# Patient Record
Sex: MALE | Race: BLACK OR AFRICAN AMERICAN | Employment: OTHER | URBAN - METROPOLITAN AREA
[De-identification: names, ages, dates, MRNs, and addresses within clinical notes are randomized per-mention and may not be internally consistent; named-entity substitution may affect disease eponyms.]

---

## 2017-01-09 ENCOUNTER — OFFICE VISIT (OUTPATIENT)
Dept: ORTHOPEDIC SURGERY | Age: 62
End: 2017-01-09

## 2017-01-09 VITALS
HEIGHT: 69 IN | RESPIRATION RATE: 16 BRPM | BODY MASS INDEX: 36.73 KG/M2 | TEMPERATURE: 97.6 F | SYSTOLIC BLOOD PRESSURE: 134 MMHG | WEIGHT: 248 LBS | DIASTOLIC BLOOD PRESSURE: 72 MMHG | HEART RATE: 71 BPM

## 2017-01-09 DIAGNOSIS — M16.12 PRIMARY OSTEOARTHRITIS OF LEFT HIP: ICD-10-CM

## 2017-01-09 DIAGNOSIS — M25.552 PAIN OF LEFT HIP JOINT: Primary | ICD-10-CM

## 2017-01-09 PROCEDURE — G8484 FLU IMMUNIZE NO ADMIN: HCPCS | Performed by: PHYSICIAN ASSISTANT

## 2017-01-09 PROCEDURE — G8427 DOCREV CUR MEDS BY ELIG CLIN: HCPCS | Performed by: PHYSICIAN ASSISTANT

## 2017-01-09 PROCEDURE — G8419 CALC BMI OUT NRM PARAM NOF/U: HCPCS | Performed by: PHYSICIAN ASSISTANT

## 2017-01-09 PROCEDURE — 3017F COLORECTAL CA SCREEN DOC REV: CPT | Performed by: PHYSICIAN ASSISTANT

## 2017-01-09 PROCEDURE — 1036F TOBACCO NON-USER: CPT | Performed by: PHYSICIAN ASSISTANT

## 2017-01-09 PROCEDURE — 73502 X-RAY EXAM HIP UNI 2-3 VIEWS: CPT | Performed by: PHYSICIAN ASSISTANT

## 2017-01-09 PROCEDURE — 99214 OFFICE O/P EST MOD 30 MIN: CPT | Performed by: PHYSICIAN ASSISTANT

## 2017-03-06 ENCOUNTER — TELEPHONE (OUTPATIENT)
Dept: ORTHOPEDIC SURGERY | Age: 62
End: 2017-03-06

## 2017-03-23 ENCOUNTER — OFFICE VISIT (OUTPATIENT)
Dept: VASCULAR SURGERY | Age: 62
End: 2017-03-23

## 2017-03-23 VITALS
DIASTOLIC BLOOD PRESSURE: 80 MMHG | SYSTOLIC BLOOD PRESSURE: 136 MMHG | WEIGHT: 263 LBS | BODY MASS INDEX: 38.95 KG/M2 | HEIGHT: 69 IN

## 2017-03-23 DIAGNOSIS — I87.2 CHRONIC VENOUS INSUFFICIENCY: Primary | ICD-10-CM

## 2017-03-23 PROCEDURE — 1036F TOBACCO NON-USER: CPT | Performed by: SURGERY

## 2017-03-23 PROCEDURE — 99214 OFFICE O/P EST MOD 30 MIN: CPT | Performed by: SURGERY

## 2017-03-23 PROCEDURE — 3017F COLORECTAL CA SCREEN DOC REV: CPT | Performed by: SURGERY

## 2017-03-23 PROCEDURE — G8484 FLU IMMUNIZE NO ADMIN: HCPCS | Performed by: SURGERY

## 2017-03-23 PROCEDURE — G8427 DOCREV CUR MEDS BY ELIG CLIN: HCPCS | Performed by: SURGERY

## 2017-03-23 PROCEDURE — G8419 CALC BMI OUT NRM PARAM NOF/U: HCPCS | Performed by: SURGERY

## 2017-03-28 ENCOUNTER — HOSPITAL ENCOUNTER (OUTPATIENT)
Dept: PREADMISSION TESTING | Age: 62
Discharge: HOME | End: 2017-03-28
Attending: ORTHOPAEDIC SURGERY | Admitting: ORTHOPAEDIC SURGERY

## 2017-03-29 ENCOUNTER — OFFICE VISIT (OUTPATIENT)
Dept: ORTHOPEDIC SURGERY | Age: 62
End: 2017-03-29

## 2017-03-29 ENCOUNTER — HOSPITAL ENCOUNTER (OUTPATIENT)
Dept: PREADMISSION TESTING | Age: 62
Discharge: HOME | End: 2017-03-29
Attending: ORTHOPAEDIC SURGERY | Admitting: ORTHOPAEDIC SURGERY

## 2017-03-29 VITALS
WEIGHT: 248 LBS | TEMPERATURE: 96.9 F | BODY MASS INDEX: 36.73 KG/M2 | HEART RATE: 63 BPM | DIASTOLIC BLOOD PRESSURE: 84 MMHG | SYSTOLIC BLOOD PRESSURE: 154 MMHG | HEIGHT: 69 IN

## 2017-03-29 DIAGNOSIS — M16.12 ARTHRITIS OF LEFT HIP: Primary | ICD-10-CM

## 2017-03-29 LAB
ABO/RH: NORMAL
ANION GAP SERPL CALCULATED.3IONS-SCNC: 12 MMOL/L (ref 3–16)
ANTIBODY SCREEN: NORMAL
APTT: 27.5 SEC (ref 21–31.8)
BASOPHILS ABSOLUTE: 0 K/UL (ref 0–0.2)
BASOPHILS RELATIVE PERCENT: 0.7 %
BILIRUBIN URINE: NEGATIVE
BLOOD, URINE: NEGATIVE
BUN BLDV-MCNC: 14 MG/DL (ref 7–20)
CALCIUM SERPL-MCNC: 9.6 MG/DL (ref 8.3–10.6)
CHLORIDE BLD-SCNC: 100 MMOL/L (ref 99–110)
CLARITY: CLEAR
CO2: 32 MMOL/L (ref 21–32)
COLOR: YELLOW
CREAT SERPL-MCNC: 0.7 MG/DL (ref 0.8–1.3)
EKG ATRIAL RATE: 62 BPM
EKG DIAGNOSIS: NORMAL
EKG P AXIS: 18 DEGREES
EKG P-R INTERVAL: 212 MS
EKG Q-T INTERVAL: 404 MS
EKG QRS DURATION: 88 MS
EKG QTC CALCULATION (BAZETT): 410 MS
EKG R AXIS: 20 DEGREES
EKG T AXIS: 6 DEGREES
EKG VENTRICULAR RATE: 62 BPM
EOSINOPHILS ABSOLUTE: 0.2 K/UL (ref 0–0.6)
EOSINOPHILS RELATIVE PERCENT: 4 %
GFR AFRICAN AMERICAN: >60
GFR NON-AFRICAN AMERICAN: >60
GLUCOSE BLD-MCNC: 124 MG/DL (ref 70–99)
GLUCOSE URINE: NEGATIVE MG/DL
HCT VFR BLD CALC: 35.6 % (ref 40.5–52.5)
HEMOGLOBIN: 11.7 G/DL (ref 13.5–17.5)
INR BLD: 1.1 (ref 0.85–1.15)
KETONES, URINE: NEGATIVE MG/DL
LEUKOCYTE ESTERASE, URINE: NEGATIVE
LYMPHOCYTES ABSOLUTE: 2 K/UL (ref 1–5.1)
LYMPHOCYTES RELATIVE PERCENT: 47.6 %
MCH RBC QN AUTO: 31.6 PG (ref 26–34)
MCHC RBC AUTO-ENTMCNC: 32.7 G/DL (ref 31–36)
MCV RBC AUTO: 96.6 FL (ref 80–100)
MICROSCOPIC EXAMINATION: NORMAL
MONOCYTES ABSOLUTE: 0.4 K/UL (ref 0–1.3)
MONOCYTES RELATIVE PERCENT: 8.6 %
NEUTROPHILS ABSOLUTE: 1.6 K/UL (ref 1.7–7.7)
NEUTROPHILS RELATIVE PERCENT: 39.1 %
NITRITE, URINE: NEGATIVE
PDW BLD-RTO: 13.3 % (ref 12.4–15.4)
PH UA: 6
PLATELET # BLD: 151 K/UL (ref 135–450)
PMV BLD AUTO: 9 FL (ref 5–10.5)
POTASSIUM SERPL-SCNC: 4.4 MMOL/L (ref 3.5–5.1)
PROTEIN UA: NEGATIVE MG/DL
PROTHROMBIN TIME: 12.4 SEC (ref 9.6–13)
RBC # BLD: 3.69 M/UL (ref 4.2–5.9)
SEDIMENTATION RATE, ERYTHROCYTE: 11 MM/HR (ref 0–20)
SODIUM BLD-SCNC: 144 MMOL/L (ref 136–145)
SPECIFIC GRAVITY UA: 1.02
URINE REFLEX TO CULTURE: NORMAL
URINE TYPE: NORMAL
UROBILINOGEN, URINE: 0.2 E.U./DL
WBC # BLD: 4.1 K/UL (ref 4–11)

## 2017-03-29 PROCEDURE — 3017F COLORECTAL CA SCREEN DOC REV: CPT | Performed by: ORTHOPAEDIC SURGERY

## 2017-03-29 PROCEDURE — 1036F TOBACCO NON-USER: CPT | Performed by: ORTHOPAEDIC SURGERY

## 2017-03-29 PROCEDURE — G8417 CALC BMI ABV UP PARAM F/U: HCPCS | Performed by: ORTHOPAEDIC SURGERY

## 2017-03-29 PROCEDURE — 93010 ELECTROCARDIOGRAM REPORT: CPT | Performed by: INTERNAL MEDICINE

## 2017-03-29 PROCEDURE — G8427 DOCREV CUR MEDS BY ELIG CLIN: HCPCS | Performed by: ORTHOPAEDIC SURGERY

## 2017-03-29 PROCEDURE — 99214 OFFICE O/P EST MOD 30 MIN: CPT | Performed by: ORTHOPAEDIC SURGERY

## 2017-03-29 PROCEDURE — G8484 FLU IMMUNIZE NO ADMIN: HCPCS | Performed by: ORTHOPAEDIC SURGERY

## 2017-03-30 ENCOUNTER — PAT TELEPHONE (OUTPATIENT)
Dept: PREADMISSION TESTING | Age: 62
End: 2017-03-30

## 2017-03-30 VITALS — WEIGHT: 246 LBS | BODY MASS INDEX: 36.43 KG/M2 | HEIGHT: 69 IN

## 2017-03-30 LAB
ESTIMATED AVERAGE GLUCOSE: 96.8 MG/DL
HBA1C MFR BLD: 5 %
MRSA SCREEN RT-PCR: NORMAL

## 2017-03-30 RX ORDER — OXYCODONE HYDROCHLORIDE AND ACETAMINOPHEN 5; 325 MG/1; MG/1
1 TABLET ORAL PRN
Status: ON HOLD | COMMUNITY
Start: 2017-03-15 | End: 2017-04-04 | Stop reason: HOSPADM

## 2017-03-30 RX ORDER — AMITRIPTYLINE HYDROCHLORIDE 75 MG/1
75 TABLET, FILM COATED ORAL NIGHTLY PRN
COMMUNITY
Start: 2017-03-15

## 2017-03-30 RX ORDER — NABUMETONE 750 MG/1
750 TABLET, FILM COATED ORAL 2 TIMES DAILY
Status: ON HOLD | COMMUNITY
Start: 2017-03-15 | End: 2017-04-04 | Stop reason: HOSPADM

## 2017-03-30 RX ORDER — OXYCODONE HYDROCHLORIDE 5 MG/1
10 TABLET ORAL ONCE
Status: CANCELLED | OUTPATIENT
Start: 2017-04-04

## 2017-04-04 PROBLEM — M16.12 ARTHRITIS OF LEFT HIP: Status: ACTIVE | Noted: 2017-04-04

## 2017-04-14 ENCOUNTER — OFFICE VISIT (OUTPATIENT)
Dept: ORTHOPEDIC SURGERY | Age: 62
End: 2017-04-14

## 2017-04-14 VITALS — HEIGHT: 69 IN | BODY MASS INDEX: 36.73 KG/M2 | WEIGHT: 248 LBS

## 2017-04-14 DIAGNOSIS — Z96.642 STATUS POST LEFT HIP REPLACEMENT: Primary | ICD-10-CM

## 2017-04-14 PROCEDURE — 73502 X-RAY EXAM HIP UNI 2-3 VIEWS: CPT | Performed by: PHYSICIAN ASSISTANT

## 2017-04-14 PROCEDURE — 99024 POSTOP FOLLOW-UP VISIT: CPT | Performed by: PHYSICIAN ASSISTANT

## 2017-04-14 RX ORDER — OXYCODONE HYDROCHLORIDE AND ACETAMINOPHEN 5; 325 MG/1; MG/1
1-2 TABLET ORAL EVERY 4 HOURS PRN
Qty: 60 TABLET | Refills: 0 | Status: SHIPPED | OUTPATIENT
Start: 2017-04-14 | End: 2017-05-01 | Stop reason: SDUPTHER

## 2017-04-20 ENCOUNTER — HOSPITAL ENCOUNTER (OUTPATIENT)
Dept: PHYSICAL THERAPY | Age: 62
Discharge: HOME | End: 2017-04-30
Attending: ORTHOPAEDIC SURGERY | Admitting: ORTHOPAEDIC SURGERY

## 2017-04-20 ASSESSMENT — PAIN DESCRIPTION - PAIN TYPE: TYPE: SURGICAL PAIN

## 2017-04-20 ASSESSMENT — PAIN DESCRIPTION - DESCRIPTORS: DESCRIPTORS: BURNING;SHARP;SHOOTING

## 2017-04-20 ASSESSMENT — PAIN DESCRIPTION - ONSET: ONSET: GRADUAL

## 2017-04-20 ASSESSMENT — PAIN DESCRIPTION - FREQUENCY: FREQUENCY: INTERMITTENT

## 2017-04-20 ASSESSMENT — PAIN DESCRIPTION - PROGRESSION: CLINICAL_PROGRESSION: GRADUALLY IMPROVING

## 2017-04-20 ASSESSMENT — PAIN SCALES - GENERAL: PAINLEVEL_OUTOF10: 8

## 2017-04-20 ASSESSMENT — PAIN DESCRIPTION - LOCATION: LOCATION: HIP

## 2017-04-24 ENCOUNTER — HOSPITAL ENCOUNTER (OUTPATIENT)
Dept: PHYSICAL THERAPY | Age: 62
Discharge: HOME | End: 2017-04-25
Admitting: ORTHOPAEDIC SURGERY

## 2017-04-26 ENCOUNTER — HOSPITAL ENCOUNTER (OUTPATIENT)
Dept: PHYSICAL THERAPY | Age: 62
Discharge: HOME | End: 2017-04-27
Admitting: ORTHOPAEDIC SURGERY

## 2017-05-01 ENCOUNTER — OFFICE VISIT (OUTPATIENT)
Dept: ORTHOPEDIC SURGERY | Age: 62
End: 2017-05-01

## 2017-05-01 VITALS — TEMPERATURE: 97.7 F

## 2017-05-01 DIAGNOSIS — Z96.642 STATUS POST TOTAL HIP REPLACEMENT, LEFT: Primary | ICD-10-CM

## 2017-05-01 PROCEDURE — 99024 POSTOP FOLLOW-UP VISIT: CPT | Performed by: ORTHOPAEDIC SURGERY

## 2017-05-01 RX ORDER — OXYCODONE HYDROCHLORIDE AND ACETAMINOPHEN 5; 325 MG/1; MG/1
1 TABLET ORAL EVERY 4 HOURS PRN
Qty: 60 TABLET | Refills: 0 | Status: SHIPPED | OUTPATIENT
Start: 2017-05-01 | End: 2017-05-19 | Stop reason: SDUPTHER

## 2017-05-03 ENCOUNTER — HOSPITAL ENCOUNTER (OUTPATIENT)
Dept: PHYSICAL THERAPY | Age: 62
Discharge: HOME | End: 2017-05-04
Admitting: ORTHOPAEDIC SURGERY

## 2017-05-10 ENCOUNTER — HOSPITAL ENCOUNTER (OUTPATIENT)
Dept: PHYSICAL THERAPY | Age: 62
Discharge: HOME | End: 2017-05-11
Admitting: ORTHOPAEDIC SURGERY

## 2017-05-17 ENCOUNTER — HOSPITAL ENCOUNTER (OUTPATIENT)
Dept: PHYSICAL THERAPY | Age: 62
Discharge: HOME | End: 2017-05-18
Admitting: ORTHOPAEDIC SURGERY

## 2017-05-19 ENCOUNTER — HOSPITAL ENCOUNTER (OUTPATIENT)
Dept: PHYSICAL THERAPY | Age: 62
Discharge: HOME | End: 2017-05-20
Admitting: ORTHOPAEDIC SURGERY

## 2017-05-19 RX ORDER — OXYCODONE HYDROCHLORIDE AND ACETAMINOPHEN 5; 325 MG/1; MG/1
1 TABLET ORAL EVERY 6 HOURS PRN
Qty: 60 TABLET | Refills: 0 | Status: SHIPPED | OUTPATIENT
Start: 2017-05-19 | End: 2017-06-18

## 2017-05-22 ENCOUNTER — TELEPHONE (OUTPATIENT)
Dept: ORTHOPEDIC SURGERY | Age: 62
End: 2017-05-22

## 2017-05-22 ENCOUNTER — HOSPITAL ENCOUNTER (OUTPATIENT)
Dept: PHYSICAL THERAPY | Age: 62
Discharge: HOME | End: 2017-05-23
Admitting: ORTHOPAEDIC SURGERY

## 2017-05-24 ENCOUNTER — HOSPITAL ENCOUNTER (OUTPATIENT)
Dept: PHYSICAL THERAPY | Age: 62
Discharge: HOME | End: 2017-05-25
Admitting: ORTHOPAEDIC SURGERY

## 2017-05-25 ENCOUNTER — OFFICE VISIT (OUTPATIENT)
Dept: ORTHOPEDIC SURGERY | Age: 62
End: 2017-05-25

## 2017-05-25 VITALS — WEIGHT: 248 LBS | RESPIRATION RATE: 16 BRPM | TEMPERATURE: 98.1 F | BODY MASS INDEX: 36.73 KG/M2 | HEIGHT: 69 IN

## 2017-05-25 DIAGNOSIS — Z96.642 H/O TOTAL HIP ARTHROPLASTY, LEFT: Primary | ICD-10-CM

## 2017-05-25 PROCEDURE — 99024 POSTOP FOLLOW-UP VISIT: CPT | Performed by: PHYSICIAN ASSISTANT

## 2017-05-25 RX ORDER — OXYBUTYNIN CHLORIDE 10 MG/1
TABLET, EXTENDED RELEASE ORAL
COMMUNITY
Start: 2017-05-11 | End: 2022-01-18

## 2017-05-25 RX ORDER — NABUMETONE 750 MG/1
750 TABLET, FILM COATED ORAL 2 TIMES DAILY
Status: ON HOLD | COMMUNITY
Start: 2017-05-18 | End: 2019-07-26 | Stop reason: HOSPADM

## 2017-05-25 RX ORDER — GABAPENTIN 800 MG/1
800 TABLET ORAL 3 TIMES DAILY
COMMUNITY
Start: 2017-05-20 | End: 2021-12-23

## 2017-06-02 ENCOUNTER — HOSPITAL ENCOUNTER (OUTPATIENT)
Dept: PHYSICAL THERAPY | Age: 62
Discharge: HOME | End: 2017-06-03
Admitting: ORTHOPAEDIC SURGERY

## 2017-06-06 ENCOUNTER — HOSPITAL ENCOUNTER (OUTPATIENT)
Dept: PHYSICAL THERAPY | Age: 62
Discharge: HOME | End: 2017-06-07
Admitting: ORTHOPAEDIC SURGERY

## 2017-06-08 ENCOUNTER — HOSPITAL ENCOUNTER (OUTPATIENT)
Dept: PHYSICAL THERAPY | Age: 62
Discharge: HOME | End: 2017-06-09
Admitting: ORTHOPAEDIC SURGERY

## 2017-06-13 ENCOUNTER — HOSPITAL ENCOUNTER (OUTPATIENT)
Dept: PHYSICAL THERAPY | Age: 62
Discharge: HOME | End: 2017-06-14
Admitting: ORTHOPAEDIC SURGERY

## 2017-06-15 ENCOUNTER — HOSPITAL ENCOUNTER (OUTPATIENT)
Dept: PHYSICAL THERAPY | Age: 62
Discharge: HOME | End: 2017-06-16
Admitting: ORTHOPAEDIC SURGERY

## 2017-06-20 ENCOUNTER — HOSPITAL ENCOUNTER (OUTPATIENT)
Dept: PHYSICAL THERAPY | Age: 62
Discharge: HOME | End: 2017-06-21
Admitting: ORTHOPAEDIC SURGERY

## 2017-06-29 ENCOUNTER — HOSPITAL ENCOUNTER (OUTPATIENT)
Dept: PHYSICAL THERAPY | Age: 62
Discharge: HOME | End: 2017-06-30
Admitting: ORTHOPAEDIC SURGERY

## 2017-07-03 ENCOUNTER — OFFICE VISIT (OUTPATIENT)
Dept: ORTHOPEDIC SURGERY | Age: 62
End: 2017-07-03

## 2017-07-03 VITALS — TEMPERATURE: 98.7 F | BODY MASS INDEX: 36.29 KG/M2 | HEIGHT: 69 IN | WEIGHT: 245 LBS

## 2017-07-03 DIAGNOSIS — M16.12 PRIMARY OSTEOARTHRITIS OF LEFT HIP: Primary | ICD-10-CM

## 2017-07-03 PROCEDURE — 99024 POSTOP FOLLOW-UP VISIT: CPT | Performed by: ORTHOPAEDIC SURGERY

## 2017-07-03 RX ORDER — METHYLPREDNISOLONE 4 MG/1
TABLET ORAL
Qty: 1 KIT | Refills: 0 | Status: SHIPPED | OUTPATIENT
Start: 2017-07-03 | End: 2017-07-09

## 2017-07-24 ENCOUNTER — OFFICE VISIT (OUTPATIENT)
Dept: ORTHOPEDIC SURGERY | Age: 62
End: 2017-07-24

## 2017-07-24 VITALS
HEART RATE: 73 BPM | HEIGHT: 69 IN | DIASTOLIC BLOOD PRESSURE: 87 MMHG | TEMPERATURE: 97.2 F | SYSTOLIC BLOOD PRESSURE: 149 MMHG | BODY MASS INDEX: 36.29 KG/M2 | WEIGHT: 245 LBS

## 2017-07-24 DIAGNOSIS — Z96.642 STATUS POST TOTAL HIP REPLACEMENT, LEFT: Primary | ICD-10-CM

## 2017-07-24 PROCEDURE — G8427 DOCREV CUR MEDS BY ELIG CLIN: HCPCS | Performed by: PHYSICIAN ASSISTANT

## 2017-07-24 PROCEDURE — 1036F TOBACCO NON-USER: CPT | Performed by: PHYSICIAN ASSISTANT

## 2017-07-24 PROCEDURE — G8417 CALC BMI ABV UP PARAM F/U: HCPCS | Performed by: PHYSICIAN ASSISTANT

## 2017-07-24 PROCEDURE — 99212 OFFICE O/P EST SF 10 MIN: CPT | Performed by: PHYSICIAN ASSISTANT

## 2017-07-24 PROCEDURE — 3017F COLORECTAL CA SCREEN DOC REV: CPT | Performed by: PHYSICIAN ASSISTANT

## 2017-07-25 PROBLEM — Z96.642 STATUS POST TOTAL HIP REPLACEMENT, LEFT: Status: ACTIVE | Noted: 2017-07-25

## 2017-10-30 ENCOUNTER — OFFICE VISIT (OUTPATIENT)
Dept: ORTHOPEDIC SURGERY | Age: 62
End: 2017-10-30

## 2017-10-30 VITALS
WEIGHT: 245 LBS | DIASTOLIC BLOOD PRESSURE: 74 MMHG | HEART RATE: 85 BPM | HEIGHT: 69 IN | BODY MASS INDEX: 36.29 KG/M2 | TEMPERATURE: 97.9 F | SYSTOLIC BLOOD PRESSURE: 134 MMHG

## 2017-10-30 DIAGNOSIS — Z96.642 STATUS POST TOTAL HIP REPLACEMENT, LEFT: Primary | ICD-10-CM

## 2017-10-30 PROCEDURE — 1036F TOBACCO NON-USER: CPT | Performed by: PHYSICIAN ASSISTANT

## 2017-10-30 PROCEDURE — 3017F COLORECTAL CA SCREEN DOC REV: CPT | Performed by: PHYSICIAN ASSISTANT

## 2017-10-30 PROCEDURE — G8427 DOCREV CUR MEDS BY ELIG CLIN: HCPCS | Performed by: PHYSICIAN ASSISTANT

## 2017-10-30 PROCEDURE — G8417 CALC BMI ABV UP PARAM F/U: HCPCS | Performed by: PHYSICIAN ASSISTANT

## 2017-10-30 PROCEDURE — 99213 OFFICE O/P EST LOW 20 MIN: CPT | Performed by: PHYSICIAN ASSISTANT

## 2017-10-30 PROCEDURE — G8484 FLU IMMUNIZE NO ADMIN: HCPCS | Performed by: PHYSICIAN ASSISTANT

## 2017-10-30 NOTE — PROGRESS NOTES
Subjective:      Patient ID: Justus Lantigua is a 57 y.o. male. Chief Complaint   Patient presents with    Hip Pain     Left TALIB 4.4.2017        HPI:  Here for follow up on left hip arthroplasty. The date of procedure- 4/4/2017. Denies fever or chills. He has noticed some increased swelling in the left buttock region over the past several days. No history of injury. Denies any other complaints. Review of Systems:   Negative for fever or chills. Negative for instability. Past Medical History:   Diagnosis Date    Arthritis     Diabetes mellitus ([address]     High blood pressure     Hyperlipidemia     Wears partial dentures        Family History   Problem Relation Age of Onset    Heart Disease Mother     High Blood Pressure Mother     Diabetes Mother     Heart Disease Father     High Blood Pressure Father     Diabetes Father        Past Surgical History:   Procedure Laterality Date   Tonya Tameka SURGERY  10/2007   1979  1980    CARPAL TUNNEL RELEASE Bilateral     HIP ARTHROPLASTY Left 04/04/2017    HIP SURGERY  04/2008    bursa removed    JOINT REPLACEMENT  2009, 2006, 2003, 1997    Left knee replacement    KNEE SURGERY Left     [id number] scopes       Social History     Occupational History    Not on file.      Social History Main Topics    Smoking status: Never Smoker    Smokeless tobacco: Not on file    Alcohol use No    Drug use: No    Sexual activity: Not on file       Current Outpatient Prescriptions   Medication Sig Dispense Refill    gabapentin (NEURONTIN) 800 MG tablet       nabumetone (RELAFEN) 750 MG tablet       oxybutynin (DITROPAN-XL) 10 MG extended release tablet       rivaroxaban (XARELTO) 10 MG TABS tablet Take 1 tablet by mouth daily Begin day after discharge from hospital and continue for 30 total days 30 tablet 0    amitriptyline (ELAVIL) 75 MG tablet Take 75 mg by mouth nightly as needed       augmented betamethasone dipropionate (DIPROLENE-AF) 0.05 % cream Apply thin layer topically to L ankle every third day 45 g 1    gabapentin (NEURONTIN) 600 MG tablet Take 600 mg by mouth 3 times daily.  metformin (GLUCOPHAGE) 500 MG tablet Take 500 mg by mouth 2 times daily (with meals) Takes only as needed      metoprolol (TOPROL-XL) 25 MG XL tablet Take 25 mg by mouth daily       zolpidem (AMBIEN) 5 MG tablet Take 5 mg by mouth nightly as needed       baclofen (LIORESAL) 10 MG tablet Take 10 mg by mouth 3 times daily.  methadone (DOLOPHINE) 5 MG tablet Take 5 mg by mouth 3 times daily  .  PRESTIGE SMART SYSTEM TEST STP strip       Multiple Vitamins-Minerals (CENTRUM SILVER PO) Take  by mouth.  lisinopril-hydrochlorothiazide (PRINZIDE;ZESTORETIC) 20-25 MG per tablet Take 1 Tab by mouth daily.  atorvastatin (LIPITOR) 20 MG tablet Take 20 mg by mouth nightly        No current facility-administered medications for this visit. Objective:   He  is alert, oriented x 3, pleasant, well nourished, developed and in no acute distress. /74   Pulse 85   Temp 97.9 °F (36.6 °C) (Temporal)   Ht 5' 9\" (1.753 m)   Wt 245 lb (111.1 kg)   BMI 36.18 kg/m²        Examination of the left hip shows: The incision is healed. There is no erythema. There is no pain with internal and external rotation. There is mild pain with flexion and extension. There is mild pain with active SLR. Leg lengths: equal  There is no pain with weight bearing. He does have some fullness in the gluteus region on the left compared to the right. There is no fluctuance or increased warmth. Examination of the lower extremities are intact with sensation to light touch. Motor testing  5/5 in all major motor groups of the lower extremities. Gait is normal heel to toe. Gait is not antalgic. Negative Waggoner's Sign. SLR negative. Examination of the lower extremities shows intact perfusion to all extremities.   No

## 2018-05-16 ENCOUNTER — HOSPITAL ENCOUNTER (OUTPATIENT)
Dept: OTHER | Age: 63
Discharge: HOME | End: 2018-05-31
Attending: ORTHOPAEDIC SURGERY | Admitting: ORTHOPAEDIC SURGERY

## 2018-05-16 ASSESSMENT — PAIN DESCRIPTION - ORIENTATION: ORIENTATION: RIGHT

## 2018-05-16 ASSESSMENT — PAIN DESCRIPTION - ONSET: ONSET: ON-GOING

## 2018-05-16 ASSESSMENT — PAIN DESCRIPTION - PAIN TYPE: TYPE: SURGICAL PAIN

## 2018-05-16 ASSESSMENT — PAIN DESCRIPTION - PROGRESSION: CLINICAL_PROGRESSION: NOT CHANGED

## 2018-05-16 ASSESSMENT — PAIN SCALES - GENERAL: PAINLEVEL_OUTOF10: 8

## 2018-05-16 ASSESSMENT — PAIN DESCRIPTION - FREQUENCY: FREQUENCY: CONTINUOUS

## 2018-05-16 ASSESSMENT — PAIN DESCRIPTION - LOCATION: LOCATION: KNEE

## 2018-05-16 ASSESSMENT — ACTIVITIES OF DAILY LIVING (ADL): EFFECT OF PAIN ON DAILY ACTIVITIES: LIMITS WB ACTIVITIES

## 2018-05-18 ENCOUNTER — HOSPITAL ENCOUNTER (OUTPATIENT)
Dept: PHYSICAL THERAPY | Age: 63
Discharge: HOME | End: 2018-05-19
Admitting: ORTHOPAEDIC SURGERY

## 2018-05-18 NOTE — FLOWSHEET NOTE
Physical Therapy Daily Treatment Note  Date:  2018    Patient Name:  Carmelo Keating    :  1955  MRN: [id number]  Restrictions/Precautions:  TALIB's, Left TKA  Pertinent Medical History:  Medical/Treatment Diagnosis Information:  · Diagnosis: Right knee oa  · Treatment Diagnosis: decreased abilty to ambulate and function  Insurance/Certification information:  PT Insurance Information: medicare  Physician Information:  Referring Practitioner: Dr. Rocky Mcbride of care signed (Y/N):  routed  Visit# / total visits:   Pain level: 4-8/10     G-Code (if applicable):   CM,  18    Date / Visit # G-Code Applied:  /  PT G-Codes  Functional Assessment Tool Used: lefs  Score: 4  Functional Limitation: Mobility: Walking and moving around  Mobility: Walking and Moving Around Current Status (): At least 80 percent but less than 100 percent impaired, limited or restricted  Mobility: Walking and Moving Around Goal Status (): At least 60 percent but less than 80 percent impaired, limited or restricted    Progress Note: []  Yes  []  No  Next due by: Visit #10      History of Injury: Pt is a 56 y/o male with a hx of right knee pain for a few years with a resultant right TKA on 18. He had a nerve block with his surgery  and the pain was ok until today. He did not have PT. He now c/o constant  pain in his right knee which is severe with wb activties, at night,  He wakes from pain and can't get comfortable. He is walking with  a rolling walker at this time. He hopes to return to all activities and normal walking    Subjective:   Pt states, \" Doing ok so far \"  18 Patient reports knee is stiff and sore today.     Objective:  Initial- flex- 60, ext- -10,  Strength- 4-/5    Exercises:  Exercise/Equipment Resistance/Repetitions Other comments   Nu step 6 min    Leg press 60 # x 30     Heel slides 30 x     Hams stretch     saq/laq 0 # x 30     wabble     Step ups     Side step     Standing

## 2018-05-22 ENCOUNTER — HOSPITAL ENCOUNTER (OUTPATIENT)
Dept: PHYSICAL THERAPY | Age: 63
Discharge: HOME | End: 2018-05-23
Admitting: ORTHOPAEDIC SURGERY

## 2018-05-22 NOTE — FLOWSHEET NOTE
Hams stretch     saq/laq 0 # x 30     wabble     Step ups     Side step     Standing tke     incline 3 x 30 sec          HEP     Heel slides X 20    Qs,laq X 10     slr X 10    Seated hams/gastroc stretch 30 x 3    Seated flex stretch 30 x 3    Weight shift X 2 min           Other Therapeutic Activities:      Home Exercise Program:  Patient demonstrated proper technique, good tolerance,  and was given written instructions for the above exercises      Manual Treatments:  Prom flex and ext x 10 min    Modalities: cp x 10     Timed Code Treatment Minutes: 40      Total Treatment Minutes:  50    Treatment/Activity Tolerance:  [x] Patient tolerated treatment well [] Patient limited by fatigue  [] Patient limited by pain  [] Patient limited by other medical complications  [] Other:     Prognosis: [x] Good [] Fair  [] Poor    Patient Requires Follow-up: [x] Yes  [] No    Plan:   [] Continue per plan of care [] Alter current plan (see comments)  medicare  [x] Plan of care initiated [] Hold pending MD visit [] Discharge  Plan for Next Session: right le rom , strength, gait, hep, functional activities, mfr, rom, mods for pain, advanced gait activities, proprio, 5/22/18  PT had a little redness in lower leg, it was not warm to otuch.   Told him to keep an eye on it and call the md if it changes     Electronically signed by:  Courtney Maher, PT

## 2018-05-30 ENCOUNTER — HOSPITAL ENCOUNTER (OUTPATIENT)
Dept: PHYSICAL THERAPY | Age: 63
Discharge: HOME | End: 2018-06-30
Admitting: ORTHOPAEDIC SURGERY

## 2018-05-30 NOTE — FLOWSHEET NOTE
Physical Therapy Daily Treatment Note  Date:  2018    Patient Name:  Estrellita Phalen    :  1955  MRN: [id number]  Restrictions/Precautions:  TALIB's, Left TKA  Pertinent Medical History:  Medical/Treatment Diagnosis Information:  · Diagnosis: Right knee oa  · Treatment Diagnosis: decreased abilty to ambulate and function  Insurance/Certification information:  PT Insurance Information: medicare  Physician Information:  Referring Practitioner: Dr. Faby Brandon of care signed (Y/N):  routed  Visit# / total visits:   Pain level: 4-8/10     G-Code (if applicable):   CM,  18    Date / Visit # G-Code Applied:  /  PT G-Codes  Functional Assessment Tool Used: lefs  Score: 4  Functional Limitation: Mobility: Walking and moving around  Mobility: Walking and Moving Around Current Status (): At least 80 percent but less than 100 percent impaired, limited or restricted  Mobility: Walking and Moving Around Goal Status (): At least 60 percent but less than 80 percent impaired, limited or restricted    Progress Note: []  Yes  []  No  Next due by: Visit #10      History of Injury: Pt is a 58 y/o male with a hx of right knee pain for a few years with a resultant right TKA on 18. He had a nerve block with his surgery  and the pain was ok until today. He did not have PT. He now c/o constant  pain in his right knee which is severe with wb activties, at night,  He wakes from pain and can't get comfortable. He is walking with  a rolling walker at this time. He hopes to return to all activities and normal walking    Subjective:   Pt states, \" Doing ok so far \"  18 Patient reports knee is stiff and sore today. 18  Pt states, \" Not sure what I did, but it is way more sore and swollen \"  18 Patient reports knee has been swelling a lot. States he saw MD due to his ankle having a bruise.     Objective:  Initial- flex- 60, ext- -10,  Strength- 4-/5    Exercises:  Exercise/Equipment Resistance/Repetitions Other comments   Nu step 6 min    Leg press 60 # x 30     Heel slides 30 x     Hams stretch 3 x 30 sec     saq/laq 0 # x 30     wabble     Step ups     Side step     Standing tke     incline 3 x 30 sec          HEP     Heel slides X 20    Qs,laq X 10     slr X 10    Seated hams/gastroc stretch 30 x 3    Seated flex stretch 30 x 3    Weight shift X 2 min           Other Therapeutic Activities:      Home Exercise Program:  Patient demonstrated proper technique, good tolerance,  and was given written instructions for the above exercises      Manual Treatments:  Prom flex and ext x 10 min    Modalities:IFC  with cp x 15    Timed Code Treatment Minutes: 40      Total Treatment Minutes:  50    Treatment/Activity Tolerance:  [x] Patient tolerated treatment well [] Patient limited by fatigue  [] Patient limited by pain  [] Patient limited by other medical complications  [] Other:     Prognosis: [x] Good [] Fair  [] Poor    Patient Requires Follow-up: [x] Yes  [] No    Plan:   [] Continue per plan of care [] Alter current plan (see comments)  medicare  [x] Plan of care initiated [] Hold pending MD visit [] Discharge  Plan for Next Session: right le rom , strength, gait, hep, functional activities, mfr, rom, mods for pain, advanced gait activities, proprio, 5/22/18  PT had a little redness in lower leg, it was not warm to otuch.   Told him to keep an eye on it and call the md if it changes     Electronically signed by:  Bea Doyle PTA

## 2018-05-31 ENCOUNTER — HOSPITAL ENCOUNTER (OUTPATIENT)
Dept: PHYSICAL THERAPY | Age: 63
Discharge: HOME | End: 2018-06-01
Admitting: ORTHOPAEDIC SURGERY

## 2018-06-01 ENCOUNTER — HOSPITAL ENCOUNTER (OUTPATIENT)
Dept: OTHER | Age: 63
Discharge: HOME | End: 2018-06-01
Attending: ORTHOPAEDIC SURGERY | Admitting: ORTHOPAEDIC SURGERY

## 2018-06-05 ENCOUNTER — HOSPITAL ENCOUNTER (OUTPATIENT)
Dept: PHYSICAL THERAPY | Age: 63
Discharge: HOME | End: 2018-06-06
Admitting: ORTHOPAEDIC SURGERY

## 2018-06-05 NOTE — FLOWSHEET NOTE
doing a little better. Objective:  Initial- flex- 60, ext- -10,  Strength- 4-/5    Exercises:  Exercise/Equipment Resistance/Repetitions Other comments   Nu step 6 min    Leg press 60 # x 30 , 30# x 30 right    Heel slides 30 x     Hams stretch 3 x 30 sec     Seated curls     saq/laq 2 # x 30     wabble X 2 min ea    Step ups     Side step     Standing tke X 20    incline 3 x 30 sec          HEP     Heel slides X 20    Qs,laq X 10     slr X 10    Seated hams/gastroc stretch 30 x 3    Seated flex stretch 30 x 3    Weight shift X 2 min           Other Therapeutic Activities:      Home Exercise Program:  Patient demonstrated proper technique, good tolerance,  and was given written instructions for the above exercises      Manual Treatments:  Prom flex and ext x 10 min    Modalities:IFC  with cp x 15    Timed Code Treatment Minutes: 42      Total Treatment Minutes:  60    Treatment/Activity Tolerance:  [x] Patient tolerated treatment well [] Patient limited by fatigue  [] Patient limited by pain  [] Patient limited by other medical complications  [] Other:     Prognosis: [x] Good [] Fair  [] Poor    Patient Requires Follow-up: [x] Yes  [] No    Plan:   [] Continue per plan of care [] Alter current plan (see comments)  medicare  [x] Plan of care initiated [] Hold pending MD visit [] Discharge  Plan for Next Session: right le rom , strength, gait, hep, functional activities, mfr, rom, mods for pain, advanced gait activities, proprio, 5/22/18  PT had a little redness in lower leg, it was not warm to otuch.   Told him to keep an eye on it and call the md if it changes     Electronically signed by:  Rosalba Bush PTA

## 2018-06-07 ENCOUNTER — HOSPITAL ENCOUNTER (OUTPATIENT)
Dept: PHYSICAL THERAPY | Age: 63
Discharge: HOME | End: 2018-06-08
Admitting: ORTHOPAEDIC SURGERY

## 2018-06-12 ENCOUNTER — HOSPITAL ENCOUNTER (OUTPATIENT)
Dept: PHYSICAL THERAPY | Age: 63
Discharge: HOME | End: 2018-06-13
Admitting: ORTHOPAEDIC SURGERY

## 2018-06-14 ENCOUNTER — HOSPITAL ENCOUNTER (OUTPATIENT)
Dept: PHYSICAL THERAPY | Age: 63
Discharge: HOME | End: 2018-06-15
Admitting: ORTHOPAEDIC SURGERY

## 2018-06-14 NOTE — FLOWSHEET NOTE
Physical Therapy Daily Treatment Note  Date:  2018    Patient Name:  Tangela Andrade    :  1955  MRN: [id number]  Restrictions/Precautions:  TALIB's, Left TKA  Pertinent Medical History:  Medical/Treatment Diagnosis Information:  · Diagnosis: Right knee oa  · Treatment Diagnosis: decreased abilty to ambulate and function  Insurance/Certification information:  PT Insurance Information: medicare  Physician Information:  Referring Practitioner: Dr. Mitch Kaplan of care signed (Y/N):  routed  Visit# / total visits:   Pain level: 3/10     G-Code (if applicable):   CM,  18    Date / Visit # G-Code Applied:  /  PT G-Codes  Functional Assessment Tool Used: lefs  Score: 4  Functional Limitation: Mobility: Walking and moving around  Mobility: Walking and Moving Around Current Status (): At least 80 percent but less than 100 percent impaired, limited or restricted  Mobility: Walking and Moving Around Goal Status (): At least 60 percent but less than 80 percent impaired, limited or restricted    Progress Note: []  Yes  []  No  Next due by: Visit #10      History of Injury: Pt is a 58 y/o male with a hx of right knee pain for a few years with a resultant right TKA on 18. He had a nerve block with his surgery  and the pain was ok until today. He did not have PT. He now c/o constant  pain in his right knee which is severe with wb activties, at night,  He wakes from pain and can't get comfortable. He is walking with  a rolling walker at this time. He hopes to return to all activities and normal walking    Subjective:   Pt states, \" Doing ok so far \"  18 Patient reports knee is stiff and sore today. 18  Pt states, \" Not sure what I did, but it is way more sore and swollen \"  18 Patient reports knee has been swelling a lot. States he saw MD due to his ankle having a bruise. 18  Pt 25 min late  \" Doing ok \"  18 15 min late.  Patient reports knee has been doing a little better. 06/07/18 Patient reports good and bad days with a knee. 6/12/18: \"Better today. I left the walker in the car. \"  6/14/18 Pt reports feeling more sore today possibly due to being on feet a lot more yesterday. Objective:  6/12/18: Arrived in dept using [address] instead of walker today    Initial- flex- 60, ext- -10,  Strength- 4-/5  6/12/18: ROM of right knee: 0-111     Exercises:  Exercise/Equipment Resistance/Repetitions Other comments   Nu step 6 min    Leg press 75 # x 30 , 60# x 30 right    QSS 30\" x 3    Heel slides 30 x     Hams stretch 3 x 30 sec     Seated curls 4k x 30    saq/laq 3 # x 30     wabble X 2 min ea    Step ups fwd/ lat 6 \"  X 20         Side step     Standing tke Vs yellow band X 20    incline 3 x 30 sec          HEP     Heel slides X 20    Qs,laq X 10     slr X 10    Seated hams/gastroc stretch 30 x 3    Seated flex stretch 30 x 3    Weight shift X 2 min           Other Therapeutic Activities:      Home Exercise Program:  Patient demonstrated proper technique, good tolerance,  and was given written instructions for the above exercises      Manual Treatments:  Prom flex and ext x 15 min    ModalitiesIFC  with cp x 15     Timed Code Treatment Minutes:  75 (6/14/18 more time available today)    Total Treatment Minutes:  90    Treatment/Activity Tolerance:  [x] Patient tolerated treatment well [] Patient limited by fatigue  [] Patient limited by pain  [] Patient limited by other medical complications  [] Other:     Prognosis: [x] Good [] Fair  [] Poor    Patient Requires Follow-up: [x] Yes  [] No    Plan:   [] Continue per plan of care [] Alter current plan (see comments)  medicare  [x] Plan of care initiated [] Hold pending MD visit [] Discharge  Plan for Next Session: right le rom , strength, gait, hep, functional activities, mfr, rom, mods for pain, advanced gait activities, proprio, 5/22/18  PT had a little redness in lower leg, it was not warm to otuch.   Told him to keep

## 2018-06-21 ENCOUNTER — HOSPITAL ENCOUNTER (OUTPATIENT)
Dept: PHYSICAL THERAPY | Age: 63
Discharge: HOME | End: 2018-06-22
Admitting: ORTHOPAEDIC SURGERY

## 2018-06-21 NOTE — PROGRESS NOTES
Extremity Functional Scale (LEFS) is an easily administered and scored functional outcome tool. It can be utilized for lower extremity conditions and is sensitive enough for a wide range of functional disability levels. It can and should be used on the initial visit and subsequently on a 2- 3 week basis to measure patient's progress. The tool has a sufficient measure of reliability, variability, and sensitivity to change for determining minimally clinically important score differences, on a test to re-test basis. Scoring:   LEFS Score = (Sum of Responses / 80) X 100    Error + / - 5 points, Minimum Level of Detectable Change (90% Confidence): 9 Points     BRIANA Dye, KELLEN Vidal, MONSERRAT Mcc[employer] [address] [place] [address], The Lower Extremity Functional Scale: Scale development, measurement properties, and clinical application, Physical Therapy, 1999, 79, [id number], with permission of the [employer].       G-Code Crosswalk:  LEFS Total Score Disability Index CMS Modifier   80 0% []CH   79-65 1-19% []CI   64-49 20-39% []CJ   48-33 40-59% []CK   32-17 60-79% [x]CL   16-1 80-99% []CM   0 100% []CN

## 2018-06-21 NOTE — FLOWSHEET NOTE
doing a little better. 06/07/18 Patient reports good and bad days with a knee. 6/12/18: \"Better today. I left the walker in the car. \"  6/14/18 Pt reports feeling more sore today possibly due to being on feet a lot more yesterday.   6/19/18  Pt states, \"  A little more stiff rom walking too much \"  6/21/18  Pt states, \" doing ok, getting a little electric shock feeling on occasion \"    Objective:  6/12/18: Arrived in dept using [facility] instead of walker today    Initial- flex- 60, ext- -10,  Strength- 4-/5  6/12/18: ROM of right knee: 0-111     Exercises:  Exercise/Equipment Resistance/Repetitions Other comments   Nu step 6 min    Leg press 80 # x 30 , 60# x 30 right    QSS 30\" x 3         Prone flexH30 x 3, curls 4#x 30eel slides 30 x     Hams stretch 3 x 30 sec     Seated curls 4k x 30    saq/laq 3 # x 30     wabble X 2 min ea    Step ups fwd/ lat 6 \"  X 20    sls     Side step     Standing tke Vs yellow band X 20    incline 3 x 30 sec          HEP     Heel slides X 20    Qs,laq X 10     slr X 10    Seated hams/gastroc stretch 30 x 3    Seated flex stretch 30 x 3    Weight shift X 2 min           Other Therapeutic Activities:      Home Exercise Program:  Patient demonstrated proper technique, good tolerance,  and was given written instructions for the above exercises      Manual Treatments:  Prom flex and ext x 15 min    Modalities cp x 15     Timed Code Treatment Minutes:  50    Total Treatment Minutes:  65    Treatment/Activity Tolerance:  [x] Patient tolerated treatment well [] Patient limited by fatigue  [] Patient limited by pain  [] Patient limited by other medical complications  [] Other:     Prognosis: [x] Good [] Fair  [] Poor    Patient Requires Follow-up: [x] Yes  [] No    Plan:   [] Continue per plan of care [] Alter current plan (see comments)  medicare  [x] Plan of care initiated [] Hold pending MD visit [] Discharge  Plan for Next Session: right le rom , strength, gait, hep, functional activities, mfr, rom, mods for pain, advanced gait activities, proprio, 5/22/18  PT had a little redness in lower leg, it was not warm to otuch.   Told him to keep an eye on it and call the md if it changes     Electronically signed by:  Mandy Brown, PT

## 2018-07-01 ENCOUNTER — HOSPITAL ENCOUNTER (OUTPATIENT)
Dept: OTHER | Age: 63
Discharge: HOME | End: 2018-07-31
Attending: ORTHOPAEDIC SURGERY | Admitting: ORTHOPAEDIC SURGERY

## 2018-09-25 ENCOUNTER — HOSPITAL ENCOUNTER (OUTPATIENT)
Dept: GENERAL RADIOLOGY | Age: 63
Discharge: HOME | End: 2018-09-25
Payer: COMMERCIAL

## 2018-09-25 ENCOUNTER — HOSPITAL ENCOUNTER (OUTPATIENT)
Age: 63
Discharge: HOME | End: 2018-09-25
Payer: COMMERCIAL

## 2018-09-25 ENCOUNTER — OFFICE VISIT (OUTPATIENT)
Dept: ORTHOPEDIC SURGERY | Age: 63
End: 2018-09-25
Payer: COMMERCIAL

## 2018-09-25 VITALS
HEART RATE: 58 BPM | WEIGHT: 251 LBS | HEIGHT: 69 IN | BODY MASS INDEX: 37.18 KG/M2 | DIASTOLIC BLOOD PRESSURE: 66 MMHG | SYSTOLIC BLOOD PRESSURE: 110 MMHG | TEMPERATURE: 97.8 F

## 2018-09-25 DIAGNOSIS — Z96.642 H/O TOTAL HIP ARTHROPLASTY, LEFT: Primary | ICD-10-CM

## 2018-09-25 DIAGNOSIS — M54.16 LUMBAR RADICULOPATHY: ICD-10-CM

## 2018-09-25 DIAGNOSIS — M70.62 TROCHANTERIC BURSITIS OF LEFT HIP: ICD-10-CM

## 2018-09-25 DIAGNOSIS — M25.522 ARTHRALGIA OF ELBOW, LEFT: ICD-10-CM

## 2018-09-25 PROCEDURE — 20610 DRAIN/INJ JOINT/BURSA W/O US: CPT | Performed by: PHYSICIAN ASSISTANT

## 2018-09-25 PROCEDURE — 4040F PNEUMOC VAC/ADMIN/RCVD: CPT | Performed by: PHYSICIAN ASSISTANT

## 2018-09-25 PROCEDURE — G8427 DOCREV CUR MEDS BY ELIG CLIN: HCPCS | Performed by: PHYSICIAN ASSISTANT

## 2018-09-25 PROCEDURE — 73080 X-RAY EXAM OF ELBOW: CPT

## 2018-09-25 PROCEDURE — 1036F TOBACCO NON-USER: CPT | Performed by: PHYSICIAN ASSISTANT

## 2018-09-25 PROCEDURE — 3017F COLORECTAL CA SCREEN DOC REV: CPT | Performed by: PHYSICIAN ASSISTANT

## 2018-09-25 PROCEDURE — G8417 CALC BMI ABV UP PARAM F/U: HCPCS | Performed by: PHYSICIAN ASSISTANT

## 2018-09-25 PROCEDURE — 99213 OFFICE O/P EST LOW 20 MIN: CPT | Performed by: PHYSICIAN ASSISTANT

## 2018-09-25 PROCEDURE — 1101F PT FALLS ASSESS-DOCD LE1/YR: CPT | Performed by: PHYSICIAN ASSISTANT

## 2018-09-25 PROCEDURE — 1123F ACP DISCUSS/DSCN MKR DOCD: CPT | Performed by: PHYSICIAN ASSISTANT

## 2018-09-25 NOTE — PROGRESS NOTES
This dictation was done with [employer] dictation and may contain mechanical errors related to translation. The review of systems was currently provided by the patient and reviewed with the medical assistant at today's visit. Please see media.     Subjective:  Álvaro Stark is a 70 y.o. who is here complaining of pain in his back and left hip radiating both lateral and into the groin area he has a history of a multiple back surgeries by Dr. Ciaran Quiroz and a left hip replacement by Dr. Chey Olivares he actually had been doing fairly well about 2 months ago he fell back up the stairs landing on his left elbow and hip since that he's had off-and-on soreness especially when he starts activities it does settle down after a few minutes he denies any significant loss of feeling or numbness or neurological deficits he has chronic ache in his lower back and he comes here for evaluation he was sent for an AP pelvis and a 2 view left hip      Patient Active Problem List   Diagnosis    Back pain    Left hip pain    Trochanteric bursitis    Status post lumbar surgery    Status post lumbar spinal fusion    Postlaminectomy syndrome, lumbar region    DDD (degenerative disc disease), lumbosacral    Contusion of elbow    SVT (supraventricular tachycardia) (HCC)    Trochanteric bursitis of left hip    Elbow pain, right    Lateral epicondylitis of right elbow    Trochanteric bursitis, left hip    Primary osteoarthritis of left hip    Arthritis of left hip    Status post total hip replacement, left 4/4/17           Current Outpatient Prescriptions on File Prior to Visit   Medication Sig Dispense Refill    gabapentin (NEURONTIN) 800 MG tablet       nabumetone (RELAFEN) 750 MG tablet       oxybutynin (DITROPAN-XL) 10 MG extended release tablet       rivaroxaban (XARELTO) 10 MG TABS tablet Take 1 tablet by mouth daily Begin day after discharge from hospital and continue for 30 total days 30 tablet 0    amitriptyline (ELAVIL) 75 MG tablet Take 75 mg by mouth nightly as needed       augmented betamethasone dipropionate (DIPROLENE-AF) 0.05 % cream Apply thin layer topically to L ankle every third day 45 g 1    gabapentin (NEURONTIN) 600 MG tablet Take 600 mg by mouth 3 times daily.  metformin (GLUCOPHAGE) 500 MG tablet Take 500 mg by mouth 2 times daily (with meals) Takes only as needed      metoprolol (TOPROL-XL) 25 MG XL tablet Take 25 mg by mouth daily       zolpidem (AMBIEN) 5 MG tablet Take 5 mg by mouth nightly as needed       baclofen (LIORESAL) 10 MG tablet Take 10 mg by mouth 3 times daily.  methadone (DOLOPHINE) 5 MG tablet Take 5 mg by mouth 3 times daily  .  PRESTIGE SMART SYSTEM TEST STP strip       Multiple Vitamins-Minerals (CENTRUM SILVER PO) Take  by mouth.  lisinopril-hydrochlorothiazide (PRINZIDE;ZESTORETIC) 20-25 MG per tablet Take 1 Tab by mouth daily.  atorvastatin (LIPITOR) 20 MG tablet Take 20 mg by mouth nightly        No current facility-administered medications on file prior to visit. Objective:   Blood pressure 110/66, pulse 58, temperature 97.8 °F (36.6 °C), temperature source Temporal, height 5' 9\" (1.753 m), weight 251 lb (113.9 kg). His examination is consistent with a pleasant 75-year-old gentleman in mild distress he is stiff he walks with antalgia he has got a positive straight leg raise he does have pain with internal and external rotation of the left hip and palpable tenderness over the greater trochanteric area he states that with abduction and adduction he has soreness that's both on the lateral and in the medial aspect of his left hip x-ray has good range of motion neurologically he is intact to the left foot with good dorsiflexion and plantarflexion strength  Neuro exam grossly intact both lower extremities. Intact sensation to light touch. Motor exam 4+ to 5/5 in all major motor groups. Negative Waggoner's sign.     Skin is warm, dry and intact with out erythema or significant increased temperature around the knee joint(s). There are no cutaneous lesions or lymphadenopathy present. X-RAYS:  X-rays taken at the office today included an AP pelvis and a 2 view left hip he can see the hardware in the lower back and a well-placed left hip replacement with no changes fractures or bone abnormalities seen around the left hip replacement no lucencies and the ball is well centered in the cup. Assessment:  Left hip bursitis lumbar radiculopathy possible groin strain    Plan:  During today's visit, there was approximately 20 minutes of face-to-face discussion in regards to the patient's current condition and treatment options. More than 50 % of the time was counseling and coordination of care.       At this point we talked about the big picture I think he needs to get back into physical therapy to work on his core strength in his lower back radiculopathy his left hip appears to be intact but I think he has some bursitis and with his consent I did inject him with 1 cc Kenalog and 2 cc of Marcaine into the left trochanteric area she tolerated it well I went through some home exercises for his hip and we'll have him work on that in physical therapy and muscle going to have him take Aleve twice a day and follow up with me in 4 weeks      PROCEDURE NOTE:   left hip cortisone injection for the bursitis      They will schedule a follow up in about a month    Kenalog:  NDC: 4728-6845-02  Lot Number: [id number]  Expiration Date: 1/20

## 2018-10-03 ENCOUNTER — HOSPITAL ENCOUNTER (OUTPATIENT)
Dept: PHYSICAL THERAPY | Age: 63
Setting detail: THERAPIES SERIES
Discharge: HOME | End: 2018-10-03
Payer: COMMERCIAL

## 2018-10-03 PROCEDURE — 97163 PT EVAL HIGH COMPLEX 45 MIN: CPT

## 2018-10-03 PROCEDURE — G8978 MOBILITY CURRENT STATUS: HCPCS

## 2018-10-03 PROCEDURE — G0283 ELEC STIM OTHER THAN WOUND: HCPCS

## 2018-10-03 PROCEDURE — G8979 MOBILITY GOAL STATUS: HCPCS

## 2018-10-03 PROCEDURE — 97140 MANUAL THERAPY 1/> REGIONS: CPT

## 2018-10-03 ASSESSMENT — PAIN SCALES - GENERAL: PAINLEVEL_OUTOF10: 9

## 2018-10-03 ASSESSMENT — PAIN DESCRIPTION - PAIN TYPE: TYPE: CHRONIC PAIN

## 2018-10-03 ASSESSMENT — PAIN DESCRIPTION - ONSET: ONSET: ON-GOING

## 2018-10-03 ASSESSMENT — PAIN DESCRIPTION - DESCRIPTORS: DESCRIPTORS: BURNING;SHARP

## 2018-10-03 ASSESSMENT — PAIN DESCRIPTION - FREQUENCY: FREQUENCY: INTERMITTENT

## 2018-10-03 ASSESSMENT — PAIN DESCRIPTION - DIRECTION: RADIATING_TOWARDS: LEFT LATERAL HIP

## 2018-10-03 ASSESSMENT — PAIN DESCRIPTION - LOCATION: LOCATION: BACK;LEG;HIP

## 2018-10-03 ASSESSMENT — PAIN DESCRIPTION - ORIENTATION: ORIENTATION: LEFT

## 2018-10-03 ASSESSMENT — PAIN DESCRIPTION - PROGRESSION: CLINICAL_PROGRESSION: NOT CHANGED

## 2018-10-03 NOTE — PROGRESS NOTES
restricted    OutComes Score                                           Goals  Short term goals  Time Frame for Short term goals: 6 weeks  Short term goal 1: Pt will increase core strength to support and stabilize lumbar spine   Short term goal 2: Pt will increase flexibility of lumbar muscles  Short term goal 3: Pt will be independent in HEP  Short term goal 4: Pt will note decrease of pain to 3/10 on average  Patient Goals   Patient goals :  \"Decrease pain\"       Therapy Time   Individual Concurrent Group Co-treatment   Time In 1100         Time Out 1215         Minutes 75         Timed Code Treatment Minutes: 140[facility]

## 2018-10-03 NOTE — PLAN OF CARE
.      Outpatient Physical Therapy  [] [facility]    Phone: [phone]   Fax: [phone]   [x] [facility]  Phone: [phone]              Fax: [phone]  [] [facility]   Phone: [phone]   Fax: [phone]     To: Referring Practitioner: Meryle Channel, PA      Patient: Stuart Medrano   : 1955   MRN: [id number]  Evaluation Date: 10/3/2018      Diagnosis Information:  · Diagnosis: Lumbar radiculopathy   · Treatment Diagnosis: Chronic low back pain, core weakness, tightness in left low back     Physical Therapy Certification/Re-Certification Form  Dear Meryle Channel,  The following patient has been evaluated for physical therapy services and for therapy to continue, Medicare requires monthly physician review of the treatment plan. Please review the attached evaluation and/or summary of the patient's plan of care, and verify that you agree therapy should continue by signing the attached document and sending it back to our office. Plan of Care/Treatment to date:  [x] Therapeutic Exercise    [x] Modalities:  [x] Therapeutic Activity     [] Ultrasound  [] Electrical Stimulation  [] Gait Training      [] Cervical Traction [] Lumbar Traction  [] Neuromuscular Re-education    [] Cold/hotpack [] Iontophoresis   [x] Instruction in HEP     Other:  [x] Manual Therapy      []             [] Aquatic Therapy      []           ? Frequency/Duration:  # Days per week: [] 1 day # Weeks: [] 1 week [] 5 weeks     [x] 2 days? [] 2 weeks [x] 6 weeks     [] 3 days   [] 3 weeks [] 7 weeks     [] 4 days   [] 4 weeks [] 8 weeks    Rehab Potential: [] Excellent [] Good [x] Fair  [] Poor       Electronically signed by:  John Servin PT      If you have any questions or concerns, please don't hesitate to call.   Thank you for your referral.      Physician Signature:________________________________Date:__________________  By signing above, therapists plan is approved by physician

## 2018-10-10 ENCOUNTER — HOSPITAL ENCOUNTER (OUTPATIENT)
Dept: PHYSICAL THERAPY | Age: 63
Setting detail: THERAPIES SERIES
Discharge: HOME | End: 2018-10-10
Payer: COMMERCIAL

## 2018-10-10 PROCEDURE — G0283 ELEC STIM OTHER THAN WOUND: HCPCS

## 2018-10-10 PROCEDURE — 97140 MANUAL THERAPY 1/> REGIONS: CPT

## 2018-10-10 PROCEDURE — 97110 THERAPEUTIC EXERCISES: CPT

## 2018-10-10 NOTE — FLOWSHEET NOTE
Physical Therapy Daily Treatment Note  Date:  10/10/2018    Patient Name:  Vannessa Arellano    :  1955  MRN: [id number]    Restrictions/Precautions:      Pertinent Medical History:      Medical/Treatment Diagnosis Information:  · Diagnosis: Lumbar radiculopathy  · Treatment Diagnosis: Chronic low back pain, core weakness, tightness in left low back    Insurance/Certification information:  PT Insurance Information: [facility]  Physician Information:  Referring Practitioner: JOSE ANTONIO Treadwell  Plan of care signed (Y/N):  Sent to Jaylyn Pineda 10/3/18  Visit# / total visits:    Pain level:       G-Code (if applicable):      Date / Visit # G-Code Applied: 10/3/18 / 1st visit  PT G-Codes  Functional Assessment Tool Used: Revised Oswestry  Score: 17  Functional Limitation: Mobility: Walking and moving around  Mobility: Walking and Moving Around Current Status (): At least 20 percent but less than 40 percent impaired, limited or restricted  Mobility: Walking and Moving Around Goal Status (): At least 1 percent but less than 20 percent impaired, limited or restricted    Progress Note: [x]  Yes  [x]  No  Next due by: Visit #10      History of Injury: See below    Subjective:  Subjective  Subjective: Patient reports back surgery in . . and . Pain has bothered him off and on for a long time. In August, he fell against a door knob. This hurt the upper back, but did not really flare up the lower back. He currently has complaint of pain in the  low back and left hip and left groin. When he saw Jaylyn Pineda,  he took x rays of the left hip and the prosthesis is well. Brandy Rollins He received an injection into the left hip and this did  help. 10/09/18 15 min late. Patient reports soreness on left side of low back and some in left hip.     Objective: See eval  Observation:   Test measurements:        Exercises:  Exercise/Equipment Resistance/Repetitions Other comments   LTR 3 min    Piriformis stretch without compromise to left hip replacement 3 x 30 sec     PPT with pillow between knees. 5 sec x 10                                                              Other Therapeutic Activities:  Patient was educated on diagnosis, plan of care and prognosis of their complaint. Also, frequency and duration of treatments was discussed. Patient was informed of the attendance policy and issued a copy for their records. Home Exercise Program: 10/09/18 Patient was given written instructions for home exercises as above. Patient performs them correctly and understands purpose. Manual Treatments:    Manual stretching of lumbar paraspinals and DTM to left buttock and left low back x 12 min    Modalities:    Seated for IFC with MHP to low back (3 electrodes on left and 1 on right low back) x 15 min    Timed Code Treatment Minutes:    30  Total Treatment Minutes:    45    Treatment/Activity Tolerance:  [x] Patient tolerated treatment well [] Patient limited by fatigue  [] Patient limited by pain  [] Patient limited by other medical complications  [] Other:     Prognosis: [] Good [x] Fair  [] Poor    Goals:    Short term goals  Time Frame for Short term goals: 6 weeks  Short term goal 1: Pt will increase core strength to support and stabilize lumbar spine   Short term goal 2: Pt will increase flexibility of lumbar muscles  Short term goal 3: Pt will be independent in HEP  Short term goal 4: Pt will note decrease of pain to 3/10 on average              Patient Requires Follow-up: [x] Yes  [] No    Plan:   [] Continue per plan of care [] Alter current plan (see comments)  [x] Plan of care initiated [] Hold pending MD visit [] Discharge    Plan for Next Session: Continue with manual therapy and IFC. Add exercises.     Electronically signed by:  Cale Lester PTA,

## 2018-10-12 ENCOUNTER — HOSPITAL ENCOUNTER (OUTPATIENT)
Dept: PHYSICAL THERAPY | Age: 63
Setting detail: THERAPIES SERIES
Discharge: HOME | End: 2018-10-12
Payer: COMMERCIAL

## 2018-10-12 PROCEDURE — G0283 ELEC STIM OTHER THAN WOUND: HCPCS

## 2018-10-12 PROCEDURE — 97140 MANUAL THERAPY 1/> REGIONS: CPT

## 2018-10-12 PROCEDURE — 97110 THERAPEUTIC EXERCISES: CPT

## 2018-10-19 ENCOUNTER — HOSPITAL ENCOUNTER (OUTPATIENT)
Dept: PHYSICAL THERAPY | Age: 63
Setting detail: THERAPIES SERIES
Discharge: HOME | End: 2018-10-19
Payer: COMMERCIAL

## 2018-10-19 PROCEDURE — 97140 MANUAL THERAPY 1/> REGIONS: CPT

## 2018-10-19 PROCEDURE — G0283 ELEC STIM OTHER THAN WOUND: HCPCS

## 2018-10-19 PROCEDURE — 97110 THERAPEUTIC EXERCISES: CPT

## 2018-10-24 ENCOUNTER — APPOINTMENT (OUTPATIENT)
Dept: PHYSICAL THERAPY | Age: 63
End: 2018-10-24
Payer: COMMERCIAL

## 2018-10-26 ENCOUNTER — HOSPITAL ENCOUNTER (OUTPATIENT)
Dept: PHYSICAL THERAPY | Age: 63
Setting detail: THERAPIES SERIES
Discharge: HOME | End: 2018-10-26
Payer: COMMERCIAL

## 2018-10-26 PROCEDURE — 97110 THERAPEUTIC EXERCISES: CPT

## 2018-10-26 PROCEDURE — 97140 MANUAL THERAPY 1/> REGIONS: CPT

## 2018-10-26 PROCEDURE — G0283 ELEC STIM OTHER THAN WOUND: HCPCS

## 2018-10-29 ENCOUNTER — HOSPITAL ENCOUNTER (OUTPATIENT)
Dept: PHYSICAL THERAPY | Age: 63
Setting detail: THERAPIES SERIES
Discharge: HOME | End: 2018-10-29
Payer: COMMERCIAL

## 2018-10-29 PROCEDURE — G0283 ELEC STIM OTHER THAN WOUND: HCPCS

## 2018-10-29 PROCEDURE — 97110 THERAPEUTIC EXERCISES: CPT

## 2018-10-29 PROCEDURE — 97140 MANUAL THERAPY 1/> REGIONS: CPT

## 2018-10-29 NOTE — FLOWSHEET NOTE
Physical Therapy Daily Treatment Note  Date:  10/29/2018    Patient Name:  Willy Jackson    :  1955  MRN: [id number]    Restrictions/Precautions:      Pertinent Medical History:      Medical/Treatment Diagnosis Information:  · Diagnosis: Lumbar radiculopathy  · Treatment Diagnosis: Chronic low back pain, core weakness, tightness in left low back    Insurance/Certification information:  PT Insurance Information: [facility]  Physician Information:  Referring Practitioner: JOSE ANTONIO Iraheta  Plan of care signed (Y/N):  Sent to Lubna Chavis 10/3/18  Visit# / total visits:    Pain level:  3/18     G-Code (if applicable):      Date / Visit # G-Code Applied: 10/3/18 / 1st visit  PT G-Codes  Functional Assessment Tool Used: Revised Oswestry  Score: 17  Functional Limitation: Mobility: Walking and moving around  Mobility: Walking and Moving Around Current Status (): At least 20 percent but less than 40 percent impaired, limited or restricted  Mobility: Walking and Moving Around Goal Status (): At least 1 percent but less than 20 percent impaired, limited or restricted    Progress Note: [x]  Yes  [x]  No  Next due by: Visit #10      History of Injury: See below    Subjective:  Subjective  Subjective: Patient reports back surgery in . . and . Pain has bothered him off and on for a long time. In August, he fell against a door knob. This hurt the upper back, but did not really flare up the lower back. He currently has complaint of pain in the  low back and left hip and left groin. When he saw Lubna Chavis,  he took x rays of the left hip and the prosthesis is well. Larisarichard Lovelace He received an injection into the left hip and this did  help. 10/09/18 15 min late. Patient reports soreness on left side of low back and some in left hip.  10/12/18: 15 min late: Pt reports he is stiff, as he is most mornings.  \"I saw a back doctor yesterday and he is talking about a cortisone injection into my hip.\"  10/19/18 15 min late. States stiffness on left side of the back and hip.  10/26/18: 10 min late. Still feels stiffness on left side. He reports that he worked in the yard last night and then slept with ice pack and hot pack. Feels Ok today. 10/29/18: Pt reports that his pain is less. Objective: See eval  Observation:   Test measurements:        Exercises:  Exercise/Equipment Resistance/Repetitions Other comments   LTR 3 min    Piriformis stretch without compromise to left hip replacement 3 x 30 sec     PPT with pillow between knees. 5 sec x 10    Clamshells on left only (R sidelying) 20 X each side 10/12/18   Bridging 5 sec x 10 10/26   PPT with Fall outs 5 sec x 5 10/26                                              Other Therapeutic Activities:  Patient was educated on diagnosis, plan of care and prognosis of their complaint. Also, frequency and duration of treatments was discussed. Patient was informed of the attendance policy and issued a copy for their records. Home Exercise Program: 10/09/18 Patient was given written instructions for home exercises as above. Patient performs them correctly and understands purpose.     Manual Treatments:    Manual stretching of lumbar paraspinals and DTM to left buttock and left low back x 15 min    Modalities:    Prone for IFC with MHP to low back (3 electrodes on left and 1 on right low back) x 15 min    Timed Code Treatment Minutes:    45  Total Treatment Minutes:    60    Treatment/Activity Tolerance:  [x] Patient tolerated treatment well [] Patient limited by fatigue  [] Patient limited by pain  [] Patient limited by other medical complications  [] Other:     Prognosis: [] Good [x] Fair  [] Poor    Goals:    Short term goals  Time Frame for Short term goals: 6 weeks  Short term goal 1: Pt will increase core strength to support and stabilize lumbar spine   Short term goal 2: Pt will increase flexibility of lumbar muscles  Short term goal 3: Pt will be

## 2018-10-31 ENCOUNTER — HOSPITAL ENCOUNTER (OUTPATIENT)
Dept: PHYSICAL THERAPY | Age: 63
Setting detail: THERAPIES SERIES
Discharge: HOME | End: 2018-10-31
Payer: COMMERCIAL

## 2018-10-31 PROCEDURE — 97140 MANUAL THERAPY 1/> REGIONS: CPT

## 2018-10-31 PROCEDURE — G0283 ELEC STIM OTHER THAN WOUND: HCPCS

## 2018-11-01 ENCOUNTER — OFFICE VISIT (OUTPATIENT)
Dept: ORTHOPEDIC SURGERY | Age: 63
End: 2018-11-01
Payer: COMMERCIAL

## 2018-11-01 VITALS — WEIGHT: 251 LBS | HEIGHT: 69 IN | BODY MASS INDEX: 37.18 KG/M2

## 2018-11-01 DIAGNOSIS — M77.12 LATERAL EPICONDYLITIS OF LEFT ELBOW: Primary | ICD-10-CM

## 2018-11-01 PROCEDURE — 3017F COLORECTAL CA SCREEN DOC REV: CPT | Performed by: PHYSICIAN ASSISTANT

## 2018-11-01 PROCEDURE — 1036F TOBACCO NON-USER: CPT | Performed by: PHYSICIAN ASSISTANT

## 2018-11-01 PROCEDURE — 1123F ACP DISCUSS/DSCN MKR DOCD: CPT | Performed by: PHYSICIAN ASSISTANT

## 2018-11-01 PROCEDURE — G8417 CALC BMI ABV UP PARAM F/U: HCPCS | Performed by: PHYSICIAN ASSISTANT

## 2018-11-01 PROCEDURE — 99213 OFFICE O/P EST LOW 20 MIN: CPT | Performed by: PHYSICIAN ASSISTANT

## 2018-11-01 PROCEDURE — 20551 NJX 1 TENDON ORIGIN/INSJ: CPT | Performed by: PHYSICIAN ASSISTANT

## 2018-11-01 PROCEDURE — 1101F PT FALLS ASSESS-DOCD LE1/YR: CPT | Performed by: PHYSICIAN ASSISTANT

## 2018-11-01 PROCEDURE — G8484 FLU IMMUNIZE NO ADMIN: HCPCS | Performed by: PHYSICIAN ASSISTANT

## 2018-11-01 PROCEDURE — 4040F PNEUMOC VAC/ADMIN/RCVD: CPT | Performed by: PHYSICIAN ASSISTANT

## 2018-11-01 PROCEDURE — G8427 DOCREV CUR MEDS BY ELIG CLIN: HCPCS | Performed by: PHYSICIAN ASSISTANT

## 2018-11-05 ENCOUNTER — HOSPITAL ENCOUNTER (OUTPATIENT)
Dept: PHYSICAL THERAPY | Age: 63
Setting detail: THERAPIES SERIES
Discharge: HOME | End: 2018-11-05
Payer: COMMERCIAL

## 2018-11-05 PROCEDURE — 97530 THERAPEUTIC ACTIVITIES: CPT

## 2018-11-05 PROCEDURE — G0283 ELEC STIM OTHER THAN WOUND: HCPCS

## 2018-11-05 PROCEDURE — G8979 MOBILITY GOAL STATUS: HCPCS

## 2018-11-05 PROCEDURE — 97110 THERAPEUTIC EXERCISES: CPT

## 2018-11-05 PROCEDURE — 97140 MANUAL THERAPY 1/> REGIONS: CPT

## 2018-11-05 PROCEDURE — G8980 MOBILITY D/C STATUS: HCPCS

## 2018-11-05 NOTE — FLOWSHEET NOTE
Physical Therapy Daily Treatment Note  Date:  2018    Patient Name:  Alondra Blanca    :  1955  MRN: [id number]    Restrictions/Precautions:      Pertinent Medical History:      Medical/Treatment Diagnosis Information:  · Diagnosis: Lumbar radiculopathy  · Treatment Diagnosis: Chronic low back pain, core weakness, tightness in left low back    Insurance/Certification information:  PT Insurance Information: [facility]  Physician Information:  Referring Practitioner: JOSE ANTONIO Juan  Plan of care signed (Y/N):  Sent to Aranza Frederick 10/3/18  Visit# / total visits:    Pain level:       G-Code (if applicable):      Date / Visit # G-Code Applied: 18 visit  PT G-Codes  Functional Assessment Tool Used: Revised Oswestry  Score: 18  Functional Limitation: Mobility: Walking and moving around  Mobility: Walking and Moving Around Discharge Status (): At least 20 percent but less than 40 percent impaired, limited or restricted  Mobility: Walking and Moving Around Goal Status (): At least 1 percent but less than 20 percent impaired, limited or restricted    Progress Note: [x]  Yes  [x]  No  Next due by: Visit #10      History of Injury: See below    Subjective:  Subjective  Subjective: Patient reports back surgery in . . and . Pain has bothered him off and on for a long time. In August, he fell against a door knob. This hurt the upper back, but did not really flare up the lower back. He currently has complaint of pain in the  low back and left hip and left groin. When he saw Aranza Frederick,  he took x rays of the left hip and the prosthesis is well. Ainsley Simms He received an injection into the left hip and this did  help. 10/09/18 15 min late. Patient reports soreness on left side of low back and some in left hip.  10/12/18: 15 min late: Pt reports he is stiff, as he is most mornings.  \"I saw a back doctor yesterday and he is talking about a cortisone injection into my hip.\"  10/19/18 15 min late. States stiffness on left side of the back and hip.  10/26/18: 10 min late. Still feels stiffness on left side. He reports that he worked in the yard last night and then slept with ice pack and hot pack. Feels Ok today. 10/29/18: Pt reports that his pain is less. 10/31/18: 18 min late today. Back pain is worse today due to weather and plumbing last night. 11/5/18: Pt reports that he had a to stand last night and so has a little more pain. Objective: Observation:   Test measurements: 11/5/18: Trunk Mobility: Flex: 105 Ext:  19 SB L:12  SB R:  23,  ROT L and R:  WNL                                     Core strength: 4+/5. Strength of LE's: grossly 4+/5      Exercises:  Exercise/Equipment Resistance/Repetitions Other comments   LTR 3 min    Piriformis stretch without compromise to left hip replacement 3 x 30 sec     PPT with pillow between knees. 5 sec x 10    Clamshells on left only (R sidelying) 20 X each side 10/12/18   Bridging 5 sec x 10 10/26   PPT with Fall outs 5 sec x 5 10/26                                              Other Therapeutic Activities:  Patient was educated on diagnosis, plan of care and prognosis of their complaint. Also, frequency and duration of treatments was discussed. Patient was informed of the attendance policy and issued a copy for their records. 11/5/18: Reviewed goals and revised Oswestry    Home Exercise Program: 10/09/18 Patient was given written instructions for home exercises as above. Patient performs them correctly and understands purpose. 11/5/18: Reviewed HEP.     Manual Treatments:    Manual stretching of lumbar paraspinals and DTM to left buttock and left low back x 15 min    Modalities:    Seated for IFC with MHP to low back (3 electrodes on left and 1 on right low back) x 15 min    Timed Code Treatment Minutes:    45  Total Treatment Minutes:    60    Treatment/Activity Tolerance:  [x] Patient tolerated treatment well [] Patient

## 2018-11-05 NOTE — PROGRESS NOTES
[ ]F. I have no social life because of the pain    SECTION 5 - Sitting   [ ]A. I can sit in any chair as long as I like  [ ]B. I can only sit in my favorite chair as long as I like  [X ]C. Pain prevents me from sitting more than 1 hour  [ ]D. Pain prevents me from sitting more than 1/2 hour  [ Aamir Christiansen. Pain prevents me from sitting more than ten minutes  [ ]F. Pain prevents me from sitting at all  SECTION 10 - Traveling   [ ]A. I get no pain while traveling.   [ ]B. I get some pain while traveling, but none of my usual forms of travel make it any worse. [x ]C. I get extra pain while traveling, but it does not compel me to seek alternative forms of travel.   [ ]D. I get extra pain while traveling, which compels me to seek alternative forms of travel.   [ Aamir Christiansen. Pain restricts all forms of travel  [ ]F. Pain prevents all forms of travel except that done lying down     COMMENTS:     Patient Score: 18      Scoring Method for the Oswestry Low Back Disability Questionnaire      1. Each of the 10 sections is scored separately (0 to 5 points each) then added up (max. Total = 50). EXAMPLE:  Section 1. Pain Intensity  Item Score  Item Description  Point Value    A  I have no pain at the moment  0    B  The pain is very mild at the moment  1    C  The pain is moderate at the moment  2    D  The pain is fairly severe at the moment  3    E  The pain is very severe at the moment  4    F  The pain is the worst imaginable  5      2. If all 10 sections are completed, simply double the patient's score. 15    3. If a section is omitted, divide the patient's total score by the number of sections completed times 5. FORMULA: [(Patient's score) / (# Sections Completed X 5)] X 100 = __30__% Disability    EXAMPLE:  If number of sections completed = 9  If patient's score = 22  The equation = [22 / (9 X 5)] X 100 = 48.9% Disability    4.  Interpretation of disability scores:  SCORE  SCORE    0-20% Minimal Disability  Can cope with most ADL's. Usually no treatment needed, apart from advice on lifting, sitting, posture, physical fitness and diet. In this group, some patients have particular difficulty with sitting and this may be important if their occupation is sedentary (, , etc.)    44-32% Moderate Disability  This group experiences more pain and problems with sitting, lifting, and standing. Travel and social life are more difficult and may well be off work. Personal care, sexual activity, and sleeping ar not grossly affected, and the back condition can usually be managed by conservative means. 40-60% Severe   Disability  Pain remains the main problem in this group of patients by travel, personal care, social life, sexual activity, and sleep are also affected. These patients require detailed investigation. 60-80% Crippled Back pain impinges on all aspects of these patients' lives both at home and at work. Positive intervention is required. % Bed-bound or exaggerating  These patients are either bed-bound or exaggerating their symptoms. This can be evaluated by careful observation of the patient during the medical examination. Binta Saravia. And BRIANA Guerra (2000). \"The [place] Disability Questionnaire and the Oswestry Disability Questionnaire. \" Spine 25(24): 3114-24              G-Code [place]:  Oswestry Total Score  Disability Index  CMS Modifier    0  0%  [ [address]    1-9  1-19%  [ ]CI    10-19  20-39%  [X ]CJ    20-29  40-59%  [ ]CK    30-39  60-79%  [ ]CL    40-49  80-99%  [ ]CM    50  100%  [ Ivanna Pulido

## 2018-12-31 ENCOUNTER — TELEPHONE (OUTPATIENT)
Dept: ORTHOPEDIC SURGERY | Age: 63
End: 2018-12-31

## 2019-02-11 ENCOUNTER — OFFICE VISIT (OUTPATIENT)
Dept: ORTHOPEDIC SURGERY | Age: 64
End: 2019-02-11
Payer: COMMERCIAL

## 2019-02-11 VITALS
WEIGHT: 253.6 LBS | DIASTOLIC BLOOD PRESSURE: 75 MMHG | HEART RATE: 85 BPM | TEMPERATURE: 97.8 F | HEIGHT: 69 IN | SYSTOLIC BLOOD PRESSURE: 123 MMHG | RESPIRATION RATE: 16 BRPM | BODY MASS INDEX: 37.56 KG/M2

## 2019-02-11 DIAGNOSIS — M75.82 ROTATOR CUFF TENDONITIS, LEFT: ICD-10-CM

## 2019-02-11 DIAGNOSIS — M77.12 LATERAL EPICONDYLITIS OF LEFT ELBOW: Primary | ICD-10-CM

## 2019-02-11 PROCEDURE — 99213 OFFICE O/P EST LOW 20 MIN: CPT | Performed by: PHYSICIAN ASSISTANT

## 2019-02-11 PROCEDURE — G8427 DOCREV CUR MEDS BY ELIG CLIN: HCPCS | Performed by: PHYSICIAN ASSISTANT

## 2019-02-11 PROCEDURE — 1101F PT FALLS ASSESS-DOCD LE1/YR: CPT | Performed by: PHYSICIAN ASSISTANT

## 2019-02-11 PROCEDURE — 20605 DRAIN/INJ JOINT/BURSA W/O US: CPT | Performed by: PHYSICIAN ASSISTANT

## 2019-02-11 PROCEDURE — 3017F COLORECTAL CA SCREEN DOC REV: CPT | Performed by: PHYSICIAN ASSISTANT

## 2019-02-11 PROCEDURE — 1123F ACP DISCUSS/DSCN MKR DOCD: CPT | Performed by: PHYSICIAN ASSISTANT

## 2019-02-11 PROCEDURE — 4040F PNEUMOC VAC/ADMIN/RCVD: CPT | Performed by: PHYSICIAN ASSISTANT

## 2019-02-11 PROCEDURE — 1036F TOBACCO NON-USER: CPT | Performed by: PHYSICIAN ASSISTANT

## 2019-02-11 PROCEDURE — 20610 DRAIN/INJ JOINT/BURSA W/O US: CPT | Performed by: PHYSICIAN ASSISTANT

## 2019-02-11 PROCEDURE — G8417 CALC BMI ABV UP PARAM F/U: HCPCS | Performed by: PHYSICIAN ASSISTANT

## 2019-02-11 PROCEDURE — G8484 FLU IMMUNIZE NO ADMIN: HCPCS | Performed by: PHYSICIAN ASSISTANT

## 2019-03-13 ENCOUNTER — OFFICE VISIT (OUTPATIENT)
Dept: ORTHOPEDIC SURGERY | Age: 64
End: 2019-03-13
Payer: COMMERCIAL

## 2019-03-13 VITALS
WEIGHT: 253.6 LBS | BODY MASS INDEX: 37.56 KG/M2 | DIASTOLIC BLOOD PRESSURE: 79 MMHG | RESPIRATION RATE: 16 BRPM | SYSTOLIC BLOOD PRESSURE: 131 MMHG | HEART RATE: 83 BPM | TEMPERATURE: 98 F | HEIGHT: 69 IN

## 2019-03-13 DIAGNOSIS — Z86.59 HISTORY OF CLAUSTROPHOBIA: ICD-10-CM

## 2019-03-13 DIAGNOSIS — M75.112 INCOMPLETE TEAR OF LEFT ROTATOR CUFF: ICD-10-CM

## 2019-03-13 DIAGNOSIS — M75.82 ROTATOR CUFF TENDONITIS, LEFT: Primary | ICD-10-CM

## 2019-03-13 PROCEDURE — 4040F PNEUMOC VAC/ADMIN/RCVD: CPT | Performed by: PHYSICIAN ASSISTANT

## 2019-03-13 PROCEDURE — 1036F TOBACCO NON-USER: CPT | Performed by: PHYSICIAN ASSISTANT

## 2019-03-13 PROCEDURE — 1123F ACP DISCUSS/DSCN MKR DOCD: CPT | Performed by: PHYSICIAN ASSISTANT

## 2019-03-13 PROCEDURE — 3017F COLORECTAL CA SCREEN DOC REV: CPT | Performed by: PHYSICIAN ASSISTANT

## 2019-03-13 PROCEDURE — G8417 CALC BMI ABV UP PARAM F/U: HCPCS | Performed by: PHYSICIAN ASSISTANT

## 2019-03-13 PROCEDURE — 1101F PT FALLS ASSESS-DOCD LE1/YR: CPT | Performed by: PHYSICIAN ASSISTANT

## 2019-03-13 PROCEDURE — 99213 OFFICE O/P EST LOW 20 MIN: CPT | Performed by: PHYSICIAN ASSISTANT

## 2019-03-13 PROCEDURE — G8427 DOCREV CUR MEDS BY ELIG CLIN: HCPCS | Performed by: PHYSICIAN ASSISTANT

## 2019-03-13 PROCEDURE — G8484 FLU IMMUNIZE NO ADMIN: HCPCS | Performed by: PHYSICIAN ASSISTANT

## 2019-03-13 RX ORDER — DIAZEPAM 10 MG/1
10 TABLET ORAL PRN
Qty: 2 TABLET | Refills: 0 | Status: SHIPPED | OUTPATIENT
Start: 2019-03-13 | End: 2019-03-29

## 2019-03-14 ENCOUNTER — TELEPHONE (OUTPATIENT)
Dept: ORTHOPEDIC SURGERY | Age: 64
End: 2019-03-14

## 2019-03-26 ENCOUNTER — HOSPITAL ENCOUNTER (OUTPATIENT)
Dept: MRI IMAGING | Age: 64
Discharge: HOME | End: 2019-03-26
Payer: COMMERCIAL

## 2019-03-26 DIAGNOSIS — M75.112 INCOMPLETE TEAR OF LEFT ROTATOR CUFF: ICD-10-CM

## 2019-03-26 DIAGNOSIS — Z86.59 HISTORY OF CLAUSTROPHOBIA: ICD-10-CM

## 2019-03-26 PROCEDURE — 73221 MRI JOINT UPR EXTREM W/O DYE: CPT

## 2019-03-29 ENCOUNTER — TELEPHONE (OUTPATIENT)
Dept: ORTHOPEDIC SURGERY | Age: 64
End: 2019-03-29

## 2019-03-29 DIAGNOSIS — M12.812 ROTATOR CUFF TEAR ARTHROPATHY OF LEFT SHOULDER: Primary | ICD-10-CM

## 2019-03-29 DIAGNOSIS — M75.102 ROTATOR CUFF TEAR ARTHROPATHY OF LEFT SHOULDER: Primary | ICD-10-CM

## 2019-04-01 NOTE — TELEPHONE ENCOUNTER
I called and discussed MRI results with Darrick Heck. I am recommending outpatient physical therapy for his shoulder. 2-3 times a week for the next 6 weeks. He should've follow-up in the office in 6 weeks for further evaluation.   A new prescription will be sent to outpatient physical therapy at [facility].

## 2019-04-17 ENCOUNTER — HOSPITAL ENCOUNTER (OUTPATIENT)
Dept: PHYSICAL THERAPY | Age: 64
Setting detail: THERAPIES SERIES
Discharge: HOME | End: 2019-04-17
Payer: COMMERCIAL

## 2019-04-17 PROCEDURE — 97140 MANUAL THERAPY 1/> REGIONS: CPT

## 2019-04-17 PROCEDURE — 97110 THERAPEUTIC EXERCISES: CPT

## 2019-04-17 PROCEDURE — 97162 PT EVAL MOD COMPLEX 30 MIN: CPT

## 2019-04-17 ASSESSMENT — PAIN DESCRIPTION - ONSET: ONSET: ON-GOING

## 2019-04-17 ASSESSMENT — PAIN DESCRIPTION - LOCATION: LOCATION: SHOULDER

## 2019-04-17 ASSESSMENT — PAIN DESCRIPTION - DESCRIPTORS: DESCRIPTORS: ACHING;SHOOTING;SORE;SHARP;CONSTANT

## 2019-04-17 ASSESSMENT — PAIN - FUNCTIONAL ASSESSMENT: PAIN_FUNCTIONAL_ASSESSMENT: PREVENTS OR INTERFERES SOME ACTIVE ACTIVITIES AND ADLS

## 2019-04-17 ASSESSMENT — PAIN DESCRIPTION - PAIN TYPE: TYPE: CHRONIC PAIN

## 2019-04-17 ASSESSMENT — PAIN DESCRIPTION - FREQUENCY: FREQUENCY: CONTINUOUS

## 2019-04-17 ASSESSMENT — PAIN DESCRIPTION - ORIENTATION: ORIENTATION: LEFT

## 2019-04-17 ASSESSMENT — PAIN SCALES - GENERAL: PAINLEVEL_OUTOF10: 9

## 2019-04-17 ASSESSMENT — PAIN DESCRIPTION - PROGRESSION: CLINICAL_PROGRESSION: GRADUALLY WORSENING

## 2019-04-17 NOTE — FLOWSHEET NOTE
Physical Therapy Daily Treatment Note  Date:  2019    Patient Name:  Tawanna Schmitz    :  1955  MRN: [id number]  Restrictions/Precautions:    Pertinent Medical History:  Medical/Treatment Diagnosis Information:  · Diagnosis: RTC Tear Arthroplasty, left shoulder  · Treatment Diagnosis: decreased abilty to use his left ue for adl's  Insurance/Certification information:  PT Insurance Information: medicare  Physician Information:  Referring Practitioner: Sejal Dennison of care signed (Y/N):  routed  Visit# / total visits:   Pain level: 8/10            Progress Note: []  Yes  []  No  Next due by: Visit #10      History of Injury:Pt is a 71 y/o male with a hx of left shoulder and elbow pain stareting on 18 with no known injury. He c/o constant pain in his left shoulder which is severe at times and wakes him. He had a left shoulder scope in . He has been told by the md that he needs a TSA. He says ihe is not able to  use it for all of his adl's. He hopes to avouid surgery if possible and increase his activities.           Subjective:   Pt states, \" They want to put a new shoulder in \"    Objective:  Initial- flex-90, abd- 80, ir-60, er-50, strength- 4-/5    Exercises:  Exercise/Equipment Resistance/Repetitions Other comments   Nu step     isos     Sl  er     tslide     bilat ext     Ir/er     add                              HEP     scap retraction X 20     wall slide X 4 min    Ir/er X 30    Cane flex supine X 30                Other Therapeutic Activities:      Home Exercise Program:  Patient demonstrated proper technique, good tolerance,  and was given written instructions for the above exercises      Manual Treatments:  Prom all ranges, mfr to entire shoulder and scap, distraction gr 3, isos at end range x 15 min    Modalities: cp x 10     Timed Code Treatment Minutes:  30    Total Treatment Minutes:  70    Assessment  [x] Patient tolerated treatment well [] Patient limited by fatigue  [] Patient limited by pain  [] Patient limited by other medical complications  [] Other:         Prognosis: [] Good [x] Fair  [] Poor    Patient Requires Follow-up: [x] Yes  [] No    Plan:   [] Continue per plan of care [] Alter current plan (see comments)         MEDICARE  [x] Plan of care initiated [] Hold pending MD visit [] Discharge  Plan for Next Session:  UE arom, aarom, prom, strengthening, scapular strength, myofascial release, joint mobs to gh, sc, ac, scapular thoracic, modalities for pain, hep, He is going to hsve s tsa possibly. Getting him ready for that.       Electronically signed by:  Donald Verde PT

## 2019-04-17 NOTE — PROGRESS NOTES
Physical Therapy  Initial Assessment  Date: 2019  Patient Name: Dariela Mayorga  MRN: [id number]  : 1955     Treatment Diagnosis: decreased abilty to use his left ue for adl's    Restrictions  Restrictions/Precautions  Restrictions/Precautions: Fall Risk(mod)    Subjective   General  Chart Reviewed: Yes  Additional Pertinent Hx: plof- independent  Referring Practitioner: Oliverio Henson  Referral Date : 19  Diagnosis: RTC Tear Arthroplasty, left shoulder  PT Visit Information  Onset Date: 18  PT Insurance Information: medicare  Total # of Visits Approved: 12  Total # of Visits to Date: 1  Subjective  Subjective: Pt is a 69 y/o male with a hx of left shoulder and elbow pain stareting on 18 with no known injury. He c/o constant pain in his left shoulder which is severe at times and wakes him. He had a left shoulder scope in . He has been told by the md that he needs a TSA. He says ihe is not able to  use it for all of his adl's. He hopes to avouid surgery if possible and increase his activities. Pain Screening  Patient Currently in Pain: Yes  Pain Assessment  Pain Assessment: 0-10  Pain Level: 9  Patient's Stated Pain Goal: 1  Pain Type: Chronic pain  Pain Location: Shoulder  Pain Orientation: Left  Pain Descriptors: Aching; Shooting;Sore;Sharp; Constant  Pain Frequency: Continuous  Pain Onset: On-going  Clinical Progression: Gradually worsening  Functional Pain Assessment: Prevents or interferes some active activities and ADLs  Vital Signs  Patient Currently in Pain: Yes    Vision/Hearing       Orientation       Social/Functional History       Objective     Observation/Palpation  Posture: Fair  Palpation: tight and tender in biceps, rtc muscles, pecs, pecs minor, scap muscles, biceps, painful in ac joint, decreased gh joint motion  Observation: Posture- forward head, fb into 10 degrees flex, tight pecs, rounded shoulders, winging scap    AROM LUE (degrees)  LUE AROM : Exceptions  L Shoulder Flexion 0-180: 90  L Shoulder Extension 0-45: 10  L Shoulder ABduction 0-180: 80  L Shoulder Int Rotation  0-70: 50  L Shoulder Ext Rotation  0-90: 50  Spine  Cervical: wfl no pain  Thoracic: decreased uppe rthoracic joint mob    Strength LLE  Strength LLE: Exception  Strength LUE  Strength LUE: Exception  L Shoulder Flexion: 4-/5  L Shoulder Extension: 4-/5  L Shoulder ABduction: 4-/5  L Shoulder Internal Rotation: 4-/5  L Shoulder External Rotation: 4-/5  Strength Other  Other: scap- 4-/5                                                   Assessment   Conditions Requiring Skilled Therapeutic Intervention  Body structures, Functions, Activity limitations: Decreased functional mobility ; Decreased high-level IADLs;Decreased ROM; Decreased strength; Increased Pain  Assessment: plof- independent  Treatment Diagnosis: decreased abilty to use his left ue for adl's  Prognosis: Fair;Good  REQUIRES PT FOLLOW UP: Yes  Activity Tolerance  Activity Tolerance: Patient Tolerated treatment well         Plan   Plan  Times per week: 2x wk x  Plan weeks: 4-6 wks  Current Treatment Recommendations: Strengthening, ADL/Self-care Training, Manual Therapy - Joint Manipulation, Patient/Caregiver Education & Training, ROM, Neuromuscular Re-education, Modalities, Home Exercise Program, Manual Therapy - Soft Tissue Mobilization    G-Code       OutComes Score                                                  AM-PAC Score             Goals  Short term goals  Time Frame for Short term goals: 15  Short term goal 1: pt will have a 25 % increase in rom to help him meet his goals  Short term goal 2: pt will have 4+/5 strength to help him meet his goals  Patient Goals   Patient goals : Pts goal- \" I want to avoid surgery if possible, otherwise get ready for surgery \"       Therapy Time   Individual Concurrent Group Co-treatment   Time In  1130         Time Out  1240         Minutes  [address], PT

## 2019-04-24 ENCOUNTER — HOSPITAL ENCOUNTER (OUTPATIENT)
Dept: PHYSICAL THERAPY | Age: 64
Setting detail: THERAPIES SERIES
Discharge: HOME | End: 2019-04-24
Payer: COMMERCIAL

## 2019-04-24 NOTE — FLOWSHEET NOTE
Physical Therapy  Cancellation/No-show Note  Patient Name:  Glenys Heading  :  1955   Date:  2019  Cancelled visits to date: 1  No-shows to date: 0    For today's appointment patient:  [x]  Cancelled  []  Rescheduled appointment  []  No-show     Reason given by patient:  []  Patient ill  []  Conflicting appointment  []  No transportation    []  Conflict with work  []  No reason given  [x]  Other:     Comments: back is sore.      Electronically signed by:  Olga Holder PTA

## 2019-04-26 ENCOUNTER — HOSPITAL ENCOUNTER (OUTPATIENT)
Dept: PHYSICAL THERAPY | Age: 64
Setting detail: THERAPIES SERIES
Discharge: HOME | End: 2019-04-26
Payer: COMMERCIAL

## 2019-04-26 PROCEDURE — 97140 MANUAL THERAPY 1/> REGIONS: CPT

## 2019-04-26 PROCEDURE — 97110 THERAPEUTIC EXERCISES: CPT

## 2019-04-26 NOTE — FLOWSHEET NOTE
Physical Therapy Daily Treatment Note  Date:  2019    Patient Name:  Bruce Horner    :  1955  MRN: [id number]  Restrictions/Precautions:    Pertinent Medical History:  Medical/Treatment Diagnosis Information:  · Diagnosis: RTC Tear Arthroplasty, left shoulder  · Treatment Diagnosis: decreased abilty to use his left ue for adl's  Insurance/Certification information:  PT Insurance Information: medicare  Physician Information:  Referring Practitioner: Juju Aranda of care signed (Y/N):  routed  Visit# / total visits:   Pain level: 8/10            Progress Note: []  Yes  []  No  Next due by: Visit #10      History of Injury:Pt is a 71 y/o male with a hx of left shoulder and elbow pain stareting on 18 with no known injury. He c/o constant pain in his left shoulder which is severe at times and wakes him. He had a left shoulder scope in . He has been told by the md that he needs a TSA. He says ihe is not able to  use it for all of his adl's. He hopes to avouid surgery if possible and increase his activities.           Subjective:   Pt states, \" They want to put a new shoulder in \"  19  Pt states, \" SHOULDER HURTS LIKE HELL \",  15 MIN LATE    Objective:  Initial- flex-90, abd- 80, ir-60, er-50, strength- 4-/5    Exercises:  Exercise/Equipment Resistance/Repetitions Other comments   Nu step     isos X 10    Sl  er 2# x 30    Sl ranger X 2 min    tslide     bilat ext Green x 30    Ir/er     add     ranger X 30                        HEP     scap retraction X 20     wall slide X 4 min    Ir/er X 30    Cane flex supine X 30                Other Therapeutic Activities:      Home Exercise Program:  Patient demonstrated proper technique, good tolerance,  and was given written instructions for the above exercises      Manual Treatments:  Prom all ranges, mfr to entire shoulder and scap, distraction gr 3, isos at end range x 15 min    Modalities: cp x 10     Timed Code Treatment Minutes: 30    Total Treatment Minutes:  45    Assessment  [x] Patient tolerated treatment well [] Patient limited by fatigue  [] Patient limited by pain  [] Patient limited by other medical complications  [] Other:         Prognosis: [] Good [x] Fair  [] Poor    Patient Requires Follow-up: [x] Yes  [] No    Plan:   [] Continue per plan of care [] Alter current plan (see comments)         MEDICARE  [x] Plan of care initiated [] Hold pending MD visit [] Discharge  Plan for Next Session:  UE arom, aarom, prom, strengthening, scapular strength, myofascial release, joint mobs to gh, sc, ac, scapular thoracic, modalities for pain, hep, He is going to hsve s tsa possibly. Getting him ready for that.       Electronically signed by:  Prema Crow PT

## 2019-04-30 ENCOUNTER — HOSPITAL ENCOUNTER (OUTPATIENT)
Dept: PHYSICAL THERAPY | Age: 64
Setting detail: THERAPIES SERIES
Discharge: HOME | End: 2019-04-30
Payer: COMMERCIAL

## 2019-04-30 PROCEDURE — 97140 MANUAL THERAPY 1/> REGIONS: CPT

## 2019-04-30 PROCEDURE — 97110 THERAPEUTIC EXERCISES: CPT

## 2019-04-30 NOTE — FLOWSHEET NOTE
Physical Therapy Daily Treatment Note  Date:  2019    Patient Name:  Zenaida Hannah    :  1955  MRN: [id number]  Restrictions/Precautions:    Pertinent Medical History:  Medical/Treatment Diagnosis Information:  · Diagnosis: RTC Tear Arthroplasty, left shoulder  · Treatment Diagnosis: decreased abilty to use his left ue for adl's  Insurance/Certification information:  PT Insurance Information: medicare  Physician Information:  Referring Practitioner: Danyelle Estimable of care signed (Y/N):  routed  Visit# / total visits:   Pain level: 6/10            Progress Note: []  Yes  []  No  Next due by: Visit #10      History of Injury:Pt is a 71 y/o male with a hx of left shoulder and elbow pain stareting on 18 with no known injury. He c/o constant pain in his left shoulder which is severe at times and wakes him. He had a left shoulder scope in . He has been told by the md that he needs a TSA. He says ihe is not able to  use it for all of his adl's. He hopes to avouid surgery if possible and increase his activities. Subjective:   Pt states, \" They want to put a new shoulder in \"  19  Pt states, \" SHOULDER HURTS LIKE HELL \",  15 MIN LATE  19 10 min late. Patient reports shoulder feels stiff.     Objective:  Initial- flex-90, abd- 80, ir-60, er-50, strength- 4-/5    Exercises:  Exercise/Equipment Resistance/Repetitions Other comments   Nu step     isos X 10    Sl  er 2# x 30    Sl ranger X 2 min    tslide     bilat ext Green x 30    Ir/er     add Green x 30    ranger X 30                        HEP     scap retraction X 20     wall slide X 4 min    Ir/er X 30    Cane flex supine X 30                Other Therapeutic Activities:      Home Exercise Program:  Patient demonstrated proper technique, good tolerance,  and was given written instructions for the above exercises      Manual Treatments:  Prom all ranges, mfr to entire shoulder and scap, distraction gr 3, isos at end

## 2019-05-02 ENCOUNTER — HOSPITAL ENCOUNTER (EMERGENCY)
Age: 64
Discharge: HOME | End: 2019-05-02
Attending: EMERGENCY MEDICINE
Payer: COMMERCIAL

## 2019-05-02 VITALS
OXYGEN SATURATION: 99 % | HEIGHT: 69 IN | HEART RATE: 82 BPM | RESPIRATION RATE: 18 BRPM | BODY MASS INDEX: 37.91 KG/M2 | WEIGHT: 255.95 LBS | TEMPERATURE: 99.1 F | DIASTOLIC BLOOD PRESSURE: 86 MMHG | SYSTOLIC BLOOD PRESSURE: 177 MMHG

## 2019-05-02 DIAGNOSIS — M79.89 LEG SWELLING: Primary | ICD-10-CM

## 2019-05-02 LAB
ANION GAP SERPL CALCULATED.3IONS-SCNC: 10 MMOL/L (ref 3–16)
BASOPHILS ABSOLUTE: 0 K/UL (ref 0–0.2)
BASOPHILS RELATIVE PERCENT: 0.5 %
BUN BLDV-MCNC: 14 MG/DL (ref 7–20)
CALCIUM SERPL-MCNC: 9 MG/DL (ref 8.3–10.6)
CHLORIDE BLD-SCNC: 105 MMOL/L (ref 99–110)
CO2: 26 MMOL/L (ref 21–32)
CREAT SERPL-MCNC: 0.8 MG/DL (ref 0.8–1.3)
EOSINOPHILS ABSOLUTE: 0.2 K/UL (ref 0–0.6)
EOSINOPHILS RELATIVE PERCENT: 4.5 %
GFR AFRICAN AMERICAN: >60
GFR NON-AFRICAN AMERICAN: >60
GLUCOSE BLD-MCNC: 84 MG/DL (ref 70–99)
HCT VFR BLD CALC: 36 % (ref 40.5–52.5)
HEMOGLOBIN: 11.8 G/DL (ref 13.5–17.5)
LYMPHOCYTES ABSOLUTE: 1.3 K/UL (ref 1–5.1)
LYMPHOCYTES RELATIVE PERCENT: 35.6 %
MCH RBC QN AUTO: 32.7 PG (ref 26–34)
MCHC RBC AUTO-ENTMCNC: 32.7 G/DL (ref 31–36)
MCV RBC AUTO: 100.1 FL (ref 80–100)
MONOCYTES ABSOLUTE: 0.4 K/UL (ref 0–1.3)
MONOCYTES RELATIVE PERCENT: 9.4 %
NEUTROPHILS ABSOLUTE: 1.9 K/UL (ref 1.7–7.7)
NEUTROPHILS RELATIVE PERCENT: 50 %
PDW BLD-RTO: 13.1 % (ref 12.4–15.4)
PLATELET # BLD: 159 K/UL (ref 135–450)
PMV BLD AUTO: 8.5 FL (ref 5–10.5)
POTASSIUM REFLEX MAGNESIUM: 4.1 MMOL/L (ref 3.5–5.1)
PRO-BNP: 53 PG/ML (ref 0–124)
RBC # BLD: 3.6 M/UL (ref 4.2–5.9)
SODIUM BLD-SCNC: 141 MMOL/L (ref 136–145)
WBC # BLD: 3.8 K/UL (ref 4–11)

## 2019-05-02 PROCEDURE — 80048 BASIC METABOLIC PNL TOTAL CA: CPT

## 2019-05-02 PROCEDURE — 83880 ASSAY OF NATRIURETIC PEPTIDE: CPT

## 2019-05-02 PROCEDURE — 85025 COMPLETE CBC W/AUTO DIFF WBC: CPT

## 2019-05-02 PROCEDURE — 93005 ELECTROCARDIOGRAM TRACING: CPT | Performed by: EMERGENCY MEDICINE

## 2019-05-02 PROCEDURE — 99284 EMERGENCY DEPT VISIT MOD MDM: CPT

## 2019-05-02 RX ORDER — HYDROCHLOROTHIAZIDE 25 MG/1
12.5 TABLET ORAL DAILY
Qty: 7 TABLET | Refills: 0 | Status: SHIPPED | OUTPATIENT
Start: 2019-05-02 | End: 2019-07-24

## 2019-05-02 ASSESSMENT — PAIN DESCRIPTION - DESCRIPTORS: DESCRIPTORS: TENDER

## 2019-05-02 ASSESSMENT — ENCOUNTER SYMPTOMS
COLOR CHANGE: 0
SHORTNESS OF BREATH: 0
VOMITING: 0
ABDOMINAL PAIN: 0
BACK PAIN: 0
WHEEZING: 0
NAUSEA: 0
RHINORRHEA: 0
CHEST TIGHTNESS: 0
PHOTOPHOBIA: 0
COUGH: 0

## 2019-05-02 ASSESSMENT — PAIN DESCRIPTION - LOCATION: LOCATION: LEG

## 2019-05-02 ASSESSMENT — PAIN DESCRIPTION - FREQUENCY: FREQUENCY: INTERMITTENT

## 2019-05-02 ASSESSMENT — PAIN DESCRIPTION - ORIENTATION: ORIENTATION: RIGHT;LEFT

## 2019-05-02 ASSESSMENT — PAIN DESCRIPTION - PAIN TYPE: TYPE: ACUTE PAIN

## 2019-05-02 NOTE — ED PROVIDER NOTES
[address] [facility]  [facility]      Pt Name: Ruthann Jama  MRN: [id number]  [place] 1955  Date ofevaluation: 5/2/2019  Provider: Jovon Limon MD    CHIEF COMPLAINT       Chief Complaint   Patient presents with    Leg Swelling     bilateral leg swelling that began after toe nail removed last week         HISTORY OF PRESENT ILLNESS   (Location/Symptom, Timing/Onset,Context/Setting, Quality, Duration, Modifying Factors, Severity)  Note limiting factors. Ruthann Jama is a 70 y.o. male who presents to the emergency department for evaluation of bilateral lower extremity leg swelling. Patient states that over the last 3-4 days he's been having worsening swelling despite taking diuretics and using compression stockings. Patient states that today the swelling was to the point where he cannot use the stockings. Patient states he has not been attempting to keep his feet elevated. Patient denies shortness of breath chest pains orthopnea proximal nocturnal dyspnea or exertional dyspnea. Patient denies fevers or cough. On presentation to the emergency room and patient appears to be no acute distress. HPI    NursingNotes were reviewed. REVIEW OF SYSTEMS    (2-9 systems for level 4, 10 or more for level 5)     Review of Systems   Constitutional: Negative for activity change, chills and fever. HENT: Negative for congestion and rhinorrhea. Eyes: Negative for photophobia and visual disturbance. Respiratory: Negative for cough, chest tightness, shortness of breath and wheezing. Cardiovascular: Positive for leg swelling. Negative for chest pain and palpitations. Gastrointestinal: Negative for abdominal pain, nausea and vomiting. Endocrine: Negative for polydipsia and polyuria. Genitourinary: Negative for difficulty urinating and frequency. Musculoskeletal: Negative for back pain and gait problem.    Skin: Negative for color change, rash and wound. Neurological: Negative for light-headedness and headaches. Psychiatric/Behavioral: Negative for confusion. The patient is not nervous/anxious. Except as noted above the remainder of the review of systems was reviewed and negative. PAST MEDICAL HISTORY     Past Medical History:   Diagnosis Date    Arthritis     Diabetes mellitus ([address]     High blood pressure     Hyperlipidemia     Wears partial dentures          SURGICALHISTORY       Past Surgical History:   Procedure Laterality Date    APPENDECTOMY      BACK SURGERY  10/2007   1979  1980    CARPAL TUNNEL RELEASE Bilateral     HIP ARTHROPLASTY Left 04/04/2017    HIP SURGERY  04/2008    bursa removed    JOINT REPLACEMENT  2009, 2006, 2003, 1997    Left knee replacement    KNEE SURGERY Left     [id number] scopes         CURRENT MEDICATIONS       Previous Medications    AMITRIPTYLINE (ELAVIL) 75 MG TABLET    Take 75 mg by mouth nightly as needed     ATORVASTATIN (LIPITOR) 20 MG TABLET    Take 20 mg by mouth nightly     AUGMENTED BETAMETHASONE DIPROPIONATE (DIPROLENE-AF) 0.05 % CREAM    Apply thin layer topically to L ankle every third day    BACLOFEN (LIORESAL) 10 MG TABLET    Take 10 mg by mouth 3 times daily. GABAPENTIN (NEURONTIN) 600 MG TABLET    Take 600 mg by mouth 3 times daily. GABAPENTIN (NEURONTIN) 800 MG TABLET        LISINOPRIL-HYDROCHLOROTHIAZIDE (PRINZIDE;ZESTORETIC) 20-25 MG PER TABLET    Take 1 Tab by mouth daily. METFORMIN (GLUCOPHAGE) 500 MG TABLET    Take 500 mg by mouth 2 times daily (with meals) Takes only as needed    METHADONE (DOLOPHINE) 5 MG TABLET    Take 5 mg by mouth 3 times daily  . METOPROLOL (TOPROL-XL) 25 MG XL TABLET    Take 25 mg by mouth daily     MULTIPLE VITAMINS-MINERALS (CENTRUM SILVER PO)    Take  by mouth.     NABUMETONE (RELAFEN) 750 MG TABLET        OXYBUTYNIN (DITROPAN-XL) 10 MG EXTENDED RELEASE TABLET        [employer] SYSTEM TEST STP STRIP        RIVAROXABAN ([place]) 10 MG TABS TABLET    Take 1 tablet by mouth daily Begin day after discharge from hospital and continue for 30 total days    ZOLPIDEM (AMBIEN) 5 MG TABLET    Take 5 mg by mouth nightly as needed        ALLERGIES     Prochlorperazine and Prochlorperazine edisylate    FAMILY HISTORY       Family History   Problem Relation Age of Onset    Heart Disease Mother     High Blood Pressure Mother     Diabetes Mother     Heart Disease Father     High Blood Pressure Father     Diabetes Father           SOCIAL HISTORY       Social History     Socioeconomic History    Marital status:      Spouse name: None    Number of children: None    Years of education: None    Highest education level: None   Occupational History    None   Social Needs    Financial resource strain: None    Food insecurity:     Worry: None     Inability: None    Transportation needs:     Medical: None     Non-medical: None   Tobacco Use    Smoking status: Never Smoker    Smokeless tobacco: Never Used   Substance and Sexual Activity    Alcohol use: No    Drug use: No    Sexual activity: None   Lifestyle    Physical activity:     Days per week: None     Minutes per session: None    Stress: None   Relationships    Social connections:     Talks on phone: None     Gets together: None     Attends [employer] service: None     Active member of club or organization: None     Attends meetings of clubs or organizations: None     Relationship status: None    Intimate partner violence:     Fear of current or ex partner: None     Emotionally abused: None     Physically abused: None     Forced sexual activity: None   Other Topics Concern    None   Social History Narrative    None       SCREENINGS      @FLOW([id number])@      PHYSICAL EXAM    (up to 7 for level 4, 8 or more for level 5)     ED Triage Vitals [05/02/19 1859]   BP Temp Temp Source Pulse Resp SpO2 Height Weight   (!) 177/86 99.1 °F (37.3 °C) Oral 82 18 99 % 5' 9\" (1.753 m) 255 lb 15.3 oz (116.1 kg)       Physical Exam   Constitutional: He is oriented to person, place, and time. He appears well-developed and well-nourished. No distress. HENT:   Head: Normocephalic and atraumatic. Eyes: Conjunctivae and EOM are normal.   Neck: Normal range of motion. No tracheal deviation present. Cardiovascular: Normal rate and regular rhythm. Pulmonary/Chest: Effort normal and breath sounds normal. No respiratory distress. He has no wheezes. He has no rales. Abdominal: Soft. He exhibits no distension and no mass. There is no tenderness. There is no guarding and no CVA tenderness. Musculoskeletal: Normal range of motion. He exhibits edema. He exhibits no tenderness or deformity. Neurological: He is alert and oriented to person, place, and time. Skin: Skin is warm and dry. DIAGNOSTIC RESULTS     EKG: All EKG's are interpreted by the Emergency Department Physician who either signs or Co-signsthis chart in the absence of a cardiologist.    Agents EKG shows a sinus rhythm with a ventricular rate of 60 bpm.  Patient has a prolonged TN interval and QTC intervals within except we'll range. The patient has normal axis. There are no secondary ST elevations or depressions and EKG is nondiagnostic for ACS. EKG appears similar to previous EKG other is a sinus arrhythmia    RADIOLOGY:   Non-plain filmimages such as CT, Ultrasound and MRI are read by the radiologist. Plain radiographic images are visualized and preliminarily interpreted by the emergency physician with the below findings:      Interpretation per the Radiologist below, if available at the time ofthis note:    No orders to display         ED BEDSIDE ULTRASOUND:   Performed by ED Physician - none    LABS:  Labs Reviewed   CBC WITH AUTO DIFFERENTIAL   BRAIN NATRIURETIC PEPTIDE   BASIC METABOLIC PANEL W/ REFLEX TO MG FOR LOW K       All other labs were within normal range or not returned as of this dictation.     EMERGENCY DEPARTMENT COURSE and DIFFERENTIAL DIAGNOSIS/MDM:   Vitals:    Vitals:    05/02/19 1859   BP: (!) 177/86   Pulse: 82   Resp: 18   Temp: 99.1 °F (37.3 °C)   TempSrc: Oral   SpO2: 99%   Weight: 255 lb 15.3 oz (116.1 kg)   Height: 5' 9\" (1.753 m)         MDM  Number of Diagnoses or Management Options  Diagnosis management comments: 61-year-old male presents the ED for evaluation of bilateral lower extremity swelling. On exam the patient does have bilateral pain edema up to the midcalf. Patient has palpable pulses, and motor function is intact in the ankle and foot. Refill within normal limits. Patient's lungs clear to auscultation the patient has no respiratory symptoms. Reviewing the patient's medical record he does have an echocardiogram from 2012 which is an EF of 65%. Patient states he is taking diuretics and use compression stockings which is suspicious that the patient has underlying history of venous insufficiency. Differential diagnosis also includes congestive heart failure, lymphedema, and soft tissue infection. There is no warmth overlying erythema and have low clinical suspicion for infection. Denies calf pain recent travel or history of DVT or PE at a local suspicion for bilateral DVT. REASSESSMENT      On reevaluation the patient is resting comfortably and has no complaints. Laboratory workup was unremarkable there is no leukocytosis and BNP was elevated. I believe the patient's symptoms are likely secondary to venous stasis and he will be given a short course of an increased dose of hydrocortisone as instructed to wrap his legs and keep them elevated while resting. Patient amenable discharge home with outpatient follow-up. Results were discussed with the patient and why it was of the opinion that the patient was suitable for discharge. Patient is amenable to discharge home. Return indications discussed with the patient.   Patient demonstrates understanding of when to return for reevaluation for persistent

## 2019-05-03 ENCOUNTER — HOSPITAL ENCOUNTER (OUTPATIENT)
Dept: PHYSICAL THERAPY | Age: 64
Setting detail: THERAPIES SERIES
Discharge: HOME | End: 2019-05-03
Payer: COMMERCIAL

## 2019-05-03 LAB
EKG ATRIAL RATE: 60 BPM
EKG DIAGNOSIS: NORMAL
EKG P AXIS: 31 DEGREES
EKG P-R INTERVAL: 196 MS
EKG Q-T INTERVAL: 412 MS
EKG QRS DURATION: 80 MS
EKG QTC CALCULATION (BAZETT): 412 MS
EKG R AXIS: 24 DEGREES
EKG T AXIS: 25 DEGREES
EKG VENTRICULAR RATE: 60 BPM

## 2019-05-03 PROCEDURE — 93010 ELECTROCARDIOGRAM REPORT: CPT | Performed by: INTERNAL MEDICINE

## 2019-05-03 NOTE — FLOWSHEET NOTE
Physical Therapy  Cancellation/No-show Note  Patient Name:  Ninfa Benz  :  1955   Date:  5/3/2019  Cancelled visits to date:1  No-shows to date: 0    For today's appointment patient:  [x]  Cancelled  []  Rescheduled appointment  []  No-show     Reason given by patient:  []  Patient ill  []  Conflicting appointment  []  No transportation    []  Conflict with work  []  No reason given  []  Other:     Comments:      Electronically signed by:  González Campbell PT

## 2019-05-06 ENCOUNTER — OFFICE VISIT (OUTPATIENT)
Dept: ORTHOPEDIC SURGERY | Age: 64
End: 2019-05-06
Payer: COMMERCIAL

## 2019-05-06 VITALS
DIASTOLIC BLOOD PRESSURE: 88 MMHG | BODY MASS INDEX: 37.91 KG/M2 | HEART RATE: 65 BPM | TEMPERATURE: 98.2 F | HEIGHT: 69 IN | WEIGHT: 255.95 LBS | SYSTOLIC BLOOD PRESSURE: 177 MMHG | RESPIRATION RATE: 16 BRPM

## 2019-05-06 DIAGNOSIS — M75.112 INCOMPLETE TEAR OF LEFT ROTATOR CUFF: Primary | ICD-10-CM

## 2019-05-06 PROCEDURE — 1036F TOBACCO NON-USER: CPT | Performed by: PHYSICIAN ASSISTANT

## 2019-05-06 PROCEDURE — 20610 DRAIN/INJ JOINT/BURSA W/O US: CPT | Performed by: PHYSICIAN ASSISTANT

## 2019-05-06 PROCEDURE — 99212 OFFICE O/P EST SF 10 MIN: CPT | Performed by: PHYSICIAN ASSISTANT

## 2019-05-06 PROCEDURE — 3017F COLORECTAL CA SCREEN DOC REV: CPT | Performed by: PHYSICIAN ASSISTANT

## 2019-05-06 PROCEDURE — 1123F ACP DISCUSS/DSCN MKR DOCD: CPT | Performed by: PHYSICIAN ASSISTANT

## 2019-05-06 PROCEDURE — G8427 DOCREV CUR MEDS BY ELIG CLIN: HCPCS | Performed by: PHYSICIAN ASSISTANT

## 2019-05-06 PROCEDURE — 4040F PNEUMOC VAC/ADMIN/RCVD: CPT | Performed by: PHYSICIAN ASSISTANT

## 2019-05-06 PROCEDURE — G8417 CALC BMI ABV UP PARAM F/U: HCPCS | Performed by: PHYSICIAN ASSISTANT

## 2019-05-06 NOTE — PROGRESS NOTES
Subjective:      Patient ID: Brijesh Triana is a 70 y.o.  male. Chief Complaint   Patient presents with    Follow-up     L shoulder/ last injection 2.11.19        HPI: He is here for follow-up on his left shoulder. MRI positive for partial cuff tear. No better with PT. Temporary relief with prior subacromial injection. Pain today is 7/10. Review of Systems:   Negative for fever or chills. Negative for neck pain, numbness or tingling into the upper extremities. Past Medical History:   Diagnosis Date    Arthritis     Diabetes mellitus ([address]     High blood pressure     Hyperlipidemia     Wears partial dentures        Family History   Problem Relation Age of Onset    Heart Disease Mother     High Blood Pressure Mother     Diabetes Mother     Heart Disease Father     High Blood Pressure Father     Diabetes Father        Past Surgical History:   Procedure Laterality Date    APPENDECTOMY      BACK SURGERY  10/2007   1979  1980    CARPAL TUNNEL RELEASE Bilateral     HIP ARTHROPLASTY Left 04/04/2017    HIP SURGERY  04/2008    bursa removed    JOINT REPLACEMENT  2009, 2006, 2003, 1997    Left knee replacement    KNEE SURGERY Left     [id number] scopes       Social History     Occupational History    Not on file   Tobacco Use    Smoking status: Never Smoker    Smokeless tobacco: Never Used   Substance and Sexual Activity    Alcohol use: No    Drug use: No    Sexual activity: Not on file       Current Outpatient Medications   Medication Sig Dispense Refill    hydrochlorothiazide (HYDRODIURIL) 25 MG tablet Take 0.5 tablets by mouth daily 7 tablet 0    gabapentin (NEURONTIN) 800 MG tablet Take 800 mg by mouth 3 times daily.        nabumetone (RELAFEN) 750 MG tablet Take 750 mg by mouth 2 times daily       oxybutynin (DITROPAN-XL) 10 MG extended release tablet       amitriptyline (ELAVIL) 75 MG tablet Take 75 mg by mouth nightly as needed       augmented betamethasone dipropionate (DIPROLENE-AF) 0.05 % cream Apply thin layer topically to L ankle every third day 45 g 1    metformin (GLUCOPHAGE) 500 MG tablet Take 500 mg by mouth 2 times daily (with meals) Takes only as needed      metoprolol (TOPROL-XL) 25 MG XL tablet Take 25 mg by mouth daily       zolpidem (AMBIEN) 5 MG tablet Take 5 mg by mouth nightly as needed       baclofen (LIORESAL) 10 MG tablet Take 10 mg by mouth 3 times daily.  methadone (DOLOPHINE) 5 MG tablet Take 5 mg by mouth 2 times daily.  PRESTIGE SMART SYSTEM TEST STP strip       Multiple Vitamins-Minerals (CENTRUM SILVER PO) Take  by mouth.  lisinopril-hydrochlorothiazide (PRINZIDE;ZESTORETIC) 20-25 MG per tablet Take 1 Tab by mouth daily.  atorvastatin (LIPITOR) 20 MG tablet Take 20 mg by mouth nightly        No current facility-administered medications for this visit. Objective:   He is alert, oriented x 3, pleasant, well nourished, developed and in no acute distress. BP (!) 177/88   Pulse 65   Temp 98.2 °F (36.8 °C) (Tympanic)   Resp 16   Ht 5' 9\" (1.753 m)   Wt 255 lb 15.2 oz (116.1 kg)   BMI 37.80 kg/m²        Examination of the left shoulder shows: There is no deformity. There is no erythema. There is no  soft tissue swelling. Deltoid region is  tender to palpation. AC Joint is not tender to palpation. Clavicle is not tender to palpation. Bicipital Groove is not  tender to palpation. Pectoralis  is not tender to palpation. Scapula/ trapezius is not tender to palpation. There is mild weakness with supraspinatus testing. There is moderate pain with supraspinatus testing. Yergason Test negative. Drop Arm Test negative. Apprehension Test negative. Cross Arm Test negative. Shoulder ROM-     Near full AROM without instability. X Rays: not performed in the office today:     Diagnosis:        ICD-10-CM    1.  Incomplete tear of left rotator cuff M75.112 [place] ARTHROCENTESIS ASPIR&/INJ MAJOR JT/BURSA W/O US     [place]

## 2019-05-07 ENCOUNTER — HOSPITAL ENCOUNTER (OUTPATIENT)
Dept: PHYSICAL THERAPY | Age: 64
Setting detail: THERAPIES SERIES
Discharge: HOME | End: 2019-05-07
Payer: COMMERCIAL

## 2019-05-09 ENCOUNTER — HOSPITAL ENCOUNTER (OUTPATIENT)
Dept: PHYSICAL THERAPY | Age: 64
Setting detail: THERAPIES SERIES
Discharge: HOME | End: 2019-05-09
Payer: COMMERCIAL

## 2019-05-09 NOTE — FLOWSHEET NOTE
Physical Therapy  Cancellation/No-show Note  Patient Name:  Kieth Galeazzi  :  1955   Date:  2019  Cancelled visits to date: 2    cx per md, dc pt  No-shows to date: 0    For today's appointment patient:  [x]  Cancelled  []  Rescheduled appointment  []  No-show     Reason given by patient:  []  Patient ill  []  Conflicting appointment  []  No transportation    []  Conflict with work  []  No reason given  [x]  Other:     Comments: had injection today.     Electronically signed by:  Marcello Jackson PT

## 2019-05-09 NOTE — DISCHARGE SUMMARY
Outpatient Physical Therapy  [] [facility]    Phone: [phone]   Fax: [phone]   [x] [facility]  Phone: [phone]   Fax: [phone]  [] [facility]              Phone: [phone]   Fax: [phone]     Physical Therapy Progress/Discharge Note  Date: 2019        Patient Name:  Brigid Cortes    :  1955  MRN: [id number]  Restrictions/Precautions:    Pertinent Medical History:  Medical/Treatment Diagnosis Information:  · Diagnosis: RTC Tear Arthroplasty, left shoulder  · Treatment Diagnosis: decreased abilty to use his left ue for adl's  Insurance/Certification information:  PT Insurance Information: medicare  Physician Information:  Referring Practitioner: Shanell Ariascel of care signed (Y/N):  routed  Visit# / total visits:   Pain level:      6/10                         Plan of Care/Treatment to date:  [x] Therapeutic Exercise    [] Modalities:  [x] Therapeutic Activity     [] Ultrasound  [x] Electrical Stimulation  [x] Gait Training      [] Cervical Traction    [] Lumbar Traction  [x] Neuromuscular Re-education  [x] Cold/hotpack [] Iontophoresis  [x] Instruction in HEP      Other:  [x] Manual Therapy       []    [] Aquatic Therapy       []                    ? Significant Findings At Last Visit/Comments:    Subjective:Pt states, \" MD told me I could just do exs at home \"          Objective:  rom- flex- 110, abd- 100, ir-60, er- 60  Strength- 4/5      Assessment:  Pt was seen for 2 rx with some improvement. The md then told him he could just do hep. DCPT  Goalas partially met    Current Frequency/Duration:  # Days per week: [] 1 day # Weeks: [] 1 week [] 4 weeks      [x] 2 days?    [] 2 weeks [] 5 weeks      [] 3 days   [] 3 weeks [] 6 weeks     Rehab Potential: [] Excellent [] Good [x] Fair  [] Poor     Goal Status:  [] Achieved [x] Partially Achieved  [] Not Achieved     Patient Status: [] Continue per initial plan of Care     [x] Patient now discharged     [] Additional

## 2019-05-17 ENCOUNTER — HOSPITAL ENCOUNTER (OUTPATIENT)
Dept: VASCULAR LAB | Age: 64
Discharge: HOME | End: 2019-05-17
Payer: COMMERCIAL

## 2019-05-17 PROCEDURE — 93970 EXTREMITY STUDY: CPT

## 2019-05-22 ENCOUNTER — APPOINTMENT (OUTPATIENT)
Dept: CT IMAGING | Age: 64
End: 2019-05-22
Payer: COMMERCIAL

## 2019-05-22 PROCEDURE — 70450 CT HEAD/BRAIN W/O DYE: CPT

## 2019-05-22 ASSESSMENT — PAIN DESCRIPTION - LOCATION: LOCATION: HEAD

## 2019-05-22 ASSESSMENT — PAIN DESCRIPTION - DESCRIPTORS: DESCRIPTORS: DULL;THROBBING

## 2019-05-22 ASSESSMENT — PAIN DESCRIPTION - ORIENTATION: ORIENTATION: POSTERIOR

## 2019-05-22 ASSESSMENT — PAIN SCALES - GENERAL: PAINLEVEL_OUTOF10: 6

## 2019-05-22 ASSESSMENT — PAIN DESCRIPTION - FREQUENCY: FREQUENCY: CONTINUOUS

## 2019-05-22 ASSESSMENT — PAIN SCALES - WONG BAKER: WONGBAKER_NUMERICALRESPONSE: 6

## 2019-05-23 ENCOUNTER — HOSPITAL ENCOUNTER (EMERGENCY)
Age: 64
Discharge: HOME | End: 2019-05-23
Payer: COMMERCIAL

## 2019-05-23 VITALS
TEMPERATURE: 98.6 F | DIASTOLIC BLOOD PRESSURE: 71 MMHG | SYSTOLIC BLOOD PRESSURE: 154 MMHG | HEART RATE: 71 BPM | OXYGEN SATURATION: 99 % | RESPIRATION RATE: 17 BRPM

## 2019-05-23 DIAGNOSIS — S09.90XA CLOSED HEAD INJURY, INITIAL ENCOUNTER: Primary | ICD-10-CM

## 2019-05-23 PROCEDURE — 99283 EMERGENCY DEPT VISIT LOW MDM: CPT

## 2019-05-23 PROCEDURE — 6370000000 HC RX 637 (ALT 250 FOR IP): Performed by: NURSE PRACTITIONER

## 2019-05-23 RX ORDER — OXYCODONE HYDROCHLORIDE AND ACETAMINOPHEN 5; 325 MG/1; MG/1
1 TABLET ORAL EVERY 8 HOURS PRN
COMMUNITY

## 2019-05-23 RX ORDER — ACETAMINOPHEN 500 MG
1000 TABLET ORAL ONCE
Status: COMPLETED | OUTPATIENT
Start: 2019-05-23 | End: 2019-05-23

## 2019-05-23 RX ADMIN — ACETAMINOPHEN 1000 MG: 500 TABLET ORAL at 01:49

## 2019-05-23 ASSESSMENT — PAIN SCALES - GENERAL
PAINLEVEL_OUTOF10: 5
PAINLEVEL_OUTOF10: 6

## 2019-05-23 ASSESSMENT — PAIN DESCRIPTION - ONSET: ONSET: ON-GOING

## 2019-05-23 ASSESSMENT — PAIN DESCRIPTION - PAIN TYPE: TYPE: ACUTE PAIN

## 2019-05-23 ASSESSMENT — PAIN - FUNCTIONAL ASSESSMENT
PAIN_FUNCTIONAL_ASSESSMENT: PREVENTS OR INTERFERES SOME ACTIVE ACTIVITIES AND ADLS
PAIN_FUNCTIONAL_ASSESSMENT: 0-10

## 2019-05-23 ASSESSMENT — PAIN DESCRIPTION - LOCATION: LOCATION: BACK;HEAD

## 2019-05-23 ASSESSMENT — PAIN DESCRIPTION - FREQUENCY: FREQUENCY: CONTINUOUS

## 2019-05-23 ASSESSMENT — PAIN DESCRIPTION - PROGRESSION: CLINICAL_PROGRESSION: GRADUALLY IMPROVING

## 2019-05-23 ASSESSMENT — PAIN DESCRIPTION - DESCRIPTORS: DESCRIPTORS: ACHING;TENDER

## 2019-05-23 NOTE — ED PROVIDER NOTES
[address]  [address] [facility] [address]  Dept: [phone]  Loc: [phone]    eMERGENCY dEPARTMENT eNCOUnter    Evaluated by the Advanced Practice Provider    [facility]    Chief Complaint   Patient presents with    Head Injury     hit back of head, pt fell from a standing position, no LOC       HPI    Bernadette Mcghee is a 70 y.o. male who presents with a head injury. Onset was earlier this evening. The context was the patient was mowing his grass with a push mower when he was backing up tripped over an elevated sidewalk and hit his head. The patient complains of a headache with severity of 6/10. The patient denies associated loss of consciousness. The patient denies vomiting after the injury. The patient denies associated neck pain. No aggravating or alleviating factors. REVIEW OF SYSTEMS    Neurologic: see HPI, No extremity weakness, no LOC  Musculoskeletal: No arthralgias or myalgias. No neck pain or stiffness   Cardiac: No chest pain or chest injury  Respiratory: No difficulty breathing  GI: No abdominal pain or abdominal injury  : No dysuria or hematuria  General: No fever  All other systems reviewed and are negative.      PAST MEDICAL & SURGICAL HISTORY    Past Medical History:   Diagnosis Date    Arthritis     Diabetes mellitus ([address]     High blood pressure     Hyperlipidemia     Wears partial dentures      Past Surgical History:   Procedure Laterality Date    APPENDECTOMY      BACK SURGERY  10/2007   1979  1980    CARPAL TUNNEL RELEASE Bilateral     HIP ARTHROPLASTY Left 04/04/2017    HIP SURGERY  04/2008    bursa removed    JOINT REPLACEMENT  2009, 2006, 2003, 1997    Left knee replacement    KNEE SURGERY Left     [id number] scopes       CURRENT MEDICATIONS  (may include discharge medications prescribed in the ED)  Current Outpatient Rx   Medication Sig Dispense Refill    oxyCODONE-acetaminophen (PERCOCET) 5-325 MG per tablet Take 1 tablet by mouth every 8 hours as needed for Pain.  gabapentin (NEURONTIN) 800 MG tablet Take 800 mg by mouth 3 times daily.  nabumetone (RELAFEN) 750 MG tablet Take 750 mg by mouth 2 times daily       amitriptyline (ELAVIL) 75 MG tablet Take 75 mg by mouth nightly as needed       metformin (GLUCOPHAGE) 500 MG tablet Take 500 mg by mouth 2 times daily (with meals) Takes only as needed      metoprolol (TOPROL-XL) 25 MG XL tablet Take 25 mg by mouth daily       baclofen (LIORESAL) 10 MG tablet Take 10 mg by mouth 3 times daily.  methadone (DOLOPHINE) 5 MG tablet Take 5 mg by mouth 2 times daily.  lisinopril-hydrochlorothiazide (PRINZIDE;ZESTORETIC) 20-25 MG per tablet Take 1 Tab by mouth daily.  atorvastatin (LIPITOR) 20 MG tablet Take 20 mg by mouth nightly       hydrochlorothiazide (HYDRODIURIL) 25 MG tablet Take 0.5 tablets by mouth daily 7 tablet 0    oxybutynin (DITROPAN-XL) 10 MG extended release tablet       augmented betamethasone dipropionate (DIPROLENE-AF) 0.05 % cream Apply thin layer topically to L ankle every third day 45 g 1    zolpidem (AMBIEN) 5 MG tablet Take 5 mg by mouth nightly as needed       PRESTIGE M2M Solution SYSTEM TEST STP strip       Multiple Vitamins-Minerals (CENTRUM SILVER PO) Take  by mouth.          ALLERGIES    Allergies   Allergen Reactions    Prochlorperazine Swelling    Prochlorperazine Edisylate      Tongue swells       SOCIAL & FAMILY HISTORY    Social History     Socioeconomic History    Marital status:      Spouse name: None    Number of children: None    Years of education: None    Highest education level: None   Occupational History    None   Social Needs    Financial resource strain: None    Food insecurity:     Worry: None     Inability: None    Transportation needs:     Medical: None     Non-medical: None   Tobacco Use    Smoking status: Never Smoker    Smokeless tobacco: Never Used   Substance and Sexual Activity    Alcohol use: No    Drug use: No    Sexual activity: None   Lifestyle    Physical activity:     Days per week: None     Minutes per session: None    Stress: None   Relationships    Social connections:     Talks on phone: None     Gets together: None     Attends Gnosticist service: None     Active member of club or organization: None     Attends meetings of clubs or organizations: None     Relationship status: None    Intimate partner violence:     Fear of current or ex partner: None     Emotionally abused: None     Physically abused: None     Forced sexual activity: None   Other Topics Concern    None   Social History Narrative    None     Family History   Problem Relation Age of Onset    Heart Disease Mother     High Blood Pressure Mother     Diabetes Mother     Heart Disease Father     High Blood Pressure Father     Diabetes Father        PHYSICAL EXAM    VITAL SIGNS: BP (!) 165/81   Pulse 76   Temp 98.6 °F (37 °C) (Oral)   Resp 16   SpO2 98%    Constitutional:  Well developed, well nourished, no acute distress   Scalp: no scalp hematoma, no palpable skull fractures  Neck: no posterior midline neck tenderness, no JVD   Eyes: Pupils equally round and react to light, extraocular movement are intact  HENT:  No trismus, airway patent, no hemotympanum  Respiratory:  Lungs clear to auscultation bilaterally, no retractions   Cardiovascular:  regular rate, no murmurs  GI:  Soft, nontender, normal bowel sounds  Musculoskeletal:  Full ROM, no edema, no acute deformities  Vascular: Radial and DP pulses 2+ and equal bilaterally  Integument:  Well hydrated, no scalp laceration  Neurologic:  Awake, alert, oriented x4, no aphasia, no slurred speech, CN II-XII intact, normal finger to nose test bilaterally, 5/5 strength in all 4 extremities, sensation to light touch intact bilaterally, patellar reflexes 2+ and equal bilaterally  Psych: Pleasant affect, no hallucinations    RADIOLOGY    CT HEAD WO CONTRAST   Final Result   No acute intracranial abnormality. PROCEDURE      ED COURSE & MEDICAL DECISION MAKING    Pertinent Labs & Imaging studies reviewed and interpreted. (See chart for details)    Differential Diagnosis: epidural hematoma, subdural hematoma, parenchymal brain contusion or bleed, subarachnoid hemorrhage, skull fracture, neck fracture or dislocation, other. Patient seen and examined today for head injury. See HPI for patient presentation. Patient is hemodynamically stable, nontoxic, afebrile, and without tachycardia, tachypnea, and hypoxia. Physical exam as above. Well-appearing 59-year-old male lying in bed in no acute distress. He is awake alert and oriented and neurovascularly intact without deficits. CT the head shows no acute intracranial abnormality. I do not feel further workup is warranted. No neck pain or stiffness. I feel he is appropriate and safe for discharge home. At this time, the evidence for any other entities in the differential is insufficient to justify any further testing. This was explained to the patient. The patient was advised that persistent or worsening symptoms will require further evaluation. The patient tolerated their visit well. I saw the patient independently with physician available for consultation as needed. The patient and / or the family were informed of the results of any tests, a time was given to answer questions, a plan was proposed and they agreed with plan. FINAL IMPRESSION    1.  Closed head injury, initial encounter        PLAN   Discharge with outpatient follow-up (see EMR)    (Please note that this note was completed with a voice recognition program.  Every attempt was made to edit the dictations, but inevitably there remain words that are mis-transcribed.)        RHYS Garcia - LAZARO  05/23/19 [id number]

## 2019-06-06 ENCOUNTER — HOSPITAL ENCOUNTER (EMERGENCY)
Age: 64
Discharge: HOME | End: 2019-06-06
Attending: EMERGENCY MEDICINE
Payer: COMMERCIAL

## 2019-06-06 ENCOUNTER — APPOINTMENT (OUTPATIENT)
Dept: CT IMAGING | Age: 64
End: 2019-06-06
Payer: COMMERCIAL

## 2019-06-06 VITALS
RESPIRATION RATE: 18 BRPM | TEMPERATURE: 98.2 F | BODY MASS INDEX: 33.76 KG/M2 | HEART RATE: 80 BPM | DIASTOLIC BLOOD PRESSURE: 74 MMHG | OXYGEN SATURATION: 100 % | WEIGHT: 228.62 LBS | SYSTOLIC BLOOD PRESSURE: 140 MMHG

## 2019-06-06 DIAGNOSIS — G44.309 POST-CONCUSSION HEADACHE: Primary | ICD-10-CM

## 2019-06-06 LAB
ANION GAP SERPL CALCULATED.3IONS-SCNC: 16 MMOL/L (ref 3–16)
BASOPHILS ABSOLUTE: 0 K/UL (ref 0–0.2)
BASOPHILS RELATIVE PERCENT: 0.6 %
BILIRUBIN URINE: NEGATIVE
BLOOD, URINE: NEGATIVE
BUN BLDV-MCNC: 18 MG/DL (ref 7–20)
CALCIUM SERPL-MCNC: 9.7 MG/DL (ref 8.3–10.6)
CHLORIDE BLD-SCNC: 96 MMOL/L (ref 99–110)
CLARITY: CLEAR
CO2: 30 MMOL/L (ref 21–32)
COLOR: YELLOW
CREAT SERPL-MCNC: 0.9 MG/DL (ref 0.8–1.3)
EOSINOPHILS ABSOLUTE: 0.1 K/UL (ref 0–0.6)
EOSINOPHILS RELATIVE PERCENT: 2.7 %
GFR AFRICAN AMERICAN: >60
GFR NON-AFRICAN AMERICAN: >60
GLUCOSE BLD-MCNC: 175 MG/DL (ref 70–99)
GLUCOSE URINE: NEGATIVE MG/DL
HCT VFR BLD CALC: 41.8 % (ref 40.5–52.5)
HEMOGLOBIN: 14 G/DL (ref 13.5–17.5)
KETONES, URINE: NEGATIVE MG/DL
LEUKOCYTE ESTERASE, URINE: NEGATIVE
LYMPHOCYTES ABSOLUTE: 1.4 K/UL (ref 1–5.1)
LYMPHOCYTES RELATIVE PERCENT: 28.2 %
MAGNESIUM: 1.9 MG/DL (ref 1.8–2.4)
MCH RBC QN AUTO: 32.6 PG (ref 26–34)
MCHC RBC AUTO-ENTMCNC: 33.4 G/DL (ref 31–36)
MCV RBC AUTO: 97.4 FL (ref 80–100)
MICROSCOPIC EXAMINATION: NORMAL
MONOCYTES ABSOLUTE: 0.3 K/UL (ref 0–1.3)
MONOCYTES RELATIVE PERCENT: 6.3 %
NEUTROPHILS ABSOLUTE: 3 K/UL (ref 1.7–7.7)
NEUTROPHILS RELATIVE PERCENT: 62.2 %
NITRITE, URINE: NEGATIVE
PDW BLD-RTO: 13.3 % (ref 12.4–15.4)
PH UA: 5 (ref 5–8)
PLATELET # BLD: 163 K/UL (ref 135–450)
PMV BLD AUTO: 8.9 FL (ref 5–10.5)
POTASSIUM REFLEX MAGNESIUM: 3.5 MMOL/L (ref 3.5–5.1)
PROTEIN UA: NEGATIVE MG/DL
RBC # BLD: 4.29 M/UL (ref 4.2–5.9)
SODIUM BLD-SCNC: 142 MMOL/L (ref 136–145)
SPECIFIC GRAVITY UA: 1.01 (ref 1–1.03)
URINE REFLEX TO CULTURE: NORMAL
URINE TYPE: NORMAL
UROBILINOGEN, URINE: 0.2 E.U./DL
WBC # BLD: 4.8 K/UL (ref 4–11)

## 2019-06-06 PROCEDURE — 80048 BASIC METABOLIC PNL TOTAL CA: CPT

## 2019-06-06 PROCEDURE — 83735 ASSAY OF MAGNESIUM: CPT

## 2019-06-06 PROCEDURE — 72125 CT NECK SPINE W/O DYE: CPT

## 2019-06-06 PROCEDURE — 85025 COMPLETE CBC W/AUTO DIFF WBC: CPT

## 2019-06-06 PROCEDURE — 99284 EMERGENCY DEPT VISIT MOD MDM: CPT

## 2019-06-06 PROCEDURE — 70450 CT HEAD/BRAIN W/O DYE: CPT

## 2019-06-06 PROCEDURE — 81003 URINALYSIS AUTO W/O SCOPE: CPT

## 2019-06-06 RX ORDER — BUTALBITAL, ACETAMINOPHEN AND CAFFEINE 300; 40; 50 MG/1; MG/1; MG/1
1 CAPSULE ORAL EVERY 6 HOURS PRN
Qty: 15 CAPSULE | Refills: 0 | Status: ON HOLD | OUTPATIENT
Start: 2019-06-06 | End: 2022-08-05

## 2019-06-06 ASSESSMENT — PAIN SCALES - GENERAL
PAINLEVEL_OUTOF10: 5
PAINLEVEL_OUTOF10: 2

## 2019-06-06 ASSESSMENT — PAIN DESCRIPTION - ONSET
ONSET: PROGRESSIVE
ONSET: PROGRESSIVE

## 2019-06-06 ASSESSMENT — PAIN DESCRIPTION - LOCATION
LOCATION: HEAD
LOCATION: HEAD

## 2019-06-06 ASSESSMENT — PAIN DESCRIPTION - FREQUENCY
FREQUENCY: CONTINUOUS
FREQUENCY: CONTINUOUS

## 2019-06-06 ASSESSMENT — PAIN DESCRIPTION - PROGRESSION
CLINICAL_PROGRESSION: GRADUALLY IMPROVING
CLINICAL_PROGRESSION: GRADUALLY WORSENING

## 2019-06-06 ASSESSMENT — PAIN DESCRIPTION - PAIN TYPE
TYPE: ACUTE PAIN
TYPE: ACUTE PAIN

## 2019-06-06 ASSESSMENT — PAIN - FUNCTIONAL ASSESSMENT
PAIN_FUNCTIONAL_ASSESSMENT: ACTIVITIES ARE NOT PREVENTED
PAIN_FUNCTIONAL_ASSESSMENT: ACTIVITIES ARE NOT PREVENTED

## 2019-06-06 ASSESSMENT — PAIN DESCRIPTION - DESCRIPTORS
DESCRIPTORS: CONSTANT;THROBBING
DESCRIPTORS: THROBBING

## 2019-06-06 NOTE — ED NOTES
Pt resting in bed at this time. Call light remains in reach no distress noted. RR even and unlabored, skin warm and dry. No needs at this time. Will continue to monitor closely.          Mino Waldron RN  06/06/19 [id number]

## 2019-06-06 NOTE — ED PROVIDER NOTES
[facility]  eMERGENCY dEPARTMENT eNCOUnter      Pt Name: Kieth Galeazzi  MRN: [id number]  Marilu 1955  Date of evaluation: 6/6/2019  Provider: Jalen Ross DO    CHIEF COMPLAINT       Chief Complaint   Patient presents with    Headache     Patient has had headaches for the last few weeks, that has progressively got worse         HISTORY OF PRESENT ILLNESS   (Location/Symptom, Timing/Onset, Context/Setting, Quality, Duration, Modifying Factors, Severity)  Note limiting factors. Kieth Galeazzi is a 70 y.o. male who presents to the emergency department with a complaint of a persistent sharp throbbing headache behind his eyes for the last 2 weeks. The patient had a fall on May 22, reporting that he tripped over an uneven sidewalk, and fell forward striking his head. There was no loss of consciousness. No associated nausea or vomiting. He was seen in the emergency department that time and had a negative CT head. He reports the headache is never completely gone away since the fall. He does report some mild neck discomfort with turning his head. He denies any weakness or numbness. He denies any other injury. He also reports that he has had some frequent urination today with several trips to the bathroom with large urine output that is clear. He denies any dysuria hematuria. No fever or chills. No abdominal pain or flank pain. No testicular pain. He is diabetic. He last checked his blood sugar last evening which was 105. No recent change in medications. Patient also complains of a raspy voice which has been a recurrent problem over the last few months. He has seen his primary care physician for this problem and has been referred to ENT. He has an appointment to be seen on Monday, [address] ENT evaluation. He denies any current sore throat or neck pain. HPI    Nursing Notes were reviewed.     REVIEW OF SYSTEMS    (2-9 systems for level 4, 10 or more for level 5)       Constitutional: Negative for fever or chills. HENT: Negative for rhinorrhea and sore throat. Eyes: Negative for redness or drainage. Respiratory: Negative for shortness of breath or dyspnea on exertion. Cardiovascular: Negative for chest pain. Gastrointestinal: Negative for abdominal pain. Negative for vomiting or diarrhea. Genitourinary: Negative for flank pain. Negative for dysuria. Negative for hematuria. Musculoskeletal:  Negative edema. Hematological: Negative for adenopathy. Psychiatric/Behavioral: Negative for confusion. All systems are reviewed and are negative except for those listed above in the history of present illness and ROS. PAST MEDICAL HISTORY     Past Medical History:   Diagnosis Date    Arthritis     Diabetes mellitus ([address]     High blood pressure     Hyperlipidemia     Wears partial dentures          SURGICAL HISTORY       Past Surgical History:   Procedure Laterality Date    APPENDECTOMY      BACK SURGERY  10/2007   1979  1980    CARPAL TUNNEL RELEASE Bilateral     HIP ARTHROPLASTY Left 04/04/2017    HIP SURGERY  04/2008    bursa removed    JOINT REPLACEMENT  2009, 2006, 2003, 1997    Left knee replacement    KNEE SURGERY Left     [id number] scopes         CURRENT MEDICATIONS       Previous Medications    AMITRIPTYLINE (ELAVIL) 75 MG TABLET    Take 75 mg by mouth nightly as needed     ATORVASTATIN (LIPITOR) 20 MG TABLET    Take 20 mg by mouth nightly     AUGMENTED BETAMETHASONE DIPROPIONATE (DIPROLENE-AF) 0.05 % CREAM    Apply thin layer topically to L ankle every third day    BACLOFEN (LIORESAL) 10 MG TABLET    Take 10 mg by mouth 3 times daily. GABAPENTIN (NEURONTIN) 800 MG TABLET    Take 800 mg by mouth 3 times daily. HYDROCHLOROTHIAZIDE (HYDRODIURIL) 25 MG TABLET    Take 0.5 tablets by mouth daily    LISINOPRIL-HYDROCHLOROTHIAZIDE (PRINZIDE;ZESTORETIC) 20-25 MG PER TABLET    Take 1 Tab by mouth daily.     METFORMIN (GLUCOPHAGE) 500 MG TABLET    Take 500 mg by mouth 2 times daily (with meals) Takes only as needed    METHADONE (DOLOPHINE) 5 MG TABLET    Take 5 mg by mouth 2 times daily. METOPROLOL (TOPROL-XL) 25 MG XL TABLET    Take 25 mg by mouth daily     MULTIPLE VITAMINS-MINERALS (CENTRUM SILVER PO)    Take  by mouth. NABUMETONE (RELAFEN) 750 MG TABLET    Take 750 mg by mouth 2 times daily     OXYBUTYNIN (DITROPAN-XL) 10 MG EXTENDED RELEASE TABLET        OXYCODONE-ACETAMINOPHEN (PERCOCET) 5-325 MG PER TABLET    Take 1 tablet by mouth every 8 hours as needed for Pain.     PRESTIGE SMART SYSTEM TEST STP STRIP        ZOLPIDEM (AMBIEN) 5 MG TABLET    Take 5 mg by mouth nightly as needed        ALLERGIES     Prochlorperazine and Prochlorperazine edisylate    FAMILY HISTORY       Family History   Problem Relation Age of Onset    Heart Disease Mother     High Blood Pressure Mother     Diabetes Mother     Heart Disease Father     High Blood Pressure Father     Diabetes Father           SOCIAL HISTORY       Social History     Socioeconomic History    Marital status:      Spouse name: None    Number of children: None    Years of education: None    Highest education level: None   Occupational History    None   Social Needs    Financial resource strain: None    Food insecurity:     Worry: None     Inability: None    Transportation needs:     Medical: None     Non-medical: None   Tobacco Use    Smoking status: Never Smoker    Smokeless tobacco: Never Used   Substance and Sexual Activity    Alcohol use: No    Drug use: No    Sexual activity: None   Lifestyle    Physical activity:     Days per week: None     Minutes per session: None    Stress: None   Relationships    Social connections:     Talks on phone: None     Gets together: None     Attends Judaism service: None     Active member of club or organization: None     Attends meetings of clubs or organizations: None     Relationship status: None    Intimate partner violence:     Fear of current or ex partner: None     Emotionally abused: None     Physically abused: None     Forced sexual activity: None   Other Topics Concern    None   Social History Narrative    None       SCREENINGS             PHYSICAL EXAM    (up to 7 for level 4, 8 or more for level 5)     ED Triage Vitals   BP Temp Temp Source Pulse Resp SpO2 Height Weight   06/06/19 1445 06/06/19 1443 06/06/19 1443 06/06/19 1443 06/06/19 1443 -- -- 06/06/19 1440   134/72 97.8 °F (36.6 °C) Oral 81 16   228 lb 9.9 oz (103.7 kg)       Physical Exam   Constitutional: Awake and alert and oriented to person place and time. No apparent distress. Head: No visible evidence of trauma. Normocephalic. Eyes: Pupils equal and reactive. No photophobia. Conjunctiva normal.    HENT: Oral mucosa moist.  Airway patent. Pharynx without erythema. No stridor or drooling. Voice was slightly raspy. Neck:  Soft and supple. no point or axial tenderness. Mild discomfort with rotation to the left and to the right. No masses. No lymphadenopathy. Heart:  Regular rate and rhythm. No murmur. Lungs:  Clear to auscultation. No wheezes, rales, or ronchi. No conversational dyspnea or accessory muscle use. Chest: Chest wall non-tender. No evidence of trauma. Abdomen:  Soft, nondistended, bowel sounds present. Nontender. No guarding rigidity or rebound. No masses. Musculoskeletal: Extremities non-tender with full range of motion. Neurological: Alert and oriented x 3. Speech clear. Cranial nerves II-XII intact. No facial droop. No acute focal motor or sensory deficits. GCS 15. No dysarthria. No aphasia. No pronator drift. Skin: Skin is warm and dry. No rash. Lymphatic:  No lympadenopathy. Psychiatric: Normal mood and affect.  Behavior is normal.         DIAGNOSTIC RESULTS     EKG: All EKG's are interpreted by the Emergency Department Physician who either signs or Co-signs this chart in the absence of a cardiologist.        RADIOLOGY:   Non-plain film images such as CT, Ultrasound and MRI are read by the radiologist. Plain radiographic images are visualized and preliminarily interpreted by the emergency physician with the below findings:        Interpretation per the Radiologist below, if available at the time of this note:    CT Cervical Spine WO Contrast   Final Result   Multilevel degenerative disc disease, without gross fracture. 2 mm of retrolisthesis of C3 on C4, C4 on C5, C5 on C6, which can be   degenerative or due to soft tissue injury. Clinical correlation suggested. 3 mm left apical pulmonary nodule, with follow-up recommendations as below. RECOMMENDATIONS:   Fleischner Society guidelines for follow-up and management of incidentally   detected pulmonary nodules:      Single Solid Nodule:      Nodule size less than 6 mm   In a low-risk patient, no routine follow-up. In a high-risk patient, optional CT at 12 months. Nodule size equals 6-8 mm   In a low-risk patient, CT at 6-12 months, then consider CT at 18-24 months. In a high-risk patient, CT at 6-12 months, then CT at 18-24 months. Nodule size greater than 8 mm         In a low-risk patient, consider CT at 3 months, PET/CT, or tissue sampling. In a high-risk patient, consider CT at 3 months, PET/CT, or tissue sampling. Multiple Solid Nodules:      Nodule size less than 6 mm   In a low-risk patient, no routine follow-up. In a high-risk patient, optional CT at 12 months. Nodule size equals 6-8 mm   In a low-risk patient, CT at 3-6 months, then consider CT at 18-24 months. In a high-risk patient, CT at 3-6 months, then CT at 18-24 months. Nodule size greater than 8 mm   In a low-risk patient, CT at 3-6 months, then consider CT at 18-24 months. In a high-risk patient, CT at 3-6 months, then CT at 18-24 months.       - Low risk patients include individuals with minimal or absent history of   smoking and other known risk factors. - High risk patients include individuals with a history or smoking or known   risk factors. Radiology 2017 http://pubs. rsna.org/doi/full/10.1148/radiol. 1294523008         CT Head WO Contrast   Final Result   Overall, stable noncontrast head CT compared with 05/22/2019. No acute   intracranial hemorrhage or midline shift. ED BEDSIDE ULTRASOUND:   Performed by ED Physician - none    LABS:  Labs Reviewed   BASIC METABOLIC PANEL W/ REFLEX TO MG FOR LOW K - Abnormal; Notable for the following components:       Result Value    Chloride 96 (*)     Glucose 175 (*)     All other components within normal limits    Narrative:     Performed at:  [facility]  [address], [place] [employer] [employer] [address]   Phone [phone]   CBC WITH AUTO DIFFERENTIAL    Narrative:     Performed at:  [facility]  [address], [place] [employer] [employer] [address]   Phone [phone]   URINE RT REFLEX TO CULTURE    Narrative:     Performed at:  [facility]  [address] [facility] [employer] [address]   Phone [phone]   MAGNESIUM    Narrative:     Performed at:  [facility]  [address] [facility] [employer] [address]   Phone [phone]       All other labs were within normal range or not returned as of this dictation. EMERGENCY DEPARTMENT COURSE and DIFFERENTIAL DIAGNOSIS/MDM:   Vitals:    Vitals:    06/06/19 1440 06/06/19 1443 06/06/19 1445   BP:   134/72   Pulse:  81    Resp:  16    Temp:  97.8 °F (36.6 °C)    TempSrc:  Oral    Weight: 228 lb 9.9 oz (103.7 kg)         The patient presented with persistent headache after striking his head on the concrete 2 weeks ago. Initial CT at that time was negative. Prior records were reviewed.  I did add a CT cervical spine today given the fact that he was not previously mentioned does have some discomfort with rotating his recognition program.  Efforts were made to edit the dictations but occasionally words are mis-transcribed. )    [id number] Arturo Downey DO (electronically signed)  Attending Emergency Physician          Anyi Farfan DO  06/06/19 3332

## 2019-06-10 ENCOUNTER — OFFICE VISIT (OUTPATIENT)
Dept: ENT CLINIC | Age: 64
End: 2019-06-10
Payer: COMMERCIAL

## 2019-06-10 VITALS
WEIGHT: 237.6 LBS | BODY MASS INDEX: 35.19 KG/M2 | TEMPERATURE: 99.4 F | HEART RATE: 79 BPM | SYSTOLIC BLOOD PRESSURE: 139 MMHG | HEIGHT: 69 IN | DIASTOLIC BLOOD PRESSURE: 83 MMHG

## 2019-06-10 DIAGNOSIS — R49.9 HOARSENESS OR CHANGING VOICE: Primary | ICD-10-CM

## 2019-06-10 PROCEDURE — 4040F PNEUMOC VAC/ADMIN/RCVD: CPT | Performed by: OTOLARYNGOLOGY

## 2019-06-10 PROCEDURE — G8417 CALC BMI ABV UP PARAM F/U: HCPCS | Performed by: OTOLARYNGOLOGY

## 2019-06-10 PROCEDURE — 1036F TOBACCO NON-USER: CPT | Performed by: OTOLARYNGOLOGY

## 2019-06-10 PROCEDURE — 31575 DIAGNOSTIC LARYNGOSCOPY: CPT | Performed by: OTOLARYNGOLOGY

## 2019-06-10 PROCEDURE — 3017F COLORECTAL CA SCREEN DOC REV: CPT | Performed by: OTOLARYNGOLOGY

## 2019-06-10 PROCEDURE — 99203 OFFICE O/P NEW LOW 30 MIN: CPT | Performed by: OTOLARYNGOLOGY

## 2019-06-10 PROCEDURE — 1123F ACP DISCUSS/DSCN MKR DOCD: CPT | Performed by: OTOLARYNGOLOGY

## 2019-06-10 PROCEDURE — G8427 DOCREV CUR MEDS BY ELIG CLIN: HCPCS | Performed by: OTOLARYNGOLOGY

## 2019-06-10 NOTE — PROGRESS NOTES
CHIEF COMPLAINT: Hoarseness    HISTORY OF PRESENT ILLNESS:  70 y.o. male who presents with hoarseness Present for several months. Occurred later in the day after voice use. Symptoms were intermittent. Last episode of hoarseness was 1 week ago. No dysphagia. No airway concerns. No throat pain. No globus sensation. Does not clear throat much. Has never smoked. PAST MEDICAL HISTORY:   Social History     Tobacco Use   Smoking Status Never Smoker   Smokeless Tobacco Never Used                                                    Social History     Substance and Sexual Activity   Alcohol Use No                                                    Current Outpatient Medications:     butalbital-APAP-caffeine (FIORICET) -40 MG CAPS per capsule, Take 1 capsule by mouth every 6 hours as needed for Headaches, Disp: 15 capsule, Rfl: 0    oxyCODONE-acetaminophen (PERCOCET) 5-325 MG per tablet, Take 1 tablet by mouth every 8 hours as needed for Pain., Disp: , Rfl:     hydrochlorothiazide (HYDRODIURIL) 25 MG tablet, Take 0.5 tablets by mouth daily, Disp: 7 tablet, Rfl: 0    gabapentin (NEURONTIN) 800 MG tablet, Take 800 mg by mouth 3 times daily.  , Disp: , Rfl:     nabumetone (RELAFEN) 750 MG tablet, Take 750 mg by mouth 2 times daily , Disp: , Rfl:     oxybutynin (DITROPAN-XL) 10 MG extended release tablet, , Disp: , Rfl:     amitriptyline (ELAVIL) 75 MG tablet, Take 75 mg by mouth nightly as needed , Disp: , Rfl:     augmented betamethasone dipropionate (DIPROLENE-AF) 0.05 % cream, Apply thin layer topically to L ankle every third day, Disp: 45 g, Rfl: 1    metformin (GLUCOPHAGE) 500 MG tablet, Take 500 mg by mouth 2 times daily (with meals) Takes only as needed, Disp: , Rfl:     metoprolol (TOPROL-XL) 25 MG XL tablet, Take 25 mg by mouth daily , Disp: , Rfl:     zolpidem (AMBIEN) 5 MG tablet, Take 5 mg by mouth nightly as needed , Disp: , Rfl:     baclofen (LIORESAL) 10 MG tablet, Take 10 mg by mouth 3 times LARYNGOSCOPY:  Nares topically anaesthetized with lidocaine spray. Fiberoptic scope passed per naris into nasopharynx and hypopharyrnx and larynx visualized. Normal tongue base                                                          Normal epiglottis                                                          Normal vocal cords                                                          Normal pyriform sinuses  IMPRESSION: No laryngeal pathology. PLAN: Patient reassured. FOLLOW-UP: As needed.

## 2019-06-27 ENCOUNTER — OFFICE VISIT (OUTPATIENT)
Dept: ORTHOPEDIC SURGERY | Age: 64
End: 2019-06-27
Payer: COMMERCIAL

## 2019-06-27 VITALS
BODY MASS INDEX: 35.1 KG/M2 | DIASTOLIC BLOOD PRESSURE: 80 MMHG | RESPIRATION RATE: 16 BRPM | WEIGHT: 237 LBS | SYSTOLIC BLOOD PRESSURE: 137 MMHG | HEIGHT: 69 IN | HEART RATE: 64 BPM | TEMPERATURE: 98.1 F

## 2019-06-27 DIAGNOSIS — M12.812 ROTATOR CUFF TEAR ARTHROPATHY OF LEFT SHOULDER: Primary | ICD-10-CM

## 2019-06-27 DIAGNOSIS — M75.102 ROTATOR CUFF TEAR ARTHROPATHY OF LEFT SHOULDER: Primary | ICD-10-CM

## 2019-06-27 PROCEDURE — G8427 DOCREV CUR MEDS BY ELIG CLIN: HCPCS | Performed by: ORTHOPAEDIC SURGERY

## 2019-06-27 PROCEDURE — G8417 CALC BMI ABV UP PARAM F/U: HCPCS | Performed by: ORTHOPAEDIC SURGERY

## 2019-06-27 PROCEDURE — 1123F ACP DISCUSS/DSCN MKR DOCD: CPT | Performed by: ORTHOPAEDIC SURGERY

## 2019-06-27 PROCEDURE — 99213 OFFICE O/P EST LOW 20 MIN: CPT | Performed by: ORTHOPAEDIC SURGERY

## 2019-06-27 PROCEDURE — 1036F TOBACCO NON-USER: CPT | Performed by: ORTHOPAEDIC SURGERY

## 2019-06-27 PROCEDURE — 4040F PNEUMOC VAC/ADMIN/RCVD: CPT | Performed by: ORTHOPAEDIC SURGERY

## 2019-06-27 PROCEDURE — 3017F COLORECTAL CA SCREEN DOC REV: CPT | Performed by: ORTHOPAEDIC SURGERY

## 2019-06-27 NOTE — PROGRESS NOTES
This dictation was done with [employer] dictation and may contain mechanical errors related to translation. Blood pressure 137/80, pulse 64, temperature 98.1 °F (36.7 °C), temperature source Temporal, resp. rate 16, height 5' 9\" (1.753 m), weight 237 lb (107.5 kg).

## 2019-07-01 ENCOUNTER — OFFICE VISIT (OUTPATIENT)
Dept: ORTHOPEDIC SURGERY | Age: 64
End: 2019-07-01
Payer: COMMERCIAL

## 2019-07-01 VITALS
WEIGHT: 236.99 LBS | DIASTOLIC BLOOD PRESSURE: 79 MMHG | HEART RATE: 75 BPM | SYSTOLIC BLOOD PRESSURE: 128 MMHG | RESPIRATION RATE: 17 BRPM | HEIGHT: 69 IN | BODY MASS INDEX: 35.1 KG/M2

## 2019-07-01 DIAGNOSIS — M75.112 NONTRAUMATIC INCOMPLETE TEAR OF LEFT ROTATOR CUFF: Primary | ICD-10-CM

## 2019-07-01 PROCEDURE — 1036F TOBACCO NON-USER: CPT | Performed by: ORTHOPAEDIC SURGERY

## 2019-07-01 PROCEDURE — 3017F COLORECTAL CA SCREEN DOC REV: CPT | Performed by: ORTHOPAEDIC SURGERY

## 2019-07-01 PROCEDURE — G8427 DOCREV CUR MEDS BY ELIG CLIN: HCPCS | Performed by: ORTHOPAEDIC SURGERY

## 2019-07-01 PROCEDURE — 4040F PNEUMOC VAC/ADMIN/RCVD: CPT | Performed by: ORTHOPAEDIC SURGERY

## 2019-07-01 PROCEDURE — 1123F ACP DISCUSS/DSCN MKR DOCD: CPT | Performed by: ORTHOPAEDIC SURGERY

## 2019-07-01 PROCEDURE — G8417 CALC BMI ABV UP PARAM F/U: HCPCS | Performed by: ORTHOPAEDIC SURGERY

## 2019-07-01 PROCEDURE — 99214 OFFICE O/P EST MOD 30 MIN: CPT | Performed by: ORTHOPAEDIC SURGERY

## 2019-07-01 NOTE — PROGRESS NOTES
CHIEF COMPLAINT: Left shoulder pain    History:    Josh Dent is a 75 y.o. male referred by JOSE ANTONIO Sabillon for evaluation and treatment of Left shoulder pain. This is evaluated as a personal injury. The pain is described as aching. The pain began 3 months ago. There was not an injury. Pain is rated as a 5/10 . Pain is located lateral.  The symptoms are worse with reaching, lifting, dressing self, work at or above shoulder height, difficulty sleeping on affected side. Symptoms improve with injection. Limited activities include work at or above shoulder height. No stiffness, no weakness reported. The patient does report night pain. The patient has had PT. The patient has had an injection. The patient has tried NSAIDs. The patient has tried ice. Patient's occupation is retired. 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 Past Medical History:   Diagnosis Date    Arthritis     Diabetes mellitus ([address]     Fracture of nasal bone     Headache     Hearing loss     High blood pressure     Hyperlipidemia     Wears partial dentures        Past Surgical History:   Procedure Laterality Date    APPENDECTOMY      BACK SURGERY  10/2007   1979  1980    CARPAL TUNNEL RELEASE Bilateral     HIP ARTHROPLASTY Left 04/04/2017    HIP SURGERY  04/2008    bursa removed    JOINT REPLACEMENT  2009, 2006, 2003, 1997    Left knee replacement    KNEE SURGERY Left     [id number] scopes       Current Outpatient Medications on File Prior to Visit   Medication Sig Dispense Refill    butalbital-APAP-caffeine (FIORICET) -40 MG CAPS per capsule Take 1 capsule by mouth every 6 hours as needed for Headaches 15 capsule 0    oxyCODONE-acetaminophen (PERCOCET) 5-325 MG per tablet Take 1 tablet by mouth every 8 hours as needed for Pain.  hydrochlorothiazide (HYDRODIURIL) 25 MG tablet Take 0.5 tablets by mouth daily 7 tablet 0    gabapentin (NEURONTIN) 800 MG tablet Take 800 mg by mouth 3 times daily.        risks of the surgical procedure. We also discussed the importance of postoperative rehabilitation. He has had full opportunity to ask his questions. I have answered them all to his satisfaction. I feel that Mr. Chelsey Michelle understands our discussion today and he will provide written informed consent for the procedure. Plan for left shoulder arthroscopy, subacromial decompression, rotator cuff debridement vs repair. The patient will see their PCP for H&P and clearance for surgery.

## 2019-07-01 NOTE — LETTER
 Number of children: Not on file    Years of education: Not on file    Highest education level: Not on file   Occupational History    Not on file   Social Needs    Financial resource strain: Not on file    Food insecurity:     Worry: Not on file     Inability: Not on file    Transportation needs:     Medical: Not on file     Non-medical: Not on file   Tobacco Use    Smoking status: Never Smoker    Smokeless tobacco: Never Used   Substance and Sexual Activity    Alcohol use: No    Drug use: No    Sexual activity: Not on file   Lifestyle    Physical activity:     Days per week: Not on file     Minutes per session: Not on file    Stress: Not on file   Relationships    Social connections:     Talks on phone: Not on file     Gets together: Not on file     Attends Judaism service: Not on file     Active member of club or organization: Not on file     Attends meetings of clubs or organizations: Not on file     Relationship status: Not on file    Intimate partner violence:     Fear of current or ex partner: Not on file     Emotionally abused: Not on file     Physically abused: Not on file     Forced sexual activity: Not on file   Other Topics Concern    Not on file   Social History Narrative    Not on file       Family History   Problem Relation Age of Onset    Heart Disease Mother     High Blood Pressure Mother     Diabetes Mother     Heart Disease Father     High Blood Pressure Father     Diabetes Father        Review of Systems:   Pertinent items are noted in HPI. 13 point review of systems from Patient History Form dated 11/1/18 and available in the patient's chart under the Media tab. No interval changes.       Physical Examination:      Vital signs:  /79   Pulse 75   Resp 17   Ht 5' 9.02\" (1.753 m)   Wt 236 lb 15.9 oz (107.5 kg)   BMI 34.98 kg/m²      General:   alert, appears stated age, cooperative and no distress   Left Shoulder

## 2019-07-02 NOTE — COMMUNICATION BODY
CHIEF COMPLAINT: Left shoulder pain    History:    Chipper Blizzard is a 75 y.o. male referred by JOSE ANTONIO Aguilar for evaluation and treatment of Left shoulder pain. This is evaluated as a personal injury. The pain is described as aching. The pain began 3 months ago. There was not an injury. Pain is rated as a 5/10 . Pain is located lateral.  The symptoms are worse with reaching, lifting, dressing self, work at or above shoulder height, difficulty sleeping on affected side. Symptoms improve with injection. Limited activities include work at or above shoulder height. No stiffness, no weakness reported. The patient does report night pain. The patient has had PT. The patient has had an injection. The patient has tried NSAIDs. The patient has tried ice. Patient's occupation is retired. 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 Past Medical History:   Diagnosis Date    Arthritis     Diabetes mellitus [address] [address]     Fracture of nasal bone     Headache     Hearing loss     High blood pressure     Hyperlipidemia     Wears partial dentures        Past Surgical History:   Procedure Laterality Date    APPENDECTOMY      BACK SURGERY  10/2007   1979  1980    CARPAL TUNNEL RELEASE Bilateral     HIP ARTHROPLASTY Left 04/04/2017    HIP SURGERY  04/2008    bursa removed    JOINT REPLACEMENT  2009, 2006, 2003, 1997    Left knee replacement    KNEE SURGERY Left     [id number] scopes       Current Outpatient Medications on File Prior to Visit   Medication Sig Dispense Refill    butalbital-APAP-caffeine (FIORICET) -40 MG CAPS per capsule Take 1 capsule by mouth every 6 hours as needed for Headaches 15 capsule 0    oxyCODONE-acetaminophen (PERCOCET) 5-325 MG per tablet Take 1 tablet by mouth every 8 hours as needed for Pain.  hydrochlorothiazide (HYDRODIURIL) 25 MG tablet Take 0.5 tablets by mouth daily 7 tablet 0    gabapentin (NEURONTIN) 800 MG tablet Take 800 mg by mouth 3 times daily.       

## 2019-07-05 NOTE — PROGRESS NOTES
Patient is a 66-year-old male who comes in today for further evaluation treatment of left shoulder pain. He's been treated with conservative management of anti-inflammatories some physical therapy and also had a cortisone injection on 5/6/2019. He got minimal relief from this cortisone injection and now rates his pain at 7 out of 10. He has no history of injury. This patient did I believe have an MRI which showed a 50% partial tear of the supraspinatous muscle unit. He continues to have pain and now is here for further evaluation. Patient Active Problem List   Diagnosis    Back pain    Left hip pain    Trochanteric bursitis    Status post lumbar surgery    Status post lumbar spinal fusion    Postlaminectomy syndrome, lumbar region    DDD (degenerative disc disease), lumbosacral    Contusion of elbow    SV[facility]ra[facility]ricular tachycardia) ([facility])    Trochanteric bursitis of left hip    Elbow pain, right    Lateral epicondylitis of right elbow    Trochanteric bursitis, left hip    Primary osteoarthritis of left hip    Arthritis of left hip    Status post total hip replacement, left 4/4/17    Lateral epicondylitis of left elbow    Incomplete tear of left rotator cuff    History of claustrophobia     Prior to Visit Medications    Medication Sig Taking? Authorizing Provider   butalbital-APAP-caffeine (FIORICET) -40 MG CAPS per capsule Take 1 capsule by mouth every 6 hours as needed for Headaches  Dexter Chiu DO   oxyCODONE-acetaminophen (PERCOCET) 5-325 MG per tablet Take 1 tablet by mouth every 8 hours as needed for Pain. Historical Provider, MD   hydrochlorothiazide (HYDRODIURIL) 25 MG tablet Take 0.5 tablets by mouth daily  Wilder Coleman MD   gabapentin (NEURONTIN) 800 MG tablet Take 800 mg by mouth 3 times daily.    Historical Provider, MD   nabumetone (RELAFEN) 750 MG tablet Take 750 mg by mouth 2 times daily   Historical Provider, MD   oxybutynin (DITROPAN-XL) 10 MG extended release tablet   Historical Provider, MD   amitriptyline (ELAVIL) 75 MG tablet Take 75 mg by mouth nightly as needed   Historical Provider, MD   augmented betamethasone dipropionate (DIPROLENE-AF) 0.05 % cream Apply thin layer topically to L ankle every third day  Karen Hernandez MD   metformin (GLUCOPHAGE) 500 MG tablet Take 500 mg by mouth 2 times daily (with meals) Takes only as needed  Historical Provider, MD   metoprolol (TOPROL-XL) 25 MG XL tablet Take 25 mg by mouth daily   Historical Provider, MD   zolpidem (AMBIEN) 5 MG tablet Take 5 mg by mouth nightly as needed   Historical Provider, MD   baclofen (LIORESAL) 10 MG tablet Take 10 mg by mouth 3 times daily. Historical Provider, MD   methadone (DOLOPHINE) 5 MG tablet Take 5 mg by mouth 2 times daily. Historical Provider, MD   [address] TEST STP strip   Historical Provider, MD   Multiple Vitamins-Minerals (CENTRUM SILVER PO) Take  by mouth. Historical Provider, MD   lisinopril-hydrochlorothiazide (PRINZIDE;ZESTORETIC) 20-25 MG per tablet Take 1 Tab by mouth daily. Historical Provider, MD   atorvastatin (LIPITOR) 20 MG tablet Take 20 mg by mouth nightly   Historical Provider, MD     Physical examination 24-year-old male oriented ×3 temperature is 98.1. Examination of his neck shows good range of motion no significant pain tenderness or instability. Motor exam shows trapezius deltoid biceps triceps wrist extensors and flexors and hand intrinsics are intact 4-5 over 5 to the left upper extremity. Sensation and perfusion are intact to the left hand and fingers. Axillary motor and sensory nerve intact to the left upper extremity. Examination of the left shoulder shows tenderness over the anterolateral aspect of the acromion and also anterior bicipital tenderness. No active signs of infection decreased range of motion is seen with no obvious instability of the shoulder.   No other obvious cutaneous lesions lipedema or cellulitic processes are seen in the shoulder or left upper extremity on today's exam.    Impression 61-year-old male with partial tear of the left rotator cuff. Plan we had a 15 minute face-to-face discussion with this patient of which greater than 50% of time was spent on counseling him about further care and treatment of his shoulder issues. At this point in he does not need any type of arthroplasty however he may benefit from some type of arthroscopic evaluation of the shoulder. Unfortunately the patient is on chronic opioids and this may decrease satisfaction overall with this patient outcome. We will send him to Dr. Jarvis Fields  for further evaluation treatment.

## 2019-07-25 ENCOUNTER — ANESTHESIA EVENT (OUTPATIENT)
Dept: OPERATING ROOM | Age: 64
End: 2019-07-25
Payer: COMMERCIAL

## 2019-07-26 ENCOUNTER — TELEPHONE (OUTPATIENT)
Dept: ORTHOPEDIC SURGERY | Age: 64
End: 2019-07-26

## 2019-07-26 ENCOUNTER — ANESTHESIA (OUTPATIENT)
Dept: OPERATING ROOM | Age: 64
End: 2019-07-26
Payer: COMMERCIAL

## 2019-07-26 ENCOUNTER — HOSPITAL ENCOUNTER (OUTPATIENT)
Age: 64
Setting detail: OUTPATIENT SURGERY
Discharge: HOME | End: 2019-07-26
Attending: ORTHOPAEDIC SURGERY | Admitting: ORTHOPAEDIC SURGERY
Payer: COMMERCIAL

## 2019-07-26 VITALS
HEIGHT: 69 IN | BODY MASS INDEX: 34.97 KG/M2 | RESPIRATION RATE: 20 BRPM | WEIGHT: 236.11 LBS | OXYGEN SATURATION: 93 % | DIASTOLIC BLOOD PRESSURE: 75 MMHG | SYSTOLIC BLOOD PRESSURE: 141 MMHG | HEART RATE: 61 BPM | TEMPERATURE: 96.9 F

## 2019-07-26 VITALS
SYSTOLIC BLOOD PRESSURE: 82 MMHG | RESPIRATION RATE: 13 BRPM | OXYGEN SATURATION: 97 % | DIASTOLIC BLOOD PRESSURE: 50 MMHG | TEMPERATURE: 96.1 F

## 2019-07-26 DIAGNOSIS — M75.112 NONTRAUMATIC INCOMPLETE TEAR OF LEFT ROTATOR CUFF: Primary | ICD-10-CM

## 2019-07-26 LAB
GLUCOSE BLD-MCNC: 135 MG/DL (ref 70–99)
GLUCOSE BLD-MCNC: 81 MG/DL (ref 70–99)
PERFORMED ON: ABNORMAL
PERFORMED ON: NORMAL

## 2019-07-26 PROCEDURE — 3700000000 HC ANESTHESIA ATTENDED CARE: Performed by: ORTHOPAEDIC SURGERY

## 2019-07-26 PROCEDURE — 3600000004 HC SURGERY LEVEL 4 BASE: Performed by: ORTHOPAEDIC SURGERY

## 2019-07-26 PROCEDURE — 6370000000 HC RX 637 (ALT 250 FOR IP): Performed by: ORTHOPAEDIC SURGERY

## 2019-07-26 PROCEDURE — 2580000003 HC RX 258: Performed by: ANESTHESIOLOGY

## 2019-07-26 PROCEDURE — 7100000011 HC PHASE II RECOVERY - ADDTL 15 MIN: Performed by: ORTHOPAEDIC SURGERY

## 2019-07-26 PROCEDURE — 3600000014 HC SURGERY LEVEL 4 ADDTL 15MIN: Performed by: ORTHOPAEDIC SURGERY

## 2019-07-26 PROCEDURE — 2500000003 HC RX 250 WO HCPCS

## 2019-07-26 PROCEDURE — 6360000002 HC RX W HCPCS: Performed by: NURSE ANESTHETIST, CERTIFIED REGISTERED

## 2019-07-26 PROCEDURE — C1713 ANCHOR/SCREW BN/BN,TIS/BN: HCPCS | Performed by: ORTHOPAEDIC SURGERY

## 2019-07-26 PROCEDURE — 7100000001 HC PACU RECOVERY - ADDTL 15 MIN: Performed by: ORTHOPAEDIC SURGERY

## 2019-07-26 PROCEDURE — 3700000001 HC ADD 15 MINUTES (ANESTHESIA): Performed by: ORTHOPAEDIC SURGERY

## 2019-07-26 PROCEDURE — 29826 SHO ARTHRS SRG DECOMPRESSION: CPT | Performed by: ORTHOPAEDIC SURGERY

## 2019-07-26 PROCEDURE — 2500000003 HC RX 250 WO HCPCS: Performed by: NURSE ANESTHETIST, CERTIFIED REGISTERED

## 2019-07-26 PROCEDURE — 2720000010 HC SURG SUPPLY STERILE: Performed by: ORTHOPAEDIC SURGERY

## 2019-07-26 PROCEDURE — 29827 SHO ARTHRS SRG RT8TR CUF RPR: CPT | Performed by: ORTHOPAEDIC SURGERY

## 2019-07-26 PROCEDURE — L3809 WHFO W/O JOINTS PRE OTS: HCPCS | Performed by: ORTHOPAEDIC SURGERY

## 2019-07-26 PROCEDURE — L3650 SO 8 ABD RESTRAINT PRE OTS: HCPCS | Performed by: ORTHOPAEDIC SURGERY

## 2019-07-26 PROCEDURE — 7100000000 HC PACU RECOVERY - FIRST 15 MIN: Performed by: ORTHOPAEDIC SURGERY

## 2019-07-26 PROCEDURE — 2709999900 HC NON-CHARGEABLE SUPPLY: Performed by: ORTHOPAEDIC SURGERY

## 2019-07-26 PROCEDURE — 2580000003 HC RX 258: Performed by: NURSE ANESTHETIST, CERTIFIED REGISTERED

## 2019-07-26 PROCEDURE — 7100000010 HC PHASE II RECOVERY - FIRST 15 MIN: Performed by: ORTHOPAEDIC SURGERY

## 2019-07-26 DEVICE — ANCHOR SUT L24.5MM DIA5.5MM BIOCOMP VENT SELF PUNCHING: Type: IMPLANTABLE DEVICE | Site: SHOULDER | Status: FUNCTIONAL

## 2019-07-26 RX ORDER — SODIUM CHLORIDE 0.9 % (FLUSH) 0.9 %
10 SYRINGE (ML) INJECTION PRN
Status: DISCONTINUED | OUTPATIENT
Start: 2019-07-26 | End: 2019-07-26 | Stop reason: HOSPADM

## 2019-07-26 RX ORDER — OXYCODONE HYDROCHLORIDE AND ACETAMINOPHEN 5; 325 MG/1; MG/1
1 TABLET ORAL PRN
Status: DISCONTINUED | OUTPATIENT
Start: 2019-07-26 | End: 2019-07-26 | Stop reason: HOSPADM

## 2019-07-26 RX ORDER — HYDRALAZINE HYDROCHLORIDE 20 MG/ML
5 INJECTION INTRAMUSCULAR; INTRAVENOUS
Status: DISCONTINUED | OUTPATIENT
Start: 2019-07-26 | End: 2019-07-26 | Stop reason: HOSPADM

## 2019-07-26 RX ORDER — GLYCOPYRROLATE 0.2 MG/ML
INJECTION INTRAMUSCULAR; INTRAVENOUS PRN
Status: DISCONTINUED | OUTPATIENT
Start: 2019-07-26 | End: 2019-07-26 | Stop reason: SDUPTHER

## 2019-07-26 RX ORDER — ONDANSETRON 2 MG/ML
4 INJECTION INTRAMUSCULAR; INTRAVENOUS
Status: DISCONTINUED | OUTPATIENT
Start: 2019-07-26 | End: 2019-07-26 | Stop reason: HOSPADM

## 2019-07-26 RX ORDER — ONDANSETRON 2 MG/ML
INJECTION INTRAMUSCULAR; INTRAVENOUS PRN
Status: DISCONTINUED | OUTPATIENT
Start: 2019-07-26 | End: 2019-07-26 | Stop reason: SDUPTHER

## 2019-07-26 RX ORDER — LIDOCAINE HYDROCHLORIDE 20 MG/ML
INJECTION, SOLUTION EPIDURAL; INFILTRATION; INTRACAUDAL; PERINEURAL PRN
Status: DISCONTINUED | OUTPATIENT
Start: 2019-07-26 | End: 2019-07-26 | Stop reason: SDUPTHER

## 2019-07-26 RX ORDER — SODIUM CHLORIDE 0.9 % (FLUSH) 0.9 %
10 SYRINGE (ML) INJECTION EVERY 12 HOURS SCHEDULED
Status: DISCONTINUED | OUTPATIENT
Start: 2019-07-26 | End: 2019-07-26 | Stop reason: HOSPADM

## 2019-07-26 RX ORDER — OXYCODONE HYDROCHLORIDE 5 MG/1
5 CAPSULE ORAL EVERY 4 HOURS PRN
Qty: 35 CAPSULE | Refills: 0 | Status: SHIPPED | OUTPATIENT
Start: 2019-07-26 | End: 2019-08-02

## 2019-07-26 RX ORDER — VECURONIUM BROMIDE 1 MG/ML
INJECTION, POWDER, LYOPHILIZED, FOR SOLUTION INTRAVENOUS PRN
Status: DISCONTINUED | OUTPATIENT
Start: 2019-07-26 | End: 2019-07-26 | Stop reason: SDUPTHER

## 2019-07-26 RX ORDER — BACITRACIN ZINC AND POLYMYXIN B SULFATE 500; 1000 [USP'U]/G; [USP'U]/G
OINTMENT TOPICAL
Status: COMPLETED | OUTPATIENT
Start: 2019-07-26 | End: 2019-07-26

## 2019-07-26 RX ORDER — SODIUM CHLORIDE 9 MG/ML
INJECTION, SOLUTION INTRAVENOUS CONTINUOUS
Status: DISCONTINUED | OUTPATIENT
Start: 2019-07-26 | End: 2019-07-26 | Stop reason: HOSPADM

## 2019-07-26 RX ORDER — FENTANYL CITRATE 50 UG/ML
INJECTION, SOLUTION INTRAMUSCULAR; INTRAVENOUS PRN
Status: DISCONTINUED | OUTPATIENT
Start: 2019-07-26 | End: 2019-07-26 | Stop reason: SDUPTHER

## 2019-07-26 RX ORDER — LABETALOL HYDROCHLORIDE 5 MG/ML
5 INJECTION, SOLUTION INTRAVENOUS EVERY 10 MIN PRN
Status: DISCONTINUED | OUTPATIENT
Start: 2019-07-26 | End: 2019-07-26 | Stop reason: HOSPADM

## 2019-07-26 RX ORDER — MORPHINE SULFATE 2 MG/ML
2 INJECTION, SOLUTION INTRAMUSCULAR; INTRAVENOUS EVERY 5 MIN PRN
Status: DISCONTINUED | OUTPATIENT
Start: 2019-07-26 | End: 2019-07-26 | Stop reason: HOSPADM

## 2019-07-26 RX ORDER — SUCCINYLCHOLINE/SOD CL,ISO/PF 200MG/10ML
SYRINGE (ML) INTRAVENOUS PRN
Status: DISCONTINUED | OUTPATIENT
Start: 2019-07-26 | End: 2019-07-26 | Stop reason: SDUPTHER

## 2019-07-26 RX ORDER — 0.9 % SODIUM CHLORIDE 0.9 %
VIAL (ML) INJECTION PRN
Status: DISCONTINUED | OUTPATIENT
Start: 2019-07-26 | End: 2019-07-26 | Stop reason: SDUPTHER

## 2019-07-26 RX ORDER — NALOXONE HYDROCHLORIDE 4 MG/.1ML
1 SPRAY NASAL PRN
Qty: 1 EACH | Refills: 5 | Status: SHIPPED | OUTPATIENT
Start: 2019-07-26 | End: 2022-01-18

## 2019-07-26 RX ORDER — PROPOFOL 10 MG/ML
INJECTION, EMULSION INTRAVENOUS PRN
Status: DISCONTINUED | OUTPATIENT
Start: 2019-07-26 | End: 2019-07-26 | Stop reason: SDUPTHER

## 2019-07-26 RX ORDER — OXYCODONE HYDROCHLORIDE AND ACETAMINOPHEN 5; 325 MG/1; MG/1
2 TABLET ORAL PRN
Status: DISCONTINUED | OUTPATIENT
Start: 2019-07-26 | End: 2019-07-26 | Stop reason: HOSPADM

## 2019-07-26 RX ORDER — DEXAMETHASONE SODIUM PHOSPHATE 4 MG/ML
INJECTION, SOLUTION INTRA-ARTICULAR; INTRALESIONAL; INTRAMUSCULAR; INTRAVENOUS; SOFT TISSUE PRN
Status: DISCONTINUED | OUTPATIENT
Start: 2019-07-26 | End: 2019-07-26 | Stop reason: SDUPTHER

## 2019-07-26 RX ORDER — BUPIVACAINE HYDROCHLORIDE 5 MG/ML
INJECTION, SOLUTION EPIDURAL; INTRACAUDAL
Status: DISCONTINUED
Start: 2019-07-26 | End: 2019-07-26 | Stop reason: HOSPADM

## 2019-07-26 RX ORDER — MEPERIDINE HYDROCHLORIDE 25 MG/ML
12.5 INJECTION INTRAMUSCULAR; INTRAVENOUS; SUBCUTANEOUS EVERY 5 MIN PRN
Status: DISCONTINUED | OUTPATIENT
Start: 2019-07-26 | End: 2019-07-26 | Stop reason: HOSPADM

## 2019-07-26 RX ORDER — MORPHINE SULFATE 2 MG/ML
1 INJECTION, SOLUTION INTRAMUSCULAR; INTRAVENOUS EVERY 5 MIN PRN
Status: DISCONTINUED | OUTPATIENT
Start: 2019-07-26 | End: 2019-07-26 | Stop reason: HOSPADM

## 2019-07-26 RX ORDER — MIDAZOLAM HYDROCHLORIDE 1 MG/ML
INJECTION INTRAMUSCULAR; INTRAVENOUS PRN
Status: DISCONTINUED | OUTPATIENT
Start: 2019-07-26 | End: 2019-07-26 | Stop reason: SDUPTHER

## 2019-07-26 RX ADMIN — LIDOCAINE HYDROCHLORIDE 100 MG: 20 INJECTION, SOLUTION EPIDURAL; INFILTRATION; INTRACAUDAL; PERINEURAL at 10:37

## 2019-07-26 RX ADMIN — SODIUM CHLORIDE 4 ML: 9 INJECTION INTRAMUSCULAR; INTRAVENOUS; SUBCUTANEOUS at 10:39

## 2019-07-26 RX ADMIN — GLYCOPYRROLATE 0.2 MG: 0.2 INJECTION, SOLUTION INTRAMUSCULAR; INTRAVENOUS at 10:40

## 2019-07-26 RX ADMIN — DEXAMETHASONE SODIUM PHOSPHATE 8 MG: 4 INJECTION, SOLUTION INTRAMUSCULAR; INTRAVENOUS at 10:40

## 2019-07-26 RX ADMIN — Medication 140 MG: at 10:37

## 2019-07-26 RX ADMIN — VECURONIUM BROMIDE 4 MG: 1 INJECTION, POWDER, LYOPHILIZED, FOR SOLUTION INTRAVENOUS at 10:39

## 2019-07-26 RX ADMIN — ONDANSETRON 4 MG: 2 INJECTION INTRAMUSCULAR; INTRAVENOUS at 10:40

## 2019-07-26 RX ADMIN — SODIUM CHLORIDE: 9 INJECTION, SOLUTION INTRAVENOUS at 10:29

## 2019-07-26 RX ADMIN — SUGAMMADEX 300 MG: 100 INJECTION, SOLUTION INTRAVENOUS at 11:25

## 2019-07-26 RX ADMIN — SODIUM CHLORIDE: 9 INJECTION, SOLUTION INTRAVENOUS at 11:03

## 2019-07-26 RX ADMIN — VECURONIUM BROMIDE 2 MG: 1 INJECTION, POWDER, LYOPHILIZED, FOR SOLUTION INTRAVENOUS at 11:16

## 2019-07-26 RX ADMIN — PROPOFOL 200 MG: 10 INJECTION, EMULSION INTRAVENOUS at 10:37

## 2019-07-26 RX ADMIN — MIDAZOLAM 2 MG: 1 INJECTION INTRAMUSCULAR; INTRAVENOUS at 10:30

## 2019-07-26 RX ADMIN — SODIUM CHLORIDE 2 ML: 9 INJECTION INTRAMUSCULAR; INTRAVENOUS; SUBCUTANEOUS at 11:16

## 2019-07-26 RX ADMIN — FENTANYL CITRATE 100 MCG: 50 INJECTION INTRAMUSCULAR; INTRAVENOUS at 10:37

## 2019-07-26 ASSESSMENT — PULMONARY FUNCTION TESTS
PIF_VALUE: 19
PIF_VALUE: 18
PIF_VALUE: 19
PIF_VALUE: 18
PIF_VALUE: 19
PIF_VALUE: 1
PIF_VALUE: 0
PIF_VALUE: 18
PIF_VALUE: 14
PIF_VALUE: 18
PIF_VALUE: 7
PIF_VALUE: 18
PIF_VALUE: 17
PIF_VALUE: 2
PIF_VALUE: 17
PIF_VALUE: 18
PIF_VALUE: 14
PIF_VALUE: 4
PIF_VALUE: 14
PIF_VALUE: 18
PIF_VALUE: 3
PIF_VALUE: 19
PIF_VALUE: 4
PIF_VALUE: 3
PIF_VALUE: 17
PIF_VALUE: 18
PIF_VALUE: 18
PIF_VALUE: 1
PIF_VALUE: 2
PIF_VALUE: 14
PIF_VALUE: 19
PIF_VALUE: 19
PIF_VALUE: 14
PIF_VALUE: 17
PIF_VALUE: 17
PIF_VALUE: 18
PIF_VALUE: 19
PIF_VALUE: 18
PIF_VALUE: 18
PIF_VALUE: 1
PIF_VALUE: 1
PIF_VALUE: 18
PIF_VALUE: 19
PIF_VALUE: 19
PIF_VALUE: 18
PIF_VALUE: 16
PIF_VALUE: 1
PIF_VALUE: 18
PIF_VALUE: 23
PIF_VALUE: 18
PIF_VALUE: 1
PIF_VALUE: 14
PIF_VALUE: 14
PIF_VALUE: 2
PIF_VALUE: 14

## 2019-07-26 ASSESSMENT — ENCOUNTER SYMPTOMS: SHORTNESS OF BREATH: 0

## 2019-07-26 ASSESSMENT — PAIN SCALES - GENERAL
PAINLEVEL_OUTOF10: 0

## 2019-07-26 ASSESSMENT — PAIN DESCRIPTION - DESCRIPTORS: DESCRIPTORS: ACHING

## 2019-07-26 NOTE — PROGRESS NOTES
Pt arrived to PACU from OR. Pt sleeping on 4l/nc with oral airway in place. Left shoulder dressing C/D/I. Fingers warm. Cap refill WNL. +pulses noted.

## 2019-07-26 NOTE — PROGRESS NOTES
Pt more awake, alert and oriented, 92% on RA. Pt denies pain. Pt able to wiggle fingers minimally, states he feels pressure but not full sensation. Radial pulse palpable. Dressing to left shoulder is CDI. Ice applied.  Electronically signed by Rica Burleson RN on 7/26/2019 at 1:20 PM

## 2019-07-26 NOTE — BRIEF OP NOTE
Brief Postoperative Note  ______________________________________________________________    Patient: Eric Jimenez  YOB: 1955  MRN: [id number]  Date of Procedure: 7/26/2019    Pre-Op Diagnosis: LEFT SHOULDER PARTIAL ROTATOR CUFF TEAR    Post-Op Diagnosis: Same       Procedure(s):  LEFT SHOULDER ARTHROSCOPY, SUBACROMIAL DECOMPRESSION, ROTATOR CUFF REPAIR    Anesthesia: General + block    Surgeon(s):  Tae Mar MD    Assistant: Leti Acuña    Estimated Blood Loss (mL): less than 50     Complications: None    Specimens:   * No specimens in log *    Implants:  Implant Name Type Inv.  Item Serial No.  Lot No. LRB No. Used   ANCHOR SWVLK TENODESIS 5.5X24.5MM - [id number] Fastener ANCHOR SWVLK TENODESIS 5.5X24.5MM [id number] [employer] [id number] Left 1         Drains: * No LDAs found *          Tae Mar MD  Date: 7/26/2019  Time: 11:21 AM

## 2019-07-26 NOTE — PROGRESS NOTES
Pt continues to be sleepy, awakens to voice. Denies pain. Continues on 2L O2 at 95% Will monitor.  Electronically signed by Lisa Bowles RN on 7/26/2019 at 1:02 PM

## 2019-07-26 NOTE — TELEPHONE ENCOUNTER
Patient is scheduled for dental cleaning on 7/29/2019 after his post op appt. He would like to confirm that it is \"ok\" for him to have a dental cleaning after surgery?     Please call patient:  [phone]

## 2019-07-26 NOTE — ANESTHESIA PRE PROCEDURE
01/01/2013    CALCIUM 9.7 06/06/2019    BILITOT 1.20 01/01/2013    ALKPHOS 48 01/01/2013    AST 26 01/01/2013    ALT 20 01/01/2013     BMP    Lab Results   Component Value Date     06/06/2019    K 3.5 06/06/2019    CL 96 06/06/2019    CO2 30 06/06/2019    BUN 18 06/06/2019    CREATININE 0.9 06/06/2019    CALCIUM 9.7 06/06/2019    GFRAA >60 06/06/2019    GFRAA >60 01/04/2013    LABGLOM >60 06/06/2019    GLUCOSE 175 06/06/2019     POCGlucose  No results for input(s): GLUCOSE in the last 72 hours.    Coags    Lab Results   Component Value Date    PROTIME 12.4 03/29/2017    INR 1.10 03/29/2017    APTT 27.5 82/50/2264     HCG (If Applicable) No results found for: PREGTESTUR, PREGSERUM, HCG, HCGQUANT   ABGs No results found for: PHART, PO2ART, YOA5FEK, MUM2LPE, BEART, B6IKSGQF   Type & Screen (If Applicable)  No results found for: LABABO, LABRH                         BMI: Wt Readings from Last 3 Encounters:       NPO Status:   Date of last liquid consumption: 07/25/19   Time of last liquid consumption: 0000   Date of last solid food consumption: 07/25/19      Time of last solid consumption: 0000       Anesthesia Evaluation  Patient summary reviewed  Airway: Mallampati: III  TM distance: >3 FB   Neck ROM: full  Mouth opening: > = 3 FB Dental:          Pulmonary:       (-) COPD, asthma and shortness of breath                           Cardiovascular:  Exercise tolerance: good (>4 METS),   (+) hypertension:, hyperlipidemia    (-) valvular problems/murmurs, past MI, CAD, CABG/stent, dysrhythmias and  angina    ECG reviewed                        Neuro/Psych:   (+) headaches:,    (-) seizures, TIA and CVA           GI/Hepatic/Renal:        (-) GERD, PUD, liver disease and no renal disease       Endo/Other:    (+) Diabetes, : arthritis:., .                 Abdominal:           Vascular:                                        Anesthesia Plan      general     ASA 3     (I discussed with the patient the risks and benefits of PIV, general anesthesia, IV Narcotics, PACU. All questions were answered the patient agrees with the plan.)  Induction: intravenous. Anesthetic plan and risks discussed with patient. Plan discussed with CRNA. This pre-anesthesia assessment may be used as a history and physical.    DOS STAFF ADDENDUM:    Pt seen and examined, chart reviewed (including anesthesia, drug and allergy history). No interval changes to history and physical examination. Anesthetic plan, risks, benefits, alternatives, and personnel involved discussed with patient. Patient verbalized an understanding and agrees to proceed.       Denia Gallego MD  July 26, 2019  7:37 AM

## 2019-07-29 ENCOUNTER — OFFICE VISIT (OUTPATIENT)
Dept: ORTHOPEDIC SURGERY | Age: 64
End: 2019-07-29

## 2019-07-29 VITALS — RESPIRATION RATE: 17 BRPM | HEART RATE: 68 BPM | BODY MASS INDEX: 34.97 KG/M2 | HEIGHT: 69 IN | WEIGHT: 236.11 LBS

## 2019-07-29 DIAGNOSIS — M75.112 NONTRAUMATIC INCOMPLETE TEAR OF LEFT ROTATOR CUFF: Primary | ICD-10-CM

## 2019-07-29 PROCEDURE — 99024 POSTOP FOLLOW-UP VISIT: CPT | Performed by: ORTHOPAEDIC SURGERY

## 2019-07-29 NOTE — FLOWSHEET NOTE
Pt denies any questions. Pt was not informed of a delay in surgery. He was scheduled for 0730 and did not go to surgery until 1000. Otherwise, he had a good experience.

## 2019-07-29 NOTE — PROGRESS NOTES
Shoulder Arthroscopy Follow-up  Leonie Glover is here for follow up after left shoulder arthroscopic surgery. Surgery date was 7/26/19. Findings at surgery: small high-grade partial rotator cuff tear. Pain is controlled with current analgesics. Medication(s) being used: Oxycodone, percocet, Methadone. The patient's pain is rated at 4/10. The patient denies fever, wound drainage, increasing redness, pus, increasing swelling. Post op problems reported: none. He has been in a sling and non-weightbearing as instructed. Physical Examination:  Pulse 68   Resp 17   Ht 5' 9.02\" (1.753 m)   Wt 236 lb 1.8 oz (107.1 kg)   BMI 34.85 kg/m²    Patient is awake, alert, and in no acute distress. The incisions are clean, dry, no drainage. There is no warmth, erythema, or purulent drainage over the incisions. Sensation is intact to light touch in the axillary, median, radial, and ulnar nerve distribution bilaterally. The EPL, FPL, and interossei are grossly intact bilaterally. The Bilateral upper extremities are warm and well-perfused with brisk capillary refill. Assessment:   3 days status post left shoulder arthroscopy, SAD, RTC repair      Plan: We reviewed intra-operative arthroscopy photos. Start physical therapy. A physical therapy order was placed and postoperative physical therapy protocol was provided to the patient to give to the physical therapist.    The patient may not advance their weight-bearing as tolerated. Sling for 4 weeks total after surgery. The patient should use the cold pad or apply ice to the shoulder continuously for 3 days after surgery. Then use cold pad or ice 20-30 minutes only 3-5 times per day as needed. He is in chronic pain management. No NSAIDs. Patient may start showering now. No baths or soaks. Apply band-aids to the incisions if the Tegaderms fall off. No driving while on pain medication if it causes impairment.   No driving while

## 2019-08-05 ENCOUNTER — HOSPITAL ENCOUNTER (OUTPATIENT)
Dept: PHYSICAL THERAPY | Age: 64
Setting detail: THERAPIES SERIES
Discharge: HOME | End: 2019-08-05
Payer: COMMERCIAL

## 2019-08-05 PROCEDURE — 97110 THERAPEUTIC EXERCISES: CPT

## 2019-08-05 PROCEDURE — 97162 PT EVAL MOD COMPLEX 30 MIN: CPT

## 2019-08-05 PROCEDURE — 97140 MANUAL THERAPY 1/> REGIONS: CPT

## 2019-08-05 NOTE — PLAN OF CARE
Outpatient Physical Therapy  [] [facility]    Phone: [phone]   Fax: [phone]   [x] [facility]  Phone: [phone]              Fax: [phone]  [] [place]   Phone: [phone]   Fax: [phone]     To: Referring Practitioner: Dr Chrissy Gu      Patient: Yimi Jimenez   : 1955   MRN: [id number]  Evaluation Date: 2019      Diagnosis Information:  · Diagnosis: Nontraumatic incomplete tear of left rotator cuff (M75.112 ICD-10-CM)   · Treatment Diagnosis: Left shoulder and UE dysfunction s/p rotator cuff repair and SAD     Physical Therapy Certification/Re-Certification Form  Dear Shakira Talbot  The following patient has been evaluated for physical therapy services and for therapy to continue, Medicare requires monthly physician review of the treatment plan. Please review the attached evaluation and/or summary of the patient's plan of care, and verify that you agree therapy should continue by signing the attached document and sending it back to our office. Plan of Care/Treatment to date:  [x] Therapeutic Exercise    [x] Modalities:  [x] Therapeutic Activity     [] Ultrasound  [x] Electrical Stimulation  [] Gait Training      [] Cervical Traction [] Lumbar Traction  [x] Neuromuscular Re-education    [x] Cold/hotpack [] Iontophoresis   [x] Instruction in HEP     Other:  [x] Manual Therapy      []             [] Aquatic Therapy      []           ? Frequency/Duration:  # Days per week: [] 1 day # Weeks: [] 1 week [] 5 weeks     [x] 2 days? [] 2 weeks [] 6 weeks     [] 3 days   [] 3 weeks [] 7 weeks     [] 4 days   [] 4 weeks [x] 10-12 weeks    Rehab Potential: [] Excellent [x] Good [] Fair  [] Poor       Electronically signed by:  Karey Hammonds PT      If you have any questions or concerns, please don't hesitate to call.   Thank you for your referral.      Physician Signature:________________________________Date:__________________  By signing above, therapists plan is approved by

## 2019-08-08 ENCOUNTER — HOSPITAL ENCOUNTER (OUTPATIENT)
Dept: PHYSICAL THERAPY | Age: 64
Setting detail: THERAPIES SERIES
Discharge: HOME | End: 2019-08-08
Payer: COMMERCIAL

## 2019-08-08 PROCEDURE — 97140 MANUAL THERAPY 1/> REGIONS: CPT

## 2019-08-08 PROCEDURE — 97110 THERAPEUTIC EXERCISES: CPT

## 2019-08-08 NOTE — FLOWSHEET NOTE
Physical Therapy Daily Treatment Note  Date:  2019    Patient Name:  Good Lim    :  1955  MRN: [id number]  Restrictions/Precautions:    Pertinent Medical History:  Medical/Treatment Diagnosis Information:  · Diagnosis: Nontraumatic incomplete tear of left rotator cuff (M75.112 ICD-10-CM)  · Treatment Diagnosis: Left shoulder and UE dysfunction s/p rotator cuff repair and SAD  Insurance/Certification information:  PT Insurance Information: medicare  Physician Information:  Referring Practitioner: Dr Katie Tanner of care signed (Y/N):  Routed 19  Visit# / total visits: 2  Pain level: 3/10     G-Code (if applicable):      Date / Visit # G-Code Applied:  /       Progress Note: []  Yes  [x]  No  Next due by: Visit #10      History of Injury:Left shoulder pain started without injury ~ 2019, he had a trial of PT with little benefit, 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 Has not taken pain meds today. Objective:  Observation:   Test measurements:      Exercises:  Exercise/Equipment Resistance/Repetitions Other comments                                      Mat ex                              HEP      Codman's 4 way  scap retraction 30 x ea  5\" 20 x 8/5/19, 19 corrected technique   Elbow curls 30 x 8/8/19                     Other Therapeutic Activities:      Home Exercise Program:    19 The patient demonstrated good tolerance to and understanding of the HEP. Written instructions have been issued. Manual Treatments:  15 min  PROM left shld. With gentle end range stretch flex. , er, IR and abd  DTM/ MFR to left scap.  muscles    Modalities:

## 2019-08-12 ENCOUNTER — HOSPITAL ENCOUNTER (OUTPATIENT)
Dept: PHYSICAL THERAPY | Age: 64
Setting detail: THERAPIES SERIES
Discharge: HOME | End: 2019-08-12
Payer: COMMERCIAL

## 2019-08-12 PROCEDURE — 97110 THERAPEUTIC EXERCISES: CPT

## 2019-08-12 PROCEDURE — 97140 MANUAL THERAPY 1/> REGIONS: CPT

## 2019-08-12 NOTE — FLOWSHEET NOTE
ea  5\" 20 x 8/5/19, 8/8/19 corrected technique   Elbow curls 30 x 8/8/19 , 8/12                    Other Therapeutic Activities:      Home Exercise Program:    8/8/19 The patient demonstrated good tolerance to and understanding of the HEP. Written instructions have been issued. Manual Treatments:  20 min  PROM left shld. With gentle end range stretch flex. , er, IR and abd  DTM/ MFR to left scap. muscles    Modalities:    CP 10 min to left shld.      Timed Code Treatment Minutes:  30    Total Treatment Minutes:  45    Assessment  [x] Patient tolerated treatment well [] Patient limited by fatigue  [] Patient limited by pain  [] Patient limited by other medical complications  [] Other:         Prognosis: [x] Good [] Fair  [] Poor    Patient Requires Follow-up: [x] Yes  [] No    Plan:   [x] Continue per plan of care [] Alter current plan (see comments)  [x] Plan of care initiated [] Hold pending MD visit [] Discharge    Plan for Next Session:  Progress per Dr Catarina Villaseñor protocol     Electronically signed by:  Denia Limon PT

## 2019-08-13 ENCOUNTER — OFFICE VISIT (OUTPATIENT)
Dept: ORTHOPEDIC SURGERY | Age: 64
End: 2019-08-13

## 2019-08-13 VITALS — BODY MASS INDEX: 34.96 KG/M2 | RESPIRATION RATE: 16 BRPM | WEIGHT: 236 LBS | HEIGHT: 69 IN

## 2019-08-13 DIAGNOSIS — M75.112 NONTRAUMATIC INCOMPLETE TEAR OF LEFT ROTATOR CUFF: Primary | ICD-10-CM

## 2019-08-13 PROCEDURE — 99024 POSTOP FOLLOW-UP VISIT: CPT | Performed by: ORTHOPAEDIC SURGERY

## 2019-08-13 NOTE — PROGRESS NOTES
Shoulder Arthroscopy Follow-up  Jessie Cano is here for follow up after left shoulder arthroscopic surgery. Surgery date was 7/26/19. Findings at surgery: small high-grade partial rotator cuff tear. Pain is controlled with current analgesics. Medication(s) being used: Oxycodone, percocet, Methadone. The patient's pain is rated at 5/10. He has been in a sling and non-weightbearing as instructed. He has been to PT at [facility].      Physical Examination:  Resp 16   Ht 5' 9\" (1.753 m)   Wt 236 lb (107 kg)   BMI 34.85 kg/m²    Patient is awake, alert, and in no acute distress. The incisions are healing. Sensation is intact to light touch in the axillary, median, radial, and ulnar nerve distribution bilaterally. The EPL, FPL, and interossei are grossly intact bilaterally. The Bilateral upper extremities are warm and well-perfused with brisk capillary refill. Assessment:   2 weeks status post left shoulder arthroscopy, SAD, RTC repair      Plan:   Sutures were removed. Steri-strips and mastisol were applied. Patient may start taking baths or soaks at 4 weeks postop    Continue physical therapy. The patient may advance their weight-bearing as tolerated at 4 weeks postop. Sling for 4 weeks total after surgery. Refill pain medications as needed. No NSAIDs. Return to office at the 6 week postop time period for evaluation of progress or prn if problems.

## 2019-08-14 ENCOUNTER — HOSPITAL ENCOUNTER (OUTPATIENT)
Dept: PHYSICAL THERAPY | Age: 64
Setting detail: THERAPIES SERIES
Discharge: HOME | End: 2019-08-14
Payer: COMMERCIAL

## 2019-08-14 PROCEDURE — 97110 THERAPEUTIC EXERCISES: CPT

## 2019-08-14 PROCEDURE — 97140 MANUAL THERAPY 1/> REGIONS: CPT

## 2019-08-14 NOTE — FLOWSHEET NOTE
Physical Therapy Daily Treatment Note  Date:  2019    Patient Name:  Kyra Habermann    :  1955  MRN: [id number]  Restrictions/Precautions:    Pertinent Medical History:  Medical/Treatment Diagnosis Information:  · Diagnosis: Nontraumatic incomplete tear of left rotator cuff (M75.112 ICD-10-CM)  · Treatment Diagnosis: Left shoulder and UE dysfunction s/p rotator cuff repair and SAD  Insurance/Certification information:  PT Insurance Information: medicare  Physician Information:  Referring Practitioner: Dr Marcus Jiang of care signed (Y/N):  Routed 19  Visit# / total visits: 3  Pain level: 5/10     G-Code (if applicable):      Date / Visit # G-Code Applied:  /       Progress Note: []  Yes  [x]  No  Next due by: Visit #10      History of Injury:Left shoulder pain started without injury ~ 2019, he had a trial of PT with little benefit, 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 Has not taken pain meds today. 19 10 min late  Pt reports minimal left shoulder pain last night. 19 15 min late. Patient reports shoulder has been sore last few days for some reason.      Objective:  Observation:   Test measurements:    ROM:  Date      Shldr flexion    Shldr abd  Shldr IR         Shldr ER   A P A P A P A P   Eval 19  0-95  0-75  0-40  0-30    19  0-135  0-110  0-50  0-60                           Strength:  Date Shoulder flexion Shoulder abduction Shoulder IR Shoulder  ER Bicep   Eval                            Exercises:  Exercise/Equipment Resistance/Repetitions Other comments                                      Mat ex     Seated elbow

## 2019-08-19 ENCOUNTER — HOSPITAL ENCOUNTER (OUTPATIENT)
Dept: PHYSICAL THERAPY | Age: 64
Setting detail: THERAPIES SERIES
Discharge: HOME | End: 2019-08-19
Payer: COMMERCIAL

## 2019-08-21 ENCOUNTER — HOSPITAL ENCOUNTER (OUTPATIENT)
Dept: PHYSICAL THERAPY | Age: 64
Setting detail: THERAPIES SERIES
Discharge: HOME | End: 2019-08-21
Payer: COMMERCIAL

## 2019-08-21 PROCEDURE — G0283 ELEC STIM OTHER THAN WOUND: HCPCS

## 2019-08-21 PROCEDURE — 97110 THERAPEUTIC EXERCISES: CPT

## 2019-08-21 PROCEDURE — 97140 MANUAL THERAPY 1/> REGIONS: CPT

## 2019-08-21 NOTE — FLOWSHEET NOTE
Physical Therapy Daily Treatment Note  Date:  2019    Patient Name:  Jr Beckford    :  1955  MRN: [id number]  Restrictions/Precautions:    Pertinent Medical History:  Medical/Treatment Diagnosis Information:  · Diagnosis: Nontraumatic incomplete tear of left rotator cuff (M75.112 ICD-10-CM)  · Treatment Diagnosis: Left shoulder and UE dysfunction s/p rotator cuff repair and SAD  Insurance/Certification information:  PT Insurance Information: medicare  Physician Information:  Referring Practitioner: Dr Garza Asp of care signed (Y/N):  Routed 19  Visit# / total visits: 4  Pain level: 6/10     G-Code (if applicable):      Date / Visit # G-Code Applied:  /       Progress Note: []  Yes  [x]  No  Next due by: Visit #10      History of Injury:Left shoulder pain started without injury ~ 2019, he had a trial of PT with little benefit, 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 Has not taken pain meds today. 19 10 min late  Pt reports minimal left shoulder pain last night. 19 15 min late. Patient reports shoulder has been sore last few days for some reason. 19  10 min late. Patient reports shoulder had been a more sore since the weekend.     Objective:  Observation:   Test measurements:    ROM:  Date      Shldr flexion    Shldr abd  Shldr IR         Shldr ER   A P A P A P A P   Eval 19  0-95  0-75  0-40  0-30    19  0-135  0-110  0-50  0-60                           Strength:  Date Shoulder flexion Shoulder abduction Shoulder IR Shoulder  ER Bicep   Eval                            Exercises:  Exercise/Equipment

## 2019-08-26 ENCOUNTER — HOSPITAL ENCOUNTER (OUTPATIENT)
Dept: PHYSICAL THERAPY | Age: 64
Setting detail: THERAPIES SERIES
Discharge: HOME | End: 2019-08-26
Payer: COMMERCIAL

## 2019-08-26 PROCEDURE — G0283 ELEC STIM OTHER THAN WOUND: HCPCS

## 2019-08-26 PROCEDURE — 97110 THERAPEUTIC EXERCISES: CPT

## 2019-08-26 PROCEDURE — 97140 MANUAL THERAPY 1/> REGIONS: CPT

## 2019-08-26 NOTE — FLOWSHEET NOTE
Physical Therapy Daily Treatment Note  Date:  2019    Patient Name:  Georgina Tran    :  1955  MRN: [id number]  Restrictions/Precautions:    Pertinent Medical History:  Medical/Treatment Diagnosis Information:  · Diagnosis: Nontraumatic incomplete tear of left rotator cuff (M75.112 ICD-10-CM)  · Treatment Diagnosis: Left shoulder and UE dysfunction s/p rotator cuff repair and SAD  Insurance/Certification information:  PT Insurance Information: medicare  Physician Information:  Referring Practitioner: Dr Joshua Goodman of care signed (Y/N):  Routed 19  Visit# / total visits: 6 (corected count)  Pain level: 5/10     G-Code (if applicable):      Date / Visit # G-Code Applied:  /       Progress Note: []  Yes  [x]  No  Next due by: Visit #10      History of Injury:Left shoulder pain started without injury ~ 2019, he had a trial of PT with little benefit, 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 Has not taken pain meds today. 19 10 min late  Pt reports minimal left shoulder pain last night. 19 15 min late. Patient reports shoulder has been sore last few days for some reason. 19  10 min late. Patient reports shoulder had been a more sore since the weekend. 19: Pt reports that he  feels like his shoulder is getting better. Exercises don't give him any problem.     Objective:  Observation:   Test measurements:    ROM:  Date      Shldr flexion    Shldr abd  Shldr IR         Shldr ER   A P A P A P A P   Eval 19  0-95  0-75  0-40  0-30    19  0-135  0-110  0-50  0-60                           Strength:  Date Shoulder flexion

## 2019-08-28 ENCOUNTER — HOSPITAL ENCOUNTER (OUTPATIENT)
Dept: PHYSICAL THERAPY | Age: 64
Setting detail: THERAPIES SERIES
Discharge: HOME | End: 2019-08-28
Payer: COMMERCIAL

## 2019-08-28 PROCEDURE — G0283 ELEC STIM OTHER THAN WOUND: HCPCS

## 2019-08-28 PROCEDURE — 97140 MANUAL THERAPY 1/> REGIONS: CPT

## 2019-08-28 PROCEDURE — 97110 THERAPEUTIC EXERCISES: CPT

## 2019-09-03 ENCOUNTER — HOSPITAL ENCOUNTER (OUTPATIENT)
Dept: PHYSICAL THERAPY | Age: 64
Setting detail: THERAPIES SERIES
Discharge: HOME | End: 2019-09-03
Payer: COMMERCIAL

## 2019-09-03 NOTE — FLOWSHEET NOTE
Physical Therapy  Cancellation/No-show Note  Patient Name:  Leonidas Baeza  :  1955   Date:  9/3/2019  Cancelled visits to date: 2  No-shows to date: 0    For today's appointment patient:  [x]  Cancelled  []  Rescheduled appointment  []  No-show     Reason given by patient:  []  Patient ill  []  Conflicting appointment  []  No transportation    []  Conflict with work  [x]  No reason given  []  Other:     Comments:      Electronically signed by:  Michelle Pandya PTA

## 2019-09-04 ENCOUNTER — HOSPITAL ENCOUNTER (OUTPATIENT)
Dept: PHYSICAL THERAPY | Age: 64
Setting detail: THERAPIES SERIES
Discharge: HOME | End: 2019-09-04
Payer: COMMERCIAL

## 2019-09-04 PROCEDURE — G0283 ELEC STIM OTHER THAN WOUND: HCPCS

## 2019-09-04 PROCEDURE — 97140 MANUAL THERAPY 1/> REGIONS: CPT

## 2019-09-04 PROCEDURE — 97110 THERAPEUTIC EXERCISES: CPT

## 2019-09-04 NOTE — FLOWSHEET NOTE
Physical Therapy Daily Treatment Note  Date:  2019    Patient Name:  Juan Mahmood    :  1955  MRN: [id number]  Restrictions/Precautions:    Pertinent Medical History:  Medical/Treatment Diagnosis Information:  · Diagnosis: Nontraumatic incomplete tear of left rotator cuff (M75.112 ICD-10-CM)  · Treatment Diagnosis: Left shoulder and UE dysfunction s/p rotator cuff repair and SAD  Insurance/Certification information:  PT Insurance Information: medicare  Physician Information:  Referring Practitioner: Dr Darrin Mckay of care signed (Y/N):  Routed 19  Visit# / total visits: 8 (corected count)  Pain level: 4/10     G-Code (if applicable):      Date / Visit # G-Code Applied:  /       Progress Note: []  Yes  [x]  No  Next due by: Visit #10      History of Injury:Left shoulder pain started without injury ~ 2019, he had a trial of PT with little benefit, 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 Has not taken pain meds today. 19 10 min late  Pt reports minimal left shoulder pain last night. 19 15 min late. Patient reports shoulder has been sore last few days for some reason. 19  10 min late. Patient reports shoulder had been a more sore since the weekend. 19: Pt reports that he  feels like his shoulder is getting better. Exercises don't give him any problem. 19; 15 min late Patient reports shoulder is doing ok. 19:  Patient reports shoulder is doing ok.     Objective:  Observation:   Test measurements:    ROM:  Date      Shldr flexion    Shldr abd  Shldr IR         Shldr ER   A P A P A P A P   Eval 19  0-95  0-75

## 2019-09-05 ENCOUNTER — APPOINTMENT (OUTPATIENT)
Dept: PHYSICAL THERAPY | Age: 64
End: 2019-09-05
Payer: COMMERCIAL

## 2019-09-10 ENCOUNTER — HOSPITAL ENCOUNTER (OUTPATIENT)
Dept: PHYSICAL THERAPY | Age: 64
Setting detail: THERAPIES SERIES
Discharge: HOME | End: 2019-09-10
Payer: COMMERCIAL

## 2019-09-10 PROCEDURE — 97110 THERAPEUTIC EXERCISES: CPT

## 2019-09-10 PROCEDURE — G0283 ELEC STIM OTHER THAN WOUND: HCPCS

## 2019-09-10 PROCEDURE — 97140 MANUAL THERAPY 1/> REGIONS: CPT

## 2019-09-10 NOTE — FLOWSHEET NOTE
Session:  ADD shld isometrics to HEP                                           Progress per Dr Olvin Loza protocol     Electronically signed by:  Amalia Hartman, PT

## 2019-09-12 ENCOUNTER — HOSPITAL ENCOUNTER (OUTPATIENT)
Dept: PHYSICAL THERAPY | Age: 64
Setting detail: THERAPIES SERIES
Discharge: HOME | End: 2019-09-12
Payer: COMMERCIAL

## 2019-09-12 PROCEDURE — G0283 ELEC STIM OTHER THAN WOUND: HCPCS

## 2019-09-12 PROCEDURE — 97110 THERAPEUTIC EXERCISES: CPT

## 2019-09-12 PROCEDURE — 97140 MANUAL THERAPY 1/> REGIONS: CPT

## 2019-09-12 NOTE — FLOWSHEET NOTE
possibly from therapy. Objective:  Observation:   Test measurements:    ROM: Left shoulder   Date      Shldr flexion    Shldr abd  Shldr IR         Shldr ER   A P A P A P A P   Eval 8/5/19  0-95  0-75  0-40  0-30    8/12/19  0-135  0-110  0-50  0-60   9/10/19 122 141 99 132 50  55 65                                      Strength:  Date Shoulder flexion Shoulder abduction Shoulder IR Shoulder  ER Bicep   Eval        9/10/19 3/5 3-/5 4/5 4-/5 4/5                               Exercises:     6 weeks post op. 9/5/19      10 weeks post op. 10/3/19  Exercise/Equipment Resistance/Repetitions Other comments   Nu step - arms only  8/26/19   UBE L1 3 min    Wall slides ADD    Squires                         Mat ex     Seated elbow isotonics flex/ ext  1-2 pounds   Shoulder isometrics  Flex 5\" 15 x    Right SL'ing Squires Left shoulder flex 30 x     Can press ups supine                         incline 20 x  20 x    Table slides  Resume             HEP      Codman's 4 way  scap retraction 30 x ea  5\" 20 x 8/5/19, 8/8/19 corrected technique 8/14   Elbow curls 30 x 8/8/19 , 8/12,8/14   Isometric shld IR/ ER                            abd / Flex                          5\" 15 x each 9/12/19                Other Therapeutic Activities:      Home Exercise Program:    9/12/19 The patient demonstrated good tolerance to and understanding of the HEP. Written instructions have been issued. Manual Treatments:  10 min min  PROM left shld. With gentle end range stretch flex. , er, IR and abd  DTM/ MFR to left scap. muscles    Modalities:     2 ch Hi volt  with CP 15 min to left shld.      Timed Code Treatment Minutes:  25    Total Treatment Minutes:  50    Assessment  [x] Patient tolerated treatment well [] Patient limited by fatigue  [] Patient limited by pain  [] Patient limited by other medical complications  [] Other:         Prognosis: [x] Good [] Fair  [] Poor    Patient Requires Follow-up: [x] Yes  [] No    Plan:   [x] Continue per plan of care [] Alter current plan (see comments)  [] Plan of care initiated [] Hold pending MD visit [] Discharge    Plan for Next Session:  Review HEP from 9/12/19                                           Progress per Dr Tatiana Steiner protocol     Electronically signed by:  Trinidad Bang PT

## 2019-09-12 NOTE — PLAN OF CARE
Outpatient Physical Therapy  [] [facility]    Phone: [phone]   Fax: [phone]   [x] [facility]  Phone: [phone]   Fax: [phone]  [] [facility]              Phone: [phone]   Fax: [phone]     Physical Therapy Progress/Discharge Note  Date: 2019        Patient Name:  Telma Melendrez    :  1955  MRN: [id number]  Medical/Treatment Diagnosis Information:  · Diagnosis: Nontraumatic incomplete tear of left rotator cuff (M75.112 ICD-10-CM)  · Treatment Diagnosis: Left shoulder and UE dysfunction s/p rotator cuff repair and SAD  Insurance/Certification information:  PT Insurance Information: medicare  Physician Information:  Referring Practitioner: Dr Jessika Jacinto  Visit# / total visits:  10  Pain level: 0-5/10     G-Code (if applicable):      Date G-Code Applied:          Time Period for Report: 19 to 19   Cancels/No-shows to date:  2 can    Plan of Care/Treatment to date:  [x] Therapeutic Exercise    [x] Modalities:  [x] Therapeutic Activity     [] Ultrasound  [x] Electrical Stimulation  [] Gait Training      [] Cervical Traction    [] Lumbar Traction  [x] Neuromuscular Re-education  [x] Cold/hotpack [] Iontophoresis  [x] Instruction in HEP      Other:  [x] Manual Therapy       []    [] Aquatic Therapy       []                    ? Significant Findings At Last Visit/Comments:    Subjective: 9/10/19 Patient reports he reached up to turn on a closet light and his shoulder has been more since that time.    19 15 Min late Pt reports his shoulder was more sore yesterday, possibly from therapy.              Objective:  Observation:  Test measurements:     ROM: Left shoulder   Date                    Shldr flexion             Shldr abd                Shldr IR                  Shldr ER    A P A P A P A P   Eval 19   0-95   0-75   0-40   0-30    19   0-135   0-110   0-50   0-60   9/10/19 122 141 99 132 50   55 65

## 2019-09-16 ENCOUNTER — OFFICE VISIT (OUTPATIENT)
Dept: ORTHOPEDIC SURGERY | Age: 64
End: 2019-09-16

## 2019-09-16 VITALS — BODY MASS INDEX: 34.94 KG/M2 | HEIGHT: 69 IN | RESPIRATION RATE: 16 BRPM | WEIGHT: 235.89 LBS

## 2019-09-16 DIAGNOSIS — M75.112 NONTRAUMATIC INCOMPLETE TEAR OF LEFT ROTATOR CUFF: Primary | ICD-10-CM

## 2019-09-16 PROCEDURE — 99024 POSTOP FOLLOW-UP VISIT: CPT | Performed by: ORTHOPAEDIC SURGERY

## 2019-09-23 ENCOUNTER — HOSPITAL ENCOUNTER (OUTPATIENT)
Dept: PHYSICAL THERAPY | Age: 64
Setting detail: THERAPIES SERIES
Discharge: HOME | End: 2019-09-23
Payer: COMMERCIAL

## 2019-09-23 PROCEDURE — 97140 MANUAL THERAPY 1/> REGIONS: CPT

## 2019-09-23 PROCEDURE — 97110 THERAPEUTIC EXERCISES: CPT

## 2019-09-25 ENCOUNTER — HOSPITAL ENCOUNTER (OUTPATIENT)
Dept: PHYSICAL THERAPY | Age: 64
Setting detail: THERAPIES SERIES
Discharge: HOME | End: 2019-09-25
Payer: COMMERCIAL

## 2019-09-25 PROCEDURE — 97140 MANUAL THERAPY 1/> REGIONS: CPT

## 2019-09-25 PROCEDURE — 97110 THERAPEUTIC EXERCISES: CPT

## 2019-09-30 ENCOUNTER — HOSPITAL ENCOUNTER (OUTPATIENT)
Dept: PHYSICAL THERAPY | Age: 64
Setting detail: THERAPIES SERIES
Discharge: HOME | End: 2019-09-30
Payer: COMMERCIAL

## 2019-09-30 PROCEDURE — 97140 MANUAL THERAPY 1/> REGIONS: CPT

## 2019-09-30 PROCEDURE — 97110 THERAPEUTIC EXERCISES: CPT

## 2019-10-02 ENCOUNTER — HOSPITAL ENCOUNTER (OUTPATIENT)
Dept: PHYSICAL THERAPY | Age: 64
Setting detail: THERAPIES SERIES
Discharge: HOME | End: 2019-10-02
Payer: COMMERCIAL

## 2019-10-02 PROCEDURE — 97110 THERAPEUTIC EXERCISES: CPT

## 2019-10-02 PROCEDURE — 97140 MANUAL THERAPY 1/> REGIONS: CPT

## 2019-10-07 ENCOUNTER — HOSPITAL ENCOUNTER (OUTPATIENT)
Dept: PHYSICAL THERAPY | Age: 64
Setting detail: THERAPIES SERIES
Discharge: HOME | End: 2019-10-07
Payer: COMMERCIAL

## 2019-10-09 ENCOUNTER — HOSPITAL ENCOUNTER (OUTPATIENT)
Dept: PHYSICAL THERAPY | Age: 64
Setting detail: THERAPIES SERIES
Discharge: HOME | End: 2019-10-09
Payer: COMMERCIAL

## 2019-10-09 PROCEDURE — 97140 MANUAL THERAPY 1/> REGIONS: CPT

## 2019-10-09 PROCEDURE — 97110 THERAPEUTIC EXERCISES: CPT

## 2019-11-18 ENCOUNTER — OFFICE VISIT (OUTPATIENT)
Dept: ORTHOPEDIC SURGERY | Age: 64
End: 2019-11-18
Payer: COMMERCIAL

## 2019-11-18 VITALS
BODY MASS INDEX: 34.94 KG/M2 | HEIGHT: 69 IN | WEIGHT: 235.89 LBS | SYSTOLIC BLOOD PRESSURE: 135 MMHG | DIASTOLIC BLOOD PRESSURE: 85 MMHG | RESPIRATION RATE: 16 BRPM

## 2019-11-18 DIAGNOSIS — M75.112 NONTRAUMATIC INCOMPLETE TEAR OF LEFT ROTATOR CUFF: Primary | ICD-10-CM

## 2019-11-18 PROCEDURE — 1036F TOBACCO NON-USER: CPT | Performed by: ORTHOPAEDIC SURGERY

## 2019-11-18 PROCEDURE — 4040F PNEUMOC VAC/ADMIN/RCVD: CPT | Performed by: ORTHOPAEDIC SURGERY

## 2019-11-18 PROCEDURE — G8484 FLU IMMUNIZE NO ADMIN: HCPCS | Performed by: ORTHOPAEDIC SURGERY

## 2019-11-18 PROCEDURE — 1123F ACP DISCUSS/DSCN MKR DOCD: CPT | Performed by: ORTHOPAEDIC SURGERY

## 2019-11-18 PROCEDURE — 99213 OFFICE O/P EST LOW 20 MIN: CPT | Performed by: ORTHOPAEDIC SURGERY

## 2019-11-18 PROCEDURE — G8417 CALC BMI ABV UP PARAM F/U: HCPCS | Performed by: ORTHOPAEDIC SURGERY

## 2019-11-18 PROCEDURE — 3017F COLORECTAL CA SCREEN DOC REV: CPT | Performed by: ORTHOPAEDIC SURGERY

## 2019-11-18 PROCEDURE — G8427 DOCREV CUR MEDS BY ELIG CLIN: HCPCS | Performed by: ORTHOPAEDIC SURGERY

## 2020-03-19 ENCOUNTER — OFFICE VISIT (OUTPATIENT)
Dept: ORTHOPEDIC SURGERY | Age: 65
End: 2020-03-19
Payer: COMMERCIAL

## 2020-03-19 VITALS — HEIGHT: 69 IN | BODY MASS INDEX: 34.94 KG/M2 | TEMPERATURE: 97.6 F | WEIGHT: 235.89 LBS

## 2020-03-19 PROCEDURE — 99213 OFFICE O/P EST LOW 20 MIN: CPT | Performed by: PHYSICIAN ASSISTANT

## 2020-03-19 PROCEDURE — 1123F ACP DISCUSS/DSCN MKR DOCD: CPT | Performed by: PHYSICIAN ASSISTANT

## 2020-03-19 PROCEDURE — 3017F COLORECTAL CA SCREEN DOC REV: CPT | Performed by: PHYSICIAN ASSISTANT

## 2020-03-19 PROCEDURE — G8417 CALC BMI ABV UP PARAM F/U: HCPCS | Performed by: PHYSICIAN ASSISTANT

## 2020-03-19 PROCEDURE — 20551 NJX 1 TENDON ORIGIN/INSJ: CPT | Performed by: PHYSICIAN ASSISTANT

## 2020-03-19 PROCEDURE — G8484 FLU IMMUNIZE NO ADMIN: HCPCS | Performed by: PHYSICIAN ASSISTANT

## 2020-03-19 PROCEDURE — 20610 DRAIN/INJ JOINT/BURSA W/O US: CPT | Performed by: PHYSICIAN ASSISTANT

## 2020-03-19 PROCEDURE — 4040F PNEUMOC VAC/ADMIN/RCVD: CPT | Performed by: PHYSICIAN ASSISTANT

## 2020-03-19 PROCEDURE — 1036F TOBACCO NON-USER: CPT | Performed by: PHYSICIAN ASSISTANT

## 2020-03-19 PROCEDURE — G8427 DOCREV CUR MEDS BY ELIG CLIN: HCPCS | Performed by: PHYSICIAN ASSISTANT

## 2020-05-28 ENCOUNTER — OFFICE VISIT (OUTPATIENT)
Dept: ORTHOPEDIC SURGERY | Age: 65
End: 2020-05-28
Payer: COMMERCIAL

## 2020-05-28 VITALS — TEMPERATURE: 98.4 F

## 2020-05-28 PROCEDURE — 1036F TOBACCO NON-USER: CPT | Performed by: ORTHOPAEDIC SURGERY

## 2020-05-28 PROCEDURE — G8427 DOCREV CUR MEDS BY ELIG CLIN: HCPCS | Performed by: ORTHOPAEDIC SURGERY

## 2020-05-28 PROCEDURE — 4040F PNEUMOC VAC/ADMIN/RCVD: CPT | Performed by: ORTHOPAEDIC SURGERY

## 2020-05-28 PROCEDURE — 3017F COLORECTAL CA SCREEN DOC REV: CPT | Performed by: ORTHOPAEDIC SURGERY

## 2020-05-28 PROCEDURE — 99213 OFFICE O/P EST LOW 20 MIN: CPT | Performed by: ORTHOPAEDIC SURGERY

## 2020-05-28 PROCEDURE — G8417 CALC BMI ABV UP PARAM F/U: HCPCS | Performed by: ORTHOPAEDIC SURGERY

## 2020-05-28 PROCEDURE — 1123F ACP DISCUSS/DSCN MKR DOCD: CPT | Performed by: ORTHOPAEDIC SURGERY

## 2020-05-28 NOTE — LETTER
[facility]  Surgery Scheduling Form:      DEMOGRAPHICS:                                                                                                             .    Patient Name:  Suzi Domínguez  Patient :  1955   Patient SS#:  [id number]    Patient Phone:  [phone] (home)  Alt. Patient Phone:    Patient Address:  [address] [address]    PCP:  Ambika Sánchez MD  Insurance:    Payor/Plan Subscr  Sex Relation Sub. Ins. ID Effective Group Num   [address]. [address] 1959 Female  [id number] 1/1/15 [id number]                                   [address]   2. MEDICARE - [place]* Karina Setting* 1955 Male  [id number] 1/1/15                                    [address]      DIAGNOSIS & PROCEDURE:                                                                                           .    Diagnosis:   Right Hip arthritis M16.11  Operation:  Right Hip injection under fluoro   Location:  [facility]  Surgeon:  Job Moody. Zaida Wright MD    SCHEDULING INFORMATION:                                                                                         .    Surgeon's Scheduling Instruction: Dr. Kodak Hilliard next week  Requested Date:  20 OR Time:  11:50am   Status: outpatient  PAT Required:    Comments:                      Carlos Hilliard MD      20  BILLING INFORMATION:                                                                                                  .    Procedure:       CPT Code Modifier

## 2020-06-02 ENCOUNTER — HOSPITAL ENCOUNTER (OUTPATIENT)
Dept: GENERAL RADIOLOGY | Age: 65
Discharge: HOME | End: 2020-06-02
Payer: COMMERCIAL

## 2020-06-02 ENCOUNTER — OFFICE VISIT (OUTPATIENT)
Dept: ORTHOPEDIC SURGERY | Age: 65
End: 2020-06-02
Payer: COMMERCIAL

## 2020-06-02 VITALS — WEIGHT: 233 LBS | HEIGHT: 69 IN | TEMPERATURE: 97.2 F | BODY MASS INDEX: 34.51 KG/M2

## 2020-06-02 PROCEDURE — 20611 DRAIN/INJ JOINT/BURSA W/US: CPT | Performed by: ORTHOPAEDIC SURGERY

## 2020-06-02 PROCEDURE — 2500000003 HC RX 250 WO HCPCS

## 2020-06-02 PROCEDURE — 6360000002 HC RX W HCPCS

## 2020-06-02 PROCEDURE — 76942 ECHO GUIDE FOR BIOPSY: CPT

## 2020-06-02 RX ORDER — LIDOCAINE HYDROCHLORIDE 10 MG/ML
5 INJECTION, SOLUTION INFILTRATION; PERINEURAL ONCE
Status: COMPLETED | OUTPATIENT
Start: 2020-06-02 | End: 2020-06-02

## 2020-06-02 RX ORDER — METHYLPREDNISOLONE ACETATE 40 MG/ML
80 INJECTION, SUSPENSION INTRA-ARTICULAR; INTRALESIONAL; INTRAMUSCULAR; SOFT TISSUE ONCE
Status: COMPLETED | OUTPATIENT
Start: 2020-06-02 | End: 2020-06-02

## 2020-06-02 RX ADMIN — LIDOCAINE HYDROCHLORIDE 5 ML: 10 INJECTION, SOLUTION INFILTRATION; PERINEURAL at 17:37

## 2020-06-02 RX ADMIN — METHYLPREDNISOLONE ACETATE 80 MG: 40 INJECTION, SUSPENSION INTRA-ARTICULAR; INTRALESIONAL; INTRAMUSCULAR; SOFT TISSUE at 17:37

## 2020-06-02 NOTE — PROGRESS NOTES
ULTRASOUND GUIDED HIP INJECTION    Ashely Blancas    June 2, 2020    Chief Complaint   Patient presents with    Hip Pain     RT Hip Injection UltraSound, Referred by Dr. Cleopatra Dodge       Temp 97.2 °F (36.2 °C)   Ht 5' 9\" (1.753 m)   Wt 233 lb (105.7 kg)   BMI 34.41 kg/m²     Amira Cote is a 59-year-old gentleman sent by Dr. Melany Estes for an ultrasound directed intra-articular steroid injection of his right hip. He is status post successful left total hip replacement performed by Dr. Melany Estes on 4/4/2017. He started having pain in his right hip more recently. The pain is in his groin and lateral hip region. It is an intermittent ache up to 6 at rest and up to 8 with activity such as getting out of a car or ambulating. Review of systems reviewed from patient history dated on  11/18/19  and available in the patient's chart under media tab. Physical Exam:      No Lesion of the skin is noted over the injection site    Impression:  right hip osteoarthritis. Plan:  1. Injection with cortisone was recommended into  right   hip joint using ultrasound visualization. He understands the risks and benefits of the injection and describes no potential allergies. Time out was performed to verify correct person, correct procedure, and correct site. 2. Ultrasound visualization was first performed utilizing the [facility] Ultrasound unit using an 5/2 MHz Curved Probe. finding the femoral neck head junction. Next the femoral artery and vein were visualized with ultrasound medial to the femoral head. The location of the needle insertion was determined well lateral to the neurovascular structures and the site was anesthetized with 3 mL of 1% Lidocaine. The site was then prepped with ChloraPrep. A sterile cover was placed over the transducer head and the femoral head neck junction once again visualized. A 20-gauge spinal needle was then introduced under ultrasound control to the head neck junction.  Once the needle was intracapsular the hip joint was injected with 2 mL  of 1% Lidocaine mixed with 2 ml of 40 mg Depo Medrol. Azam tolerated the procedure well and  did report 50% relief of  hip pain within 5 minutes of the injection. 3.  He is return to Dr. Santos [facility].       Rebecca Powers MD  6/2/2020

## 2020-06-03 NOTE — PROGRESS NOTES
Patient is a a 66-year-old male who follows up for 2 things first he had a left elbow injection for lateral epicondylitis on 3/19/2020 he states that this is doing better. He also now is complaining of bilateral trochanteric bursal pain and he is status post left total hip arthroplasty performed over 3 years ago on 4/4/2017. Finally he is experiencing new right hip and groin pain which Became about 1 week ago. There is no history of injury or surgery. He states he has muscle cramps and decreased range of motion of his right hip. He is status post a lumbar sacral fusion in 2007. Patient Active Problem List   Diagnosis    Back pain    Left hip pain    Trochanteric bursitis    Status post lumbar surgery    Status post lumbar spinal fusion    Postlaminectomy syndrome, lumbar region    DDD (degenerative disc disease), lumbosacral    Contusion of elbow    SVT (supraventricular tachycardia) (HCC)    Trochanteric bursitis of left hip    Elbow pain, right    Lateral epicondylitis of right elbow    Trochanteric bursitis, left hip    Primary osteoarthritis of left hip    Arthritis of left hip    Status post total hip replacement, left 4/4/17    Lateral epicondylitis of left elbow    Incomplete tear of left rotator cuff    History of claustrophobia     Prior to Visit Medications    Medication Sig Taking? Authorizing Provider   naloxone 4 MG/0.1ML LIQD nasal spray 1 spray by Nasal route as needed for Opioid Reversal  Nimisha Cristobal MD   butalbital-APAP-caffeine (FIORICET) -40 MG CAPS per capsule Take 1 capsule by mouth every 6 hours as needed for Headaches  Marquis David DO   oxyCODONE-acetaminophen (PERCOCET) 5-325 MG per tablet Take 1 tablet by mouth every 8 hours as needed for Pain. Historical Provider, MD   gabapentin (NEURONTIN) 800 MG tablet Take 800 mg by mouth 3 times daily.    Historical Provider, MD   oxybutynin (DITROPAN-XL) 10 MG extended release tablet   Historical Provider, MD

## 2020-06-08 ENCOUNTER — OFFICE VISIT (OUTPATIENT)
Dept: ORTHOPEDIC SURGERY | Age: 65
End: 2020-06-08
Payer: COMMERCIAL

## 2020-06-08 VITALS — TEMPERATURE: 97.2 F

## 2020-06-08 PROCEDURE — G8427 DOCREV CUR MEDS BY ELIG CLIN: HCPCS | Performed by: PHYSICIAN ASSISTANT

## 2020-06-08 PROCEDURE — G8417 CALC BMI ABV UP PARAM F/U: HCPCS | Performed by: PHYSICIAN ASSISTANT

## 2020-06-08 PROCEDURE — 99213 OFFICE O/P EST LOW 20 MIN: CPT | Performed by: PHYSICIAN ASSISTANT

## 2020-06-08 PROCEDURE — 20610 DRAIN/INJ JOINT/BURSA W/O US: CPT | Performed by: PHYSICIAN ASSISTANT

## 2020-06-08 PROCEDURE — 1036F TOBACCO NON-USER: CPT | Performed by: PHYSICIAN ASSISTANT

## 2020-06-08 PROCEDURE — 4040F PNEUMOC VAC/ADMIN/RCVD: CPT | Performed by: PHYSICIAN ASSISTANT

## 2020-06-08 PROCEDURE — 1123F ACP DISCUSS/DSCN MKR DOCD: CPT | Performed by: PHYSICIAN ASSISTANT

## 2020-06-08 PROCEDURE — 3017F COLORECTAL CA SCREEN DOC REV: CPT | Performed by: PHYSICIAN ASSISTANT

## 2020-07-06 ENCOUNTER — OFFICE VISIT (OUTPATIENT)
Dept: ORTHOPEDIC SURGERY | Age: 65
End: 2020-07-06
Payer: COMMERCIAL

## 2020-07-06 VITALS — BODY MASS INDEX: 34.51 KG/M2 | WEIGHT: 233 LBS | HEIGHT: 69 IN | TEMPERATURE: 97.7 F

## 2020-07-06 PROCEDURE — 99213 OFFICE O/P EST LOW 20 MIN: CPT | Performed by: PHYSICIAN ASSISTANT

## 2020-07-06 PROCEDURE — 4040F PNEUMOC VAC/ADMIN/RCVD: CPT | Performed by: PHYSICIAN ASSISTANT

## 2020-07-06 PROCEDURE — G8417 CALC BMI ABV UP PARAM F/U: HCPCS | Performed by: PHYSICIAN ASSISTANT

## 2020-07-06 PROCEDURE — 1123F ACP DISCUSS/DSCN MKR DOCD: CPT | Performed by: PHYSICIAN ASSISTANT

## 2020-07-06 PROCEDURE — 1036F TOBACCO NON-USER: CPT | Performed by: PHYSICIAN ASSISTANT

## 2020-07-06 PROCEDURE — G8427 DOCREV CUR MEDS BY ELIG CLIN: HCPCS | Performed by: PHYSICIAN ASSISTANT

## 2020-07-06 PROCEDURE — 3017F COLORECTAL CA SCREEN DOC REV: CPT | Performed by: PHYSICIAN ASSISTANT

## 2020-07-06 RX ORDER — TIZANIDINE 4 MG/1
4 TABLET ORAL 3 TIMES DAILY
COMMUNITY
Start: 2020-05-29

## 2020-07-06 NOTE — PROGRESS NOTES
History of present illness:   Mr. Anton Hager  is a pleasant 77 y.o. male with a PMH of lumbar surgery x 3 in 1979, 1980, and 2007 fusion L4-5 with Dr. Beth Dietz, chronic back pain, left TALIB, right hip OA, DM, HTN, and HLD kindly referred by Cecil Kayser PA-C for consultation regarding his LBP and right leg pain. He states he has had back pain for several years that is tolerable, but new leg pain began insidiously about 3 months ago. His pain has steadily increased since then. He rates his back pain 8/10 and leg pain 9/10. He describes the pain as throbbing and sharp that is worse with standing, walking, and rising from sitting and better with sitting and lying down. The leg pain radiates down the anterior aspect of his leg to his knee. He admits numbness and tingling in his right leg. He denies progressive weakness of his leg and denies bowel or bladder dysfunction. He states he can sit for an unlimited amount of timestand for a maximum 30 minutes. The pain moderately disrupts his sleep. He has tried right hip injection without relief. States he has had epidural injections in the distant past and it raised his glucose tremendously. He takes percocet, zanaflex and gabapentin. Physical examination:  Mr. Anastacio Higginbotham most recent vitals: There is no height or weight on file to calculate BMI. General exam:  He is well-developed and well-nourished, is in obvious pain and alert and oriented to person, place, and time. He demonstrates appropriate mood and affect. His skin is warm and dry. His gait is antalgic on the right without instability. Back:  He stands with slight lumbar flexion. His lumbar flexion, extension and lateral bending are moderately reduced with pain. He has mild tenderness over his lumbar spine without obvious muscle spasm. The skin over his lumbar spine has well healed surgical scar. Lower extremities:  He has 5/5 motor strength of bilateral lower extremities.  He has a negative straight leg raise, bilaterally. Deep tendon reflexes at knees and achilles are 2+. Sensation is intact to light touch L3 to S1 bilaterally. He has no clonus. Hip range of motion painless. Imaging:  I reviewed AP and lateral xray images of his lumbar spine from office today. They note good position of his L4-5 rods and screws with advanced degenerative changes throughout the lumbar spine. No evidence of fracture. Assessment:  13 years s/p lumbar fusion with Dr. Shubham Cage  Lumbar radiculopathy    Plan:  We discussed treatment options including observation, physical therapy, epidural injection and additional imaging. He would like to proceed with lumbar MRI with and without gadolinium. Not interested in [facility] as he had bad reaction in the past. Was given Abhinav exercises to do at home. We will call him with results.      Dorina Shepard PA-C

## 2020-07-07 ENCOUNTER — TELEPHONE (OUTPATIENT)
Dept: ORTHOPEDIC SURGERY | Age: 65
End: 2020-07-07

## 2020-07-21 ENCOUNTER — TELEPHONE (OUTPATIENT)
Dept: ORTHOPEDIC SURGERY | Age: 65
End: 2020-07-21

## 2020-07-22 DIAGNOSIS — M54.50 LUMBAR PAIN: ICD-10-CM

## 2020-07-22 DIAGNOSIS — M54.16 LUMBAR RADICULOPATHY: ICD-10-CM

## 2020-07-22 DIAGNOSIS — Z98.1 STATUS POST LUMBAR SPINAL FUSION: Primary | ICD-10-CM

## 2020-11-04 ENCOUNTER — OFFICE VISIT (OUTPATIENT)
Dept: ORTHOPEDIC SURGERY | Age: 65
End: 2020-11-04
Payer: COMMERCIAL

## 2020-11-04 VITALS — TEMPERATURE: 98.1 F

## 2020-11-04 PROCEDURE — 1123F ACP DISCUSS/DSCN MKR DOCD: CPT | Performed by: PHYSICIAN ASSISTANT

## 2020-11-04 PROCEDURE — 99213 OFFICE O/P EST LOW 20 MIN: CPT | Performed by: PHYSICIAN ASSISTANT

## 2020-11-04 PROCEDURE — G8427 DOCREV CUR MEDS BY ELIG CLIN: HCPCS | Performed by: PHYSICIAN ASSISTANT

## 2020-11-04 PROCEDURE — MISCD83 BANDIT: Performed by: PHYSICIAN ASSISTANT

## 2020-11-04 PROCEDURE — 3017F COLORECTAL CA SCREEN DOC REV: CPT | Performed by: PHYSICIAN ASSISTANT

## 2020-11-04 PROCEDURE — G8417 CALC BMI ABV UP PARAM F/U: HCPCS | Performed by: PHYSICIAN ASSISTANT

## 2020-11-04 PROCEDURE — 4040F PNEUMOC VAC/ADMIN/RCVD: CPT | Performed by: PHYSICIAN ASSISTANT

## 2020-11-04 PROCEDURE — G8484 FLU IMMUNIZE NO ADMIN: HCPCS | Performed by: PHYSICIAN ASSISTANT

## 2020-11-04 PROCEDURE — 1036F TOBACCO NON-USER: CPT | Performed by: PHYSICIAN ASSISTANT

## 2020-11-04 NOTE — PATIENT INSTRUCTIONS
Patient Education        Tennis Elbow: Exercises  Introduction  Here are some examples of exercises for you to try. The exercises may be suggested for a condition or for rehabilitation. Start each exercise slowly. Ease off the exercises if you start to have pain. You will be told when to start these exercises and which ones will work best for you. How to do the exercises  Wrist flexor stretch   1. Extend your arm in front of you with your palm up. 2. Bend your wrist, pointing your hand toward the floor. 3. With your other hand, gently bend your wrist farther until you feel a mild to moderate stretch in your forearm. 4. Hold for at least 15 to 30 seconds. Repeat 2 to 4 times. Wrist extensor stretch   1. Repeat steps 1 to 4 of the stretch above but begin with your extended hand palm down. Ball or sock squeeze   1. Hold a tennis ball (or a rolled-up sock) in your hand. 2. Make a fist around the ball (or sock) and squeeze. 3. Hold for about 6 seconds, and then relax for up to 10 seconds. 4. Repeat 8 to 12 times. 5. Switch the ball (or sock) to your other hand and do 8 to 12 times. Wrist deviation   1. Sit so that your arm is supported but your hand hangs off the edge of a flat surface, such as a table. 2. Hold your hand out like you are shaking hands with someone. 3. Move your hand up and down. 4. Repeat this motion 8 to 12 times. 5. Switch arms. 6. Try to do this exercise twice with each hand. Wrist curls   1. Place your forearm on a table with your hand hanging over the edge of the table, palm up. 2. Place a 1- to 2-pound weight in your hand. This may be a dumbbell, a can of food, or a filled water bottle. 3. Slowly raise and lower the weight while keeping your forearm on the table and your palm facing up. 4. Repeat this motion 8 to 12 times. 5. Switch arms, and do steps 1 through 4.  6. Repeat with your hand facing down toward the floor. Switch arms. Biceps curls   1.  Sit leaning forward with your legs slightly spread and your left hand on your left thigh. 2. Place your right elbow on your right thigh, and hold the weight with your forearm horizontal.  3. Slowly curl the weight up and toward your chest.  4. Repeat this motion 8 to 12 times. 5. Switch arms, and do steps 1 through 4. Follow-up care is a key part of your treatment and safety. Be sure to make and go to all appointments, and call your doctor if you are having problems. It's also a good idea to know your test results and keep a list of the medicines you take. Where can you learn more? Go to https://BuzzientpeTrekeaeb.[email or web address] org and sign in to your [employer] account. Enter S568 in the bMobilized box to learn more about \"Tennis Elbow: Exercises. \"     If you do not have an account, please click on the \"Sign Up Now\" link. Current as of: March 2, 2020               Content Version: 12.6  © 2006-2020 [employer]. Care instructions adapted under license by [facility] ([facility]). If you have questions about a medical condition or this instruction, always ask your healthcare professional. [place] [address] any warranty or liability for your use of this information.

## 2020-11-04 NOTE — PROGRESS NOTES
Subjective:      Patient ID: Franklin Castillo is a 77 y.o. male. Chief Complaint   Patient presents with    Elbow Pain     Left        HPI:   He is here for an evaluation of left lateral elbow and forearm pain. Previous lateral epicondylar injection 3/19/2020 with moderate relief. Symptoms are gradually returned. He has been using a elastic sleeve with minimal improvement. Pain 3/10. Pain aggravated with repetitive activity such as lifting, gripping and pushing. Previous treatments have included: Injection and anti-inflammatory medications with moderate relief or improvement. Review of Systems:   Negative for fever or chills. Negative for numbness or tingling in the left upper extremity.       Past Medical History:   Diagnosis Date    Arthritis     Chronic back pain     Diabetes mellitus ([facility] [address]     Fracture of nasal bone     Headache     Hearing loss     High blood pressure     Hyperlipidemia     Wears partial dentures        Family History   Problem Relation Age of Onset    Heart Disease Mother     High Blood Pressure Mother     Diabetes Mother     Heart Disease Father     High Blood Pressure Father     Diabetes Father        Past Surgical History:   Procedure Laterality Date   Lizettette Epley SURGERY  10/2007   1979  1980    BRONCHOSCOPY      CARPAL TUNNEL RELEASE Bilateral     COLONOSCOPY      ESOPHAGUS SURGERY      HIP ARTHROPLASTY Left 04/04/2017    HIP SURGERY  04/2008    bursa removed    JOINT REPLACEMENT  2009, 2006, 2003, 1997    Left knee replacement    KNEE SURGERY Left     [id number] scopes    SHOULDER ARTHROSCOPY Left 7/26/2019    LEFT SHOULDER ARTHROSCOPY, SUBACROMIAL DECOMPRESSION, ROTATOR CUFF REPAIR performed by Sabrina Johnston MD at [address] History     Occupational History    Not on file   Tobacco Use    Smoking status: Never Smoker    Smokeless tobacco: Never Used   Substance and Sexual Activity    Alcohol use: No    Drug use: No    Sexual activity: Not Currently       Current Outpatient Medications   Medication Sig Dispense Refill    tiZANidine (ZANAFLEX) 4 MG tablet Take 4 mg by mouth 3 times daily      naloxone 4 MG/0.1ML LIQD nasal spray 1 spray by Nasal route as needed for Opioid Reversal 1 each 5    butalbital-APAP-caffeine (FIORICET) -40 MG CAPS per capsule Take 1 capsule by mouth every 6 hours as needed for Headaches 15 capsule 0    oxyCODONE-acetaminophen (PERCOCET) 5-325 MG per tablet Take 1 tablet by mouth every 8 hours as needed for Pain.  gabapentin (NEURONTIN) 800 MG tablet Take 800 mg by mouth 3 times daily.  oxybutynin (DITROPAN-XL) 10 MG extended release tablet       amitriptyline (ELAVIL) 75 MG tablet Take 75 mg by mouth nightly as needed       augmented betamethasone dipropionate (DIPROLENE-AF) 0.05 % cream Apply thin layer topically to L ankle every third day 45 g 1    metformin (GLUCOPHAGE) 500 MG tablet Take 500 mg by mouth 2 times daily (with meals) Takes only as needed      metoprolol (TOPROL-XL) 25 MG XL tablet Take 25 mg by mouth daily       zolpidem (AMBIEN) 5 MG tablet Take 5 mg by mouth nightly as needed       baclofen (LIORESAL) 10 MG tablet Take 10 mg by mouth as needed       methadone (DOLOPHINE) 5 MG tablet Take 5 mg by mouth 2 times daily.  Multiple Vitamins-Minerals (CENTRUM SILVER PO) Take  by mouth.  lisinopril-hydrochlorothiazide (PRINZIDE;ZESTORETIC) 20-25 MG per tablet Take 1 Tab by mouth daily.  atorvastatin (LIPITOR) 20 MG tablet Take 20 mg by mouth nightly        No current facility-administered medications for this visit. Objective:   He  is alert, oriented x 3, pleasant, well nourished, developed and in no acute distress. Temp 98.1 °F (36.7 °C) (Temporal)      Left elbow examination:  There is no obvious deformity. There is no effusion of the joint. There is full range of motion.   There is no instability of the elbow.  There is moderate tenderness over the lateral epicondyle. There is mild swelling at the lateral epicondyle. Pain is with resisted wrist extension and pronation. Examination of the upper extremities are intact with sensation to light touch. Motor testing  5/5 in all major motor groups including hand intrinsics. Radial, Median and Ulnar nerves are intact. Waggoner's Sign absent. Examination of the upper extremities shows intact perfusion to all extremities. No cyanosis. Digits are warm to touch. Capillary refill is less than 2 seconds. There is no edema noted. Examination of the skin over the upper extremities:  Reveals the skin to be intact without lacerations or abrasions. There are no significant erythema, rashes or skin lesions. There is some discoloration of the skin over the lateral epicondyles due to prior corticosteroid injection. X Rays: not performed in the office today:     Assessment:       ICD-10-CM    1. Lateral epicondylitis of left elbow  M77.12 Bandit $30        Plan:     The natural history of the patient's diagnosis as well as the treatment options were discussed in full and questions were answered. Risks and benefits of the treatment options also reviewed in detail. A home exercise program was instructed today including ROM exercises and strengthening exercises. The patient verbalized understanding of these exercises as well as the importance of the exercise program to promote return of normal function. If pain intensifies or other problems arise you are to notify the office. Rest, Ice, Compression and Elevation  OTC NSAID'S discussed to be taken in appropriate  therapeutic doses. Activity restriction/ Modification discussed. The patient was advised that NSAID-type medications have two very important potential side effects: gastrointestinal irritation including hemorrhage and renal injuries.  He was asked to take the medication with food and to stop if he experiences any GI upset. I asked him to call for vomiting, abdominal pain or black/bloody stools. He should have renal function testing per his medical provider periodically. The patient expresses understanding of these issues and questions were answered. Tennis elbow strap recommended. Procedures    Bandit $30     Patient was supplied a Band-It Strap. This retail item was supplied to provide functional support and assist in protecting the affected area. Verbal and written instructions for the use of and application of this item were provided. The patient was educated and fit by a healthcare professional with expert knowledge and specialization in brace application. They were instructed to contact the office immediately should the equipment result in increased pain, decreased sensation, increased swelling or worsening of the condition. Follow Up:   Call or return to clinic prn if these symptoms worsen or fail to improve as anticipated.

## 2020-12-10 ENCOUNTER — OFFICE VISIT (OUTPATIENT)
Dept: ORTHOPEDIC SURGERY | Age: 65
End: 2020-12-10
Payer: COMMERCIAL

## 2020-12-10 VITALS — WEIGHT: 233 LBS | HEIGHT: 69 IN | TEMPERATURE: 97.6 F | BODY MASS INDEX: 34.51 KG/M2

## 2020-12-10 PROCEDURE — G8417 CALC BMI ABV UP PARAM F/U: HCPCS | Performed by: PHYSICIAN ASSISTANT

## 2020-12-10 PROCEDURE — 4040F PNEUMOC VAC/ADMIN/RCVD: CPT | Performed by: PHYSICIAN ASSISTANT

## 2020-12-10 PROCEDURE — G8484 FLU IMMUNIZE NO ADMIN: HCPCS | Performed by: PHYSICIAN ASSISTANT

## 2020-12-10 PROCEDURE — 20551 NJX 1 TENDON ORIGIN/INSJ: CPT | Performed by: PHYSICIAN ASSISTANT

## 2020-12-10 PROCEDURE — G8427 DOCREV CUR MEDS BY ELIG CLIN: HCPCS | Performed by: PHYSICIAN ASSISTANT

## 2020-12-10 PROCEDURE — 1123F ACP DISCUSS/DSCN MKR DOCD: CPT | Performed by: PHYSICIAN ASSISTANT

## 2020-12-10 PROCEDURE — 3017F COLORECTAL CA SCREEN DOC REV: CPT | Performed by: PHYSICIAN ASSISTANT

## 2020-12-10 PROCEDURE — 1036F TOBACCO NON-USER: CPT | Performed by: PHYSICIAN ASSISTANT

## 2020-12-10 PROCEDURE — 99213 OFFICE O/P EST LOW 20 MIN: CPT | Performed by: PHYSICIAN ASSISTANT

## 2020-12-10 NOTE — PROGRESS NOTES
Subjective:      Patient ID: Mervat Kulkarni is a 77 y.o.  male. Chief Complaint   Patient presents with    Elbow Pain     left        HPI: He is here for follow-up left elbow, lateral epicondylitis. Last seen 11/4/2020. Placed in a forearm strap for lateral epicondylitis. States symptoms are progressively worsening. Does not feel these lateral epicondylar strap is helping. He has been performing home exercise stretching program and occasional anti-inflammatory medications. Review of Systems:   Negative for fever or chills. Negative for numbness or tingling left upper extremity.     Past Medical History:   Diagnosis Date    Arthritis     Chronic back pain     Diabetes mellitus ([address]     Fracture of nasal bone     Headache     Hearing loss     High blood pressure     Hyperlipidemia     Wears partial dentures        Family History   Problem Relation Age of Onset    Heart Disease Mother     High Blood Pressure Mother     Diabetes Mother     Heart Disease Father     High Blood Pressure Father     Diabetes Father        Past Surgical History:   Procedure Laterality Date   Lourena Harrow SURGERY  10/2007   1979  1980    BRONCHOSCOPY      CARPAL TUNNEL RELEASE Bilateral     COLONOSCOPY      ESOPHAGUS SURGERY      HIP ARTHROPLASTY Left 04/04/2017    HIP SURGERY  04/2008    bursa removed    JOINT REPLACEMENT  2009, 2006, 2003, 1997    Left knee replacement    KNEE SURGERY Left     [id number] scopes    SHOULDER ARTHROSCOPY Left 7/26/2019    LEFT SHOULDER ARTHROSCOPY, SUBACROMIAL DECOMPRESSION, ROTATOR CUFF REPAIR performed by Declan Cha MD at [address] History     Occupational History    Not on file   Tobacco Use    Smoking status: Never Smoker    Smokeless tobacco: Never Used   Substance and Sexual Activity    Alcohol use: No    Drug use: No    Sexual activity: Not Currently       Current Outpatient Medications   Medication Sig Dispense Refill    tiZANidine (ZANAFLEX) 4 MG tablet Take 4 mg by mouth 3 times daily      naloxone 4 MG/0.1ML LIQD nasal spray 1 spray by Nasal route as needed for Opioid Reversal 1 each 5    butalbital-APAP-caffeine (FIORICET) -40 MG CAPS per capsule Take 1 capsule by mouth every 6 hours as needed for Headaches 15 capsule 0    oxyCODONE-acetaminophen (PERCOCET) 5-325 MG per tablet Take 1 tablet by mouth every 8 hours as needed for Pain.  gabapentin (NEURONTIN) 800 MG tablet Take 800 mg by mouth 3 times daily.  oxybutynin (DITROPAN-XL) 10 MG extended release tablet       amitriptyline (ELAVIL) 75 MG tablet Take 75 mg by mouth nightly as needed       augmented betamethasone dipropionate (DIPROLENE-AF) 0.05 % cream Apply thin layer topically to L ankle every third day 45 g 1    metformin (GLUCOPHAGE) 500 MG tablet Take 500 mg by mouth 2 times daily (with meals) Takes only as needed      metoprolol (TOPROL-XL) 25 MG XL tablet Take 25 mg by mouth daily       zolpidem (AMBIEN) 5 MG tablet Take 5 mg by mouth nightly as needed       baclofen (LIORESAL) 10 MG tablet Take 10 mg by mouth as needed       methadone (DOLOPHINE) 5 MG tablet Take 5 mg by mouth 2 times daily.  Multiple Vitamins-Minerals (CENTRUM SILVER PO) Take  by mouth.  lisinopril-hydrochlorothiazide (PRINZIDE;ZESTORETIC) 20-25 MG per tablet Take 1 Tab by mouth daily.  atorvastatin (LIPITOR) 20 MG tablet Take 20 mg by mouth nightly        No current facility-administered medications for this visit. Objective:   He is alert, oriented x 3, pleasant, well nourished, developed and in no acute distress. Temp 97.6 °F (36.4 °C)   Ht 5' 9\" (1.753 m)   Wt 233 lb (105.7 kg)   BMI 34.41 kg/m²      Left elbow exam:  There is no obvious deformity. There is no effusion of the joint. There is full range of motion. There is no instability of the elbow. There is tenderness over the lateral epicondyle.   There is minimal swelling at the lateral epicondyle. Pain is with resisted wrist extension and pronation. Examination of the upper extremities are intact with sensation to light touch. Motor testing  5/5 in all major motor groups including hand intrinsics. Radial, Median and Ulnar nerves are intact. Waggoner's Sign absent. Examination of the upper extremities shows intact perfusion to all extremities. No cyanosis. Digits are warm to touch. Capillary refill is less than 2 seconds. There is no edema noted. Examination of the skin over the upper extremities:  Reveals the skin to be intact without lacerations or abrasions. There are no significant erythema, rashes or skin lesions. X Rays: not performed in the office today:     Diagnosis:        ICD-10-CM    1. Lateral epicondylitis of left elbow  M77.12 13427 - DC DRAIN/INJECT INTERMEDIATE JOINT/BURSA     DC TRIAMCINOLONE ACETONIDE INJ        Assessment/ Plan:      Left lateral elbow pain due to lateral epicondylitis. No improvement with conservative treatment including elbow strap, home exercise program as well as anti-inflammatory medications. I did spend 15 minutes in the office today with greater than 50% of this time counseling, reviewing diagnostic tests, face to face discussion concerning their diagnosis and treatment options. All of their questions were answered. Risk and benefits of steroid injection reviewed with Latanya Early. Eliazar [place] verbally consents to left lateral epicondylar steroid injection. PROCEDURE NOTE:  Pre op Diagnosis: Lateral Epicondylitis  Post op Diagnosis: Same  With the patient's permission, his left lateral epicondylar region was prepped  in standard sterile fashion with  Alcohol and 2 cc of 0.25% Marcaine and 1 cc of Kenalog 40 mg was injected into the left lateral epicondylar region without difficulty. The patient tolerated this well without difficulty. A band-aid was applied.  The patient was advised to ice the area for 15-20 minutes to relieve any injection site related pain. Rest, Ice, Compression and Elevation  OTC NSAID'S discussed to be taken in appropriate  therapeutic doses. Activity restriction/ Modification discussed. The patient was advised that NSAID-type medications have two very important potential side effects: gastrointestinal irritation including hemorrhage and renal injuries. He was asked to take the medication with food and to stop if he experiences any GI upset. I asked him to call for vomiting, abdominal pain or black/bloody stools. He should have renal function testing per his medical provider periodically. The patient expresses understanding of these issues and questions were answered. A home exercise program was instructed today including ROM exercises and strengthening exercises. The patient verbalized understanding of these exercises as well as the importance of the exercise program to promote return of normal function. If pain intensifies or other problems arise you are to notify the office. Follow Up: 6 weeks  Call or return to clinic prn if these symptoms worsen or fail to improve as anticipated.

## 2021-04-01 ENCOUNTER — OFFICE VISIT (OUTPATIENT)
Dept: ORTHOPEDIC SURGERY | Age: 66
End: 2021-04-01
Payer: COMMERCIAL

## 2021-04-01 VITALS — HEIGHT: 69 IN | BODY MASS INDEX: 34.51 KG/M2 | WEIGHT: 233 LBS

## 2021-04-01 DIAGNOSIS — Z98.1 STATUS POST LUMBAR SPINAL FUSION: Primary | ICD-10-CM

## 2021-04-01 DIAGNOSIS — M54.16 LUMBAR RADICULOPATHY: ICD-10-CM

## 2021-04-01 PROCEDURE — G8427 DOCREV CUR MEDS BY ELIG CLIN: HCPCS | Performed by: PHYSICIAN ASSISTANT

## 2021-04-01 PROCEDURE — 1123F ACP DISCUSS/DSCN MKR DOCD: CPT | Performed by: PHYSICIAN ASSISTANT

## 2021-04-01 PROCEDURE — 1036F TOBACCO NON-USER: CPT | Performed by: PHYSICIAN ASSISTANT

## 2021-04-01 PROCEDURE — 99213 OFFICE O/P EST LOW 20 MIN: CPT | Performed by: PHYSICIAN ASSISTANT

## 2021-04-01 PROCEDURE — 4040F PNEUMOC VAC/ADMIN/RCVD: CPT | Performed by: PHYSICIAN ASSISTANT

## 2021-04-01 PROCEDURE — G8417 CALC BMI ABV UP PARAM F/U: HCPCS | Performed by: PHYSICIAN ASSISTANT

## 2021-04-01 PROCEDURE — 3017F COLORECTAL CA SCREEN DOC REV: CPT | Performed by: PHYSICIAN ASSISTANT

## 2021-04-01 NOTE — PROGRESS NOTES
New Patient: LUMBAR SPINE    Referring Provider:  No ref. provider found    CHIEF COMPLAINT:    Chief Complaint   Patient presents with    Back Pain     lumbar       HISTORY OF PRESENT ILLNESS:       Mr. Jessica Pavon  is a pleasant 77 y.o. male here for evaluation regarding his LBP and left leg pain. He states his pain began insidiously about 2 months ago. Patient has a significant previous history of spinal surgery in 1979 and again in [place] [address] by Dr. Barbi Ness, and then again in 2007 in which he underwent an L4-5 posterior laminectomy spinal fusion with instrumentation by Dr. Caren Abbott. His pain has steadily continued since then. He rates his back pain 6/10 and left leg pain 7/10. He describes the pain as constant and dull but can become sharp with certain ranges of motion. Pain is worse with acute flexion, prolonged standing and walking and improved some with sitting and resting. The leg pain radiates down the anterior aspect of his left leg to his knee. He denies numbness and tingling in his left leg. He has weakness of his left leg and denies bowel or bladder dysfunction. He states he can sit for a maximum of 30 to 60 minutes before he has to change position and stand for a maximum 15 to 20 minutes before he has to sit. The pain at times can disrupt his sleep. He has been under the care of  pain management and is scheduled for medial branch block on 4/13/2021. He has had previous facet injection approximately 1 month ago and is presently taking Percocet 5 mg 3 times a day for over a year. He is also taking Zanaflex, Relafen, and 800 mg of Neurontin 3 times a day. He has recently undergone physical therapy/aqua therapy.   Pain Assessment  Location of Pain: Back  Severity of Pain: 7  Quality of Pain: Other (Comment)]    Current/Past Treatment:   · Physical Therapy: Yes  · Chiropractic: No  · Injection: MARTINEZ x1 in the past month  · Medications: As noted above    Past Medical History:   Past Medical History: Diagnosis Date    Arthritis     Chronic back pain     Diabetes mellitus ([facility] [address]     Fracture of nasal bone     Headache     Hearing loss     High blood pressure     Hyperlipidemia     Wears partial dentures         Past Surgical History:     Past Surgical History:   Procedure Laterality Date    APPENDECTOMY      BACK SURGERY  10/2007   1979  1980    BRONCHOSCOPY      CARPAL TUNNEL RELEASE Bilateral     COLONOSCOPY      ESOPHAGUS SURGERY      HIP ARTHROPLASTY Left 04/04/2017    HIP SURGERY  04/2008    bursa removed    JOINT REPLACEMENT  2009, 2006, 2003, 1997    Left knee replacement    KNEE SURGERY Left     [id number] scopes    SHOULDER ARTHROSCOPY Left 7/26/2019    LEFT SHOULDER ARTHROSCOPY, SUBACROMIAL DECOMPRESSION, ROTATOR CUFF REPAIR performed by Lorna Oro MD at [place] [address]         Current Medications:     Current Outpatient Medications:     tiZANidine (ZANAFLEX) 4 MG tablet, Take 4 mg by mouth 3 times daily, Disp: , Rfl:     naloxone 4 MG/0.1ML LIQD nasal spray, 1 spray by Nasal route as needed for Opioid Reversal, Disp: 1 each, Rfl: 5    butalbital-APAP-caffeine (FIORICET) -40 MG CAPS per capsule, Take 1 capsule by mouth every 6 hours as needed for Headaches, Disp: 15 capsule, Rfl: 0    oxyCODONE-acetaminophen (PERCOCET) 5-325 MG per tablet, Take 1 tablet by mouth every 8 hours as needed for Pain., Disp: , Rfl:     gabapentin (NEURONTIN) 800 MG tablet, Take 800 mg by mouth 3 times daily.  , Disp: , Rfl:     oxybutynin (DITROPAN-XL) 10 MG extended release tablet, , Disp: , Rfl:     amitriptyline (ELAVIL) 75 MG tablet, Take 75 mg by mouth nightly as needed , Disp: , Rfl:     augmented betamethasone dipropionate (DIPROLENE-AF) 0.05 % cream, Apply thin layer topically to L ankle every third day, Disp: 45 g, Rfl: 1    metformin (GLUCOPHAGE) 500 MG tablet, Take 500 mg by mouth 2 times daily (with meals) Takes only as needed, Disp: , Rfl:     metoprolol (TOPROL-XL) 25 MG XL tablet, Take 25 mg by mouth daily , Disp: , Rfl:     zolpidem (AMBIEN) 5 MG tablet, Take 5 mg by mouth nightly as needed , Disp: , Rfl:     baclofen (LIORESAL) 10 MG tablet, Take 10 mg by mouth as needed , Disp: , Rfl:     methadone (DOLOPHINE) 5 MG tablet, Take 5 mg by mouth 2 times daily. , Disp: , Rfl:     Multiple Vitamins-Minerals (CENTRUM SILVER PO), Take  by mouth., Disp: , Rfl:     lisinopril-hydrochlorothiazide (PRINZIDE;ZESTORETIC) 20-25 MG per tablet, Take 1 Tab by mouth daily. , Disp: , Rfl:     atorvastatin (LIPITOR) 20 MG tablet, Take 20 mg by mouth nightly , Disp: , Rfl:     Allergies:  Prochlorperazine    Social History:    reports that he has never smoked. He has never used smokeless tobacco. He reports that he does not drink alcohol or use drugs. Family History:   Family History   Problem Relation Age of Onset    Heart Disease Mother     High Blood Pressure Mother     Diabetes Mother     Heart Disease Father     High Blood Pressure Father     Diabetes Father        REVIEW OF SYSTEMS: Full ROS noted & scanned   CONSTITUTIONAL: Denies unexplained weight loss, fevers, chills or fatigue  NEUROLOGICAL: Denies unsteady gait or progressive weakness  MUSCULOSKELETAL: Denies joint swelling or redness  PSYCHOLOGICAL: Denies anxiety, depression   SKIN: Denies skin changes, delayed healing, rash, itching   HEMATOLOGIC: Denies easy bleeding or bruising  ENDOCRINE: Denies excessive thirst, urination, heat/cold  RESPIRATORY: Denies current dyspnea, cough  GI: Denies nausea, vomiting, diarrhea   : Denies bowel or bladder issues      PHYSICAL EXAM:    Vitals: Height 5' 9.02\" (1.753 m), weight 233 lb (105.7 kg). GENERAL EXAM:  · General Apparence: Patient is adequately groomed with no evidence of malnutrition. · Orientation: The patient is oriented to time, place and person. · Mood & Affect:The patient's mood and affect are appropriate.   · Vascular: Examination reveals no swelling tenderness in upper or lower extremities. Good capillary refill. · Lymphatic: The lymphatic examination bilaterally reveals all areas to be without enlargement or induration  · Sensation: Sensation is intact without deficit  · Coordination/Balance: Patient ambulates with a slight forward flexed position. tandem walking normal.     LUMBAR/SACRAL EXAMINATION:  · Inspection: Local inspection shows no step-off or bruising. Lumbar alignment is normal.  Sagittal and Coronal balance is neutral.      · Palpation:   No evidence of tenderness at the midline. No tenderness bilaterally at the paraspinal or trochanters. There is no step-off or paraspinal spasm. · Range of Motion: Lumbar flexion moderate to severely limited secondary to pain, but extension and rotation are mildly limited due to pain. · Strength:   Strength testing is 5/5 in all muscle groups tested. · Special Tests:   Straight leg raise and crossed SLR negative. Leg length and pelvis level. · Skin: There are no rashes, ulcerations or lesions. · Reflexes: Reflexes are symmetrically 2+ at the patellar and ankle tendons. Clonus absent bilaterally at the feet. · Gait & station: normal, patient ambulates without assistance    · Additional Examinations:   · RIGHT LOWER EXTREMITY: Inspection/examination of the right lower extremity does not show any tenderness, deformity or injury. Range of motion is unremarkable. There is no gross instability. There are no rashes, ulcerations or lesions. Strength and tone are normal.  · LEFT LOWER EXTREMITY:  Inspection/examination of the left lower extremity does not show any tenderness, deformity or injury. Range of motion is unremarkable. There is no gross instability. There are no rashes, ulcerations or lesions. Strength and tone are normal.    Diagnostic Testing:    MRI of the lumbar spine from 7/20/2020 was reviewed with the patient today  CONCLUSION:   1.  Status post arthrodesis with posterior metallic

## 2021-06-21 ENCOUNTER — OFFICE VISIT (OUTPATIENT)
Dept: ORTHOPEDIC SURGERY | Age: 66
End: 2021-06-21
Payer: COMMERCIAL

## 2021-06-21 VITALS — BODY MASS INDEX: 35.46 KG/M2 | HEIGHT: 68 IN | WEIGHT: 234 LBS

## 2021-06-21 DIAGNOSIS — M54.2 CERVICAL PAIN: Primary | ICD-10-CM

## 2021-06-21 PROCEDURE — 1036F TOBACCO NON-USER: CPT | Performed by: PHYSICIAN ASSISTANT

## 2021-06-21 PROCEDURE — 4040F PNEUMOC VAC/ADMIN/RCVD: CPT | Performed by: PHYSICIAN ASSISTANT

## 2021-06-21 PROCEDURE — 1123F ACP DISCUSS/DSCN MKR DOCD: CPT | Performed by: PHYSICIAN ASSISTANT

## 2021-06-21 PROCEDURE — 99214 OFFICE O/P EST MOD 30 MIN: CPT | Performed by: PHYSICIAN ASSISTANT

## 2021-06-21 PROCEDURE — G8417 CALC BMI ABV UP PARAM F/U: HCPCS | Performed by: PHYSICIAN ASSISTANT

## 2021-06-21 PROCEDURE — 3017F COLORECTAL CA SCREEN DOC REV: CPT | Performed by: PHYSICIAN ASSISTANT

## 2021-06-21 PROCEDURE — G8427 DOCREV CUR MEDS BY ELIG CLIN: HCPCS | Performed by: PHYSICIAN ASSISTANT

## 2021-06-21 RX ORDER — METHYLPREDNISOLONE 4 MG/1
TABLET ORAL
Qty: 1 KIT | Refills: 0 | Status: SHIPPED | OUTPATIENT
Start: 2021-06-21 | End: 2021-06-27

## 2021-06-21 NOTE — PROGRESS NOTES
CERVICAL SPINE    CHIEF COMPLAINT:    Chief Complaint   Patient presents with    Neck Pain     CK CERVICAL       HISTORY OF PRESENT ILLNESS:   Mr. Dwayne Bell is a pleasant 77year old male, previously seen for his lumbar spine, who is here today for evaluation for neck pain. He notes his neck pain began about 3-4 weeks ago. He denies any recent fall or injury to his cervical spine. The pain is localized to his posterior and left cervical spine and does not radiate. He denies upper extremity radicular pain, numbness or weakness. He notes occasional difficulty with fine motor tasks in his left hand. He denies saddle numbness, bowel or bladder dysfunction or gait instability. He takes percocet and muscle relaxer for his chronic back pain, but does not feel that this has helped his neck pain.     Current/Past Treatment:   · Physical Therapy: No  · Chiropractic:  No  · Injection:  No   · Medications: Percocet,  Lyrica, Zanaflex    Past Medical History:   Past Medical History:   Diagnosis Date    Arthritis     Chronic back pain     Diabetes mellitus ([facility] [address]     Fracture of nasal bone     Headache     Hearing loss     High blood pressure     Hyperlipidemia     Wears partial dentures         Past Surgical History:     Past Surgical History:   Procedure Laterality Date    APPENDECTOMY      BACK SURGERY  10/2007   1979  1980    BRONCHOSCOPY      CARPAL TUNNEL RELEASE Bilateral     COLONOSCOPY      ESOPHAGUS SURGERY      HIP ARTHROPLASTY Left 04/04/2017    HIP SURGERY  04/2008    bursa removed    JOINT REPLACEMENT  2009, 2006, 2003, 1997    Left knee replacement    KNEE SURGERY Left     [id number] scopes    SHOULDER ARTHROSCOPY Left 7/26/2019    LEFT SHOULDER ARTHROSCOPY, SUBACROMIAL DECOMPRESSION, ROTATOR CUFF REPAIR performed by Marielle Boyd MD at [place] [address]         Current Medications:     Current Outpatient Medications:     Pregabalin (LYRICA PO), Take by mouth, Disp: , Rfl:    methylPREDNISolone (MEDROL, GEORGES,) 4 MG tablet, Take as directed., Disp: 1 kit, Rfl: 0    tiZANidine (ZANAFLEX) 4 MG tablet, Take 4 mg by mouth 3 times daily, Disp: , Rfl:     naloxone 4 MG/0.1ML LIQD nasal spray, 1 spray by Nasal route as needed for Opioid Reversal, Disp: 1 each, Rfl: 5    butalbital-APAP-caffeine (FIORICET) -40 MG CAPS per capsule, Take 1 capsule by mouth every 6 hours as needed for Headaches, Disp: 15 capsule, Rfl: 0    oxyCODONE-acetaminophen (PERCOCET) 5-325 MG per tablet, Take 1 tablet by mouth every 8 hours as needed for Pain., Disp: , Rfl:     gabapentin (NEURONTIN) 800 MG tablet, Take 800 mg by mouth 3 times daily. , Disp: , Rfl:     oxybutynin (DITROPAN-XL) 10 MG extended release tablet, , Disp: , Rfl:     amitriptyline (ELAVIL) 75 MG tablet, Take 75 mg by mouth nightly as needed , Disp: , Rfl:     augmented betamethasone dipropionate (DIPROLENE-AF) 0.05 % cream, Apply thin layer topically to L ankle every third day, Disp: 45 g, Rfl: 1    metformin (GLUCOPHAGE) 500 MG tablet, Take 500 mg by mouth 2 times daily (with meals) Takes only as needed, Disp: , Rfl:     metoprolol (TOPROL-XL) 25 MG XL tablet, Take 25 mg by mouth daily , Disp: , Rfl:     zolpidem (AMBIEN) 5 MG tablet, Take 5 mg by mouth nightly as needed , Disp: , Rfl:     baclofen (LIORESAL) 10 MG tablet, Take 10 mg by mouth as needed , Disp: , Rfl:     methadone (DOLOPHINE) 5 MG tablet, Take 5 mg by mouth 2 times daily. , Disp: , Rfl:     Multiple Vitamins-Minerals (CENTRUM SILVER PO), Take  by mouth., Disp: , Rfl:     lisinopril-hydrochlorothiazide (PRINZIDE;ZESTORETIC) 20-25 MG per tablet, Take 1 Tab by mouth daily. , Disp: , Rfl:     atorvastatin (LIPITOR) 20 MG tablet, Take 20 mg by mouth nightly , Disp: , Rfl:     Allergies:  Prochlorperazine    Social History:    reports that he has never smoked.  He has never used smokeless tobacco. He reports that he does not drink alcohol and does not use drugs. Family History:   Family History   Problem Relation Age of Onset    Heart Disease Mother     High Blood Pressure Mother     Diabetes Mother     Heart Disease Father     High Blood Pressure Father     Diabetes Father        REVIEW OF SYSTEMS: Full ROS noted & scanned   CONSTITUTIONAL: Denies unexplained weight loss, fevers, chills or fatigue  NEUROLOGICAL: Denies unsteady gait or progressive weakness  MUSCULOSKELETAL: Denies joint swelling or redness  PSYCHOLOGICAL: Denies anxiety, depression   SKIN: Denies skin changes, delayed healing, rash, itching   HEMATOLOGIC: Denies easy bleeding or bruising  ENDOCRINE: Denies excessive thirst, urination, heat/cold  RESPIRATORY: Denies current dyspnea, cough  GI: Denies nausea, vomiting, diarrhea   : Denies bowel or bladder issues       PHYSICAL EXAM:    Vitals: Height 5' 8\" (1.727 m), weight 234 lb (106.1 kg). GENERAL EXAM:  · General Apparence: Patient is adequately groomed with no evidence of malnutrition. · Orientation: The patient is oriented to time, place and person. · Mood & Affect:The patient's mood and affect are appropriate   · Vascular: Examination reveals no swelling tenderness in upper or lower extremities. Good capillary refill  · Lymphatic: The lymphatic examination bilaterally reveals all areas to be without enlargement or induration  · Sensation: Sensation is intact without deficit  · Coordination/Balance: Good coordination. Tandem walking normal.     CERVICAL EXAMINATION:  · Inspection: Local inspection shows no step-off or bruising. Cervical alignment is normal.     · Palpation: No evidence of tenderness at the midline, and trapezius. Paraspinal tenderness is present. There is no step-off or paraspinal spasm. · Range of Motion: Cervical flexion, extension, and rotation are mildly reduced with pain. · Strength: 5/5 bilateral upper extremities   · Special Tests:    ·  Spurling's negative & Waggoner's negative bilaterally. ·  Cubital and Carpal tunnel Tinel's negative bilaterally. · Skin:There are no rashes, ulcerations or lesions in right & left upper extremities. · Reflexes: Bilaterally triceps, biceps and brachioradialis are 2+. Clonus absent bilaterally at the feet. · Gait & station: normal, patient ambulates without assistance       · Additional Examinations:       · RIGHT UPPER EXTREMITY:  Inspection/examination of the right upper extremity does not show any tenderness, deformity or injury. Range of motion is unremarkable. There is no gross instability. There are no rashes, ulcerations or lesions. Strength and tone are normal.  · LEFT UPPER EXTREMITY: Inspection/examination of the left upper extremity does not show any tenderness, deformity or injury. Range of motion is unremarkable. There is no gross instability. There are no rashes, ulcerations or lesions. Strength and tone are normal.    Diagnostic Testing:  AP and lateral xray images of his cervical spine were obtained in the office today and independently reviewed. They show multilevel spondylosis, most pronounced C3-C6, and spondylolisthesis C5-6. CT cervical spine 2019 notes multilevel DDD and retrolisthesis. Impression:   Cervical spondylosis and spondylolisthesis    Plan:    We discussed treatment options including observation, physical therapy, epidural injections or additional imaging. He wishes to proceed with home exercises and an oral steroid taper. He was advised to stop all NSAIDs while on the medrol dose pack. He has tolerated these well in the past but will track his glucose levels and stop the steroid immediately if it causes significant increase in glucose levels. He was provided cervical exercises to try at home. He will call for a cervical MRI for consideration for an epidural injection if his symptoms persist after that, or if he develops any new neurologic symptoms. Old records were reviewed.     Abelardo Dexter PA-C

## 2021-07-12 ENCOUNTER — TELEPHONE (OUTPATIENT)
Dept: ORTHOPEDIC SURGERY | Age: 66
End: 2021-07-12

## 2021-07-13 ENCOUNTER — TELEPHONE (OUTPATIENT)
Dept: ORTHOPEDIC SURGERY | Age: 66
End: 2021-07-13

## 2021-07-13 DIAGNOSIS — M54.2 CERVICAL PAIN: Primary | ICD-10-CM

## 2021-07-13 DIAGNOSIS — M54.16 LUMBAR RADICULOPATHY: Primary | ICD-10-CM

## 2021-07-13 RX ORDER — ALPRAZOLAM 1 MG/1
TABLET ORAL
Qty: 2 TABLET | Refills: 0 | Status: SHIPPED | OUTPATIENT
Start: 2021-07-13 | End: 2021-07-14

## 2021-07-19 ENCOUNTER — TELEPHONE (OUTPATIENT)
Dept: ORTHOPEDIC SURGERY | Age: 66
End: 2021-07-19

## 2021-07-19 NOTE — TELEPHONE ENCOUNTER
Appointment Request     Patient requesting earlier appointment: Yes  Appointment offered to patient: 07/26  Patient Contact Number: [phone]  PT WANTS TO KNOW IF HE COULD POSSIBLY GET IN THIS AFTERNOON FOR RESULTS OF HIS MRI ON 07/15. IF SO PLEASE CALL [phone].

## 2021-07-19 NOTE — TELEPHONE ENCOUNTER
SPOKE TO DR Vuong Snowball STAFF AND MOVED APPT TO TOMORROW WITH JEREL CHAPPELL AT 2:00 AT [facility]

## 2021-07-20 ENCOUNTER — OFFICE VISIT (OUTPATIENT)
Dept: ORTHOPEDIC SURGERY | Age: 66
End: 2021-07-20
Payer: MEDICARE

## 2021-07-20 VITALS — HEIGHT: 69 IN | BODY MASS INDEX: 34.51 KG/M2 | WEIGHT: 233 LBS

## 2021-07-20 DIAGNOSIS — M48.02 FORAMINAL STENOSIS OF CERVICAL REGION: Primary | ICD-10-CM

## 2021-07-20 PROCEDURE — G8417 CALC BMI ABV UP PARAM F/U: HCPCS | Performed by: PHYSICIAN ASSISTANT

## 2021-07-20 PROCEDURE — 4040F PNEUMOC VAC/ADMIN/RCVD: CPT | Performed by: PHYSICIAN ASSISTANT

## 2021-07-20 PROCEDURE — 99213 OFFICE O/P EST LOW 20 MIN: CPT | Performed by: PHYSICIAN ASSISTANT

## 2021-07-20 PROCEDURE — 1036F TOBACCO NON-USER: CPT | Performed by: PHYSICIAN ASSISTANT

## 2021-07-20 PROCEDURE — 1123F ACP DISCUSS/DSCN MKR DOCD: CPT | Performed by: PHYSICIAN ASSISTANT

## 2021-07-20 PROCEDURE — G8427 DOCREV CUR MEDS BY ELIG CLIN: HCPCS | Performed by: PHYSICIAN ASSISTANT

## 2021-07-20 PROCEDURE — 3017F COLORECTAL CA SCREEN DOC REV: CPT | Performed by: PHYSICIAN ASSISTANT

## 2021-07-20 NOTE — PROGRESS NOTES
Follow-up: CERVICAL SPINE    CHIEF COMPLAINT:    Chief Complaint   Patient presents with    Neck Pain     cervical, 7/10       HISTORY OF PRESENT ILLNESS:   Mr. Jose D Jhaveri is a pleasant 77year old male, previously seen for his lumbar spine, who is here today for follow-up evaluation for neck pain. He notes his neck pain began about 3-4 weeks ago. He denies any recent fall or injury to his cervical spine. The pain is localized to his posterior and left cervical spine and does not radiate. He denies upper extremity radicular pain, numbness or weakness. He notes occasional difficulty with fine motor tasks in his left hand. He denies saddle numbness, bowel or bladder dysfunction or gait instability. He takes percocet and muscle relaxer for his chronic back pain, but does not feel that this has helped his neck pain.   Pain Assessment  Location of Pain: Neck  Severity of Pain: 7]    Current/Past Treatment:   · Physical Therapy: No  · Chiropractic:  No  · Injection:  No   · Medications: Percocet,  Lyrica, Zanaflex    Past Medical History:   Past Medical History:   Diagnosis Date    Arthritis     Chronic back pain     Diabetes mellitus ([facility] [address]     Fracture of nasal bone     Headache     Hearing loss     High blood pressure     Hyperlipidemia     Wears partial dentures         Past Surgical History:     Past Surgical History:   Procedure Laterality Date    APPENDECTOMY      BACK SURGERY  10/2007   1979  1980    BRONCHOSCOPY      CARPAL TUNNEL RELEASE Bilateral     COLONOSCOPY      ESOPHAGUS SURGERY      HIP ARTHROPLASTY Left 04/04/2017    HIP SURGERY  04/2008    bursa removed    JOINT REPLACEMENT  2009, 2006, 2003, 1997    Left knee replacement    KNEE SURGERY Left     [id number] scopes    SHOULDER ARTHROSCOPY Left 7/26/2019    LEFT SHOULDER ARTHROSCOPY, SUBACROMIAL DECOMPRESSION, ROTATOR CUFF REPAIR performed by Mecca Holguin MD at [place] [address]         Current Medications:     Current Outpatient Medications:     Pregabalin (LYRICA PO), Take by mouth, Disp: , Rfl:     tiZANidine (ZANAFLEX) 4 MG tablet, Take 4 mg by mouth 3 times daily, Disp: , Rfl:     naloxone 4 MG/0.1ML LIQD nasal spray, 1 spray by Nasal route as needed for Opioid Reversal, Disp: 1 each, Rfl: 5    butalbital-APAP-caffeine (FIORICET) -40 MG CAPS per capsule, Take 1 capsule by mouth every 6 hours as needed for Headaches, Disp: 15 capsule, Rfl: 0    oxyCODONE-acetaminophen (PERCOCET) 5-325 MG per tablet, Take 1 tablet by mouth every 8 hours as needed for Pain., Disp: , Rfl:     gabapentin (NEURONTIN) 800 MG tablet, Take 800 mg by mouth 3 times daily. , Disp: , Rfl:     oxybutynin (DITROPAN-XL) 10 MG extended release tablet, , Disp: , Rfl:     amitriptyline (ELAVIL) 75 MG tablet, Take 75 mg by mouth nightly as needed , Disp: , Rfl:     augmented betamethasone dipropionate (DIPROLENE-AF) 0.05 % cream, Apply thin layer topically to L ankle every third day, Disp: 45 g, Rfl: 1    metformin (GLUCOPHAGE) 500 MG tablet, Take 500 mg by mouth 2 times daily (with meals) Takes only as needed, Disp: , Rfl:     metoprolol (TOPROL-XL) 25 MG XL tablet, Take 25 mg by mouth daily , Disp: , Rfl:     zolpidem (AMBIEN) 5 MG tablet, Take 5 mg by mouth nightly as needed , Disp: , Rfl:     baclofen (LIORESAL) 10 MG tablet, Take 10 mg by mouth as needed , Disp: , Rfl:     methadone (DOLOPHINE) 5 MG tablet, Take 5 mg by mouth 2 times daily. , Disp: , Rfl:     Multiple Vitamins-Minerals (CENTRUM SILVER PO), Take  by mouth., Disp: , Rfl:     lisinopril-hydrochlorothiazide (PRINZIDE;ZESTORETIC) 20-25 MG per tablet, Take 1 Tab by mouth daily. , Disp: , Rfl:     atorvastatin (LIPITOR) 20 MG tablet, Take 20 mg by mouth nightly , Disp: , Rfl:     Allergies:  Prochlorperazine    Social History:    reports that he has never smoked. He has never used smokeless tobacco. He reports that he does not drink alcohol and does not use drugs.     Family History:   Family History   Problem Relation Age of Onset    Heart Disease Mother     High Blood Pressure Mother     Diabetes Mother     Heart Disease Father     High Blood Pressure Father     Diabetes Father        REVIEW OF SYSTEMS: Full ROS noted & scanned   CONSTITUTIONAL: Denies unexplained weight loss, fevers, chills or fatigue  NEUROLOGICAL: Denies unsteady gait or progressive weakness  MUSCULOSKELETAL: Denies joint swelling or redness  PSYCHOLOGICAL: Denies anxiety, depression   SKIN: Denies skin changes, delayed healing, rash, itching   HEMATOLOGIC: Denies easy bleeding or bruising  ENDOCRINE: Denies excessive thirst, urination, heat/cold  RESPIRATORY: Denies current dyspnea, cough  GI: Denies nausea, vomiting, diarrhea   : Denies bowel or bladder issues       PHYSICAL EXAM:    Vitals: Height 5' 9.02\" (1.753 m), weight 233 lb (105.7 kg). GENERAL EXAM:  · General Apparence: Patient is adequately groomed with no evidence of malnutrition. · Orientation: The patient is oriented to time, place and person. · Mood & Affect:The patient's mood and affect are appropriate   · Vascular: Examination reveals no swelling tenderness in upper or lower extremities. Good capillary refill  · Lymphatic: The lymphatic examination bilaterally reveals all areas to be without enlargement or induration  · Sensation: Sensation is intact without deficit  · Coordination/Balance: Good coordination. Tandem walking normal.     CERVICAL EXAMINATION:  · Inspection: Local inspection shows no step-off or bruising. Cervical alignment is normal.     · Palpation: No evidence of tenderness at the midline, and trapezius. Paraspinal tenderness is present. There is no step-off or paraspinal spasm. · Range of Motion: Cervical flexion, extension, and rotation are mildly reduced with pain. · Strength: 5/5 bilateral upper extremities   · Special Tests:    ·  Spurling's negative & Waggoner's negative bilaterally.    ·  Cubital and Carpal tunnel Tinel's negative bilaterally. · Skin:There are no rashes, ulcerations or lesions in right & left upper extremities. · Reflexes: Bilaterally triceps, biceps and brachioradialis are 2+. Clonus absent bilaterally at the feet. · Gait & station: normal, patient ambulates without assistance       · Additional Examinations:       · RIGHT UPPER EXTREMITY:  Inspection/examination of the right upper extremity does not show any tenderness, deformity or injury. Range of motion is unremarkable. There is no gross instability. There are no rashes, ulcerations or lesions. Strength and tone are normal.  · LEFT UPPER EXTREMITY: Inspection/examination of the left upper extremity does not show any tenderness, deformity or injury. Range of motion is unremarkable. There is no gross instability. There are no rashes, ulcerations or lesions. Strength and tone are normal.    Diagnostic Testing:  . MRI of the cervical spine that was obtained on 7/17/2021 was reviewed with the patient today which reveals  CONCLUSION:   1. Central protrusion type C2-3 disc herniation indenting the anterior aspect of thecal sac. 2. Central protrusion type C3-4 disc herniation indenting the anterior aspect of thecal sac. 3. Central protrusion type C4-5 disc herniation indenting the anterior aspect of thecal sac. 4. Broad central protrusion type C5-6 disc herniation indenting the anterior thecal sac.   5. Uncovertebral osteophytes producing moderate right mild left C3-4 and moderate bilateral    C5-6 foraminal stenosis. Impression:   Cervical spondylosis and spondylolisthesis  Cervical foraminal stenosis    Plan:    We discussed treatment options including observation, physical therapy, epidural injections or additional imaging. He wishes to proceed with referral to Dr. Kiara Mullins for his continue evaluation and care and potential spinal injections. He is to continue as tolerated with his cervical traction unit.     Follow-up: As needed    Patient examined and note dictated by Robert Glover PA-C.

## 2021-08-06 ENCOUNTER — HOSPITAL ENCOUNTER (OUTPATIENT)
Age: 66
Discharge: HOME | End: 2021-08-06
Payer: COMMERCIAL

## 2021-08-06 ENCOUNTER — HOSPITAL ENCOUNTER (OUTPATIENT)
Dept: GENERAL RADIOLOGY | Age: 66
Discharge: HOME | End: 2021-08-06
Payer: COMMERCIAL

## 2021-08-06 DIAGNOSIS — R05.9 COUGH: ICD-10-CM

## 2021-08-06 PROCEDURE — 71046 X-RAY EXAM CHEST 2 VIEWS: CPT

## 2021-08-31 ENCOUNTER — OFFICE VISIT (OUTPATIENT)
Dept: ORTHOPEDIC SURGERY | Age: 66
End: 2021-08-31
Payer: MEDICARE

## 2021-08-31 VITALS — HEIGHT: 69 IN | BODY MASS INDEX: 34.51 KG/M2 | WEIGHT: 233 LBS

## 2021-08-31 DIAGNOSIS — M47.812 CERVICAL SPONDYLOSIS: Primary | ICD-10-CM

## 2021-08-31 PROCEDURE — 99213 OFFICE O/P EST LOW 20 MIN: CPT | Performed by: ORTHOPAEDIC SURGERY

## 2021-08-31 PROCEDURE — G8427 DOCREV CUR MEDS BY ELIG CLIN: HCPCS | Performed by: ORTHOPAEDIC SURGERY

## 2021-08-31 PROCEDURE — 3017F COLORECTAL CA SCREEN DOC REV: CPT | Performed by: ORTHOPAEDIC SURGERY

## 2021-08-31 PROCEDURE — 1036F TOBACCO NON-USER: CPT | Performed by: ORTHOPAEDIC SURGERY

## 2021-08-31 PROCEDURE — 1123F ACP DISCUSS/DSCN MKR DOCD: CPT | Performed by: ORTHOPAEDIC SURGERY

## 2021-08-31 PROCEDURE — 4040F PNEUMOC VAC/ADMIN/RCVD: CPT | Performed by: ORTHOPAEDIC SURGERY

## 2021-08-31 PROCEDURE — G8417 CALC BMI ABV UP PARAM F/U: HCPCS | Performed by: ORTHOPAEDIC SURGERY

## 2021-08-31 NOTE — PROGRESS NOTES
REPAIR performed by Rachna Porter MD at [place] [address]         Current Medications:     Current Outpatient Medications:     Pregabalin (LYRICA PO), Take by mouth, Disp: , Rfl:     tiZANidine (ZANAFLEX) 4 MG tablet, Take 4 mg by mouth 3 times daily, Disp: , Rfl:     naloxone 4 MG/0.1ML LIQD nasal spray, 1 spray by Nasal route as needed for Opioid Reversal, Disp: 1 each, Rfl: 5    butalbital-APAP-caffeine (FIORICET) -40 MG CAPS per capsule, Take 1 capsule by mouth every 6 hours as needed for Headaches, Disp: 15 capsule, Rfl: 0    oxyCODONE-acetaminophen (PERCOCET) 5-325 MG per tablet, Take 1 tablet by mouth every 8 hours as needed for Pain., Disp: , Rfl:     gabapentin (NEURONTIN) 800 MG tablet, Take 800 mg by mouth 3 times daily. , Disp: , Rfl:     oxybutynin (DITROPAN-XL) 10 MG extended release tablet, , Disp: , Rfl:     amitriptyline (ELAVIL) 75 MG tablet, Take 75 mg by mouth nightly as needed , Disp: , Rfl:     augmented betamethasone dipropionate (DIPROLENE-AF) 0.05 % cream, Apply thin layer topically to L ankle every third day, Disp: 45 g, Rfl: 1    metformin (GLUCOPHAGE) 500 MG tablet, Take 500 mg by mouth 2 times daily (with meals) Takes only as needed, Disp: , Rfl:     metoprolol (TOPROL-XL) 25 MG XL tablet, Take 25 mg by mouth daily , Disp: , Rfl:     zolpidem (AMBIEN) 5 MG tablet, Take 5 mg by mouth nightly as needed , Disp: , Rfl:     baclofen (LIORESAL) 10 MG tablet, Take 10 mg by mouth as needed , Disp: , Rfl:     methadone (DOLOPHINE) 5 MG tablet, Take 5 mg by mouth 2 times daily. , Disp: , Rfl:     Multiple Vitamins-Minerals (CENTRUM SILVER PO), Take  by mouth., Disp: , Rfl:     lisinopril-hydrochlorothiazide (PRINZIDE;ZESTORETIC) 20-25 MG per tablet, Take 1 Tab by mouth daily. , Disp: , Rfl:     atorvastatin (LIPITOR) 20 MG tablet, Take 20 mg by mouth nightly , Disp: , Rfl:     Allergies:  Prochlorperazine    Social History:    reports that he has never smoked.  He has never used smokeless tobacco. He reports that he does not drink alcohol and does not use drugs. Family History:   Family History   Problem Relation Age of Onset    Heart Disease Mother     High Blood Pressure Mother     Diabetes Mother     Heart Disease Father     High Blood Pressure Father     Diabetes Father        REVIEW OF SYSTEMS: Full ROS noted & scanned   CONSTITUTIONAL: Denies unexplained weight loss, fevers, chills or fatigue  NEUROLOGICAL: Denies unsteady gait or progressive weakness  MUSCULOSKELETAL: Denies joint swelling or redness  PSYCHOLOGICAL: Denies anxiety, depression   SKIN: Denies skin changes, delayed healing, rash, itching   HEMATOLOGIC: Denies easy bleeding or bruising  ENDOCRINE: Denies excessive thirst, urination, heat/cold  RESPIRATORY: Denies current dyspnea, cough  GI: Denies nausea, vomiting, diarrhea   : Denies bowel or bladder issues       PHYSICAL EXAM:    Vitals: Height 5' 9.02\" (1.753 m), weight 233 lb (105.7 kg). GENERAL EXAM:  · General Apparence: Patient is adequately groomed with no evidence of malnutrition. · Orientation: The patient is oriented to time, place and person. · Mood & Affect:The patient's mood and affect are appropriate   · Vascular: Examination reveals no swelling tenderness in upper or lower extremities. Good capillary refill  · Lymphatic: The lymphatic examination bilaterally reveals all areas to be without enlargement or induration  · Sensation: Sensation is intact without deficit  · Coordination/Balance: Good coordination. Tandem walking normal.     CERVICAL EXAMINATION:  · Inspection: Local inspection shows no step-off or bruising. Cervical alignment is normal.     · Palpation: No evidence of tenderness at the midline, and trapezius. Paraspinal tenderness is present. There is no step-off or paraspinal spasm. · Range of Motion: Cervical flexion, extension, and rotation are mildly reduced with pain.   · Strength: 5/5 bilateral upper extremities   · Special Tests:    ·  Spurling's negative & Waggoner's negative bilaterally. ·  Cubital and Carpal tunnel Tinel's negative bilaterally. · Skin:There are no rashes, ulcerations or lesions in right & left upper extremities. · Reflexes: Bilaterally triceps, biceps and brachioradialis are 2+. Clonus absent bilaterally at the feet. · Gait & station: normal, patient ambulates without assistance       · Additional Examinations:       · RIGHT UPPER EXTREMITY:  Inspection/examination of the right upper extremity does not show any tenderness, deformity or injury. Range of motion is unremarkable. There is no gross instability. There are no rashes, ulcerations or lesions. Strength and tone are normal.  · LEFT UPPER EXTREMITY: Inspection/examination of the left upper extremity does not show any tenderness, deformity or injury. Range of motion is unremarkable. There is no gross instability. There are no rashes, ulcerations or lesions. Strength and tone are normal.    Diagnostic Testing:  . MRI of the cervical spine that was obtained on 7/17/2021 was reviewed with the patient today which reveals  CONCLUSION:   1. Central protrusion type C2-3 disc herniation indenting the anterior aspect of thecal sac. 2. Central protrusion type C3-4 disc herniation indenting the anterior aspect of thecal sac. 3. Central protrusion type C4-5 disc herniation indenting the anterior aspect of thecal sac. 4. Broad central protrusion type C5-6 disc herniation indenting the anterior thecal sac.   5. Uncovertebral osteophytes producing moderate right mild left C3-4 and moderate bilateral    C5-6 foraminal stenosis. Impression:   Cervical spondylosis and spondylolisthesis  Cervical foraminal stenosis    Plan:    We discussed treatment options including observation, physical therapy, and additional cervical injections such as medial branch blocks or facet injections. We will proceed with additional injections.   I do not believe cervical surgery is indicated at this time.       Surjit Tsai MD    Follow-up: As needed

## 2021-10-27 ENCOUNTER — OFFICE VISIT (OUTPATIENT)
Dept: ORTHOPEDIC SURGERY | Age: 66
End: 2021-10-27
Payer: COMMERCIAL

## 2021-10-27 VITALS — BODY MASS INDEX: 36.58 KG/M2 | WEIGHT: 247 LBS | HEIGHT: 69 IN

## 2021-10-27 DIAGNOSIS — M25.522 LEFT ELBOW PAIN: Primary | ICD-10-CM

## 2021-10-27 DIAGNOSIS — M77.12 LATERAL EPICONDYLITIS OF LEFT ELBOW: ICD-10-CM

## 2021-10-27 PROCEDURE — 1036F TOBACCO NON-USER: CPT | Performed by: PHYSICIAN ASSISTANT

## 2021-10-27 PROCEDURE — 3017F COLORECTAL CA SCREEN DOC REV: CPT | Performed by: PHYSICIAN ASSISTANT

## 2021-10-27 PROCEDURE — 20551 NJX 1 TENDON ORIGIN/INSJ: CPT | Performed by: PHYSICIAN ASSISTANT

## 2021-10-27 PROCEDURE — G8417 CALC BMI ABV UP PARAM F/U: HCPCS | Performed by: PHYSICIAN ASSISTANT

## 2021-10-27 PROCEDURE — 4040F PNEUMOC VAC/ADMIN/RCVD: CPT | Performed by: PHYSICIAN ASSISTANT

## 2021-10-27 PROCEDURE — G8427 DOCREV CUR MEDS BY ELIG CLIN: HCPCS | Performed by: PHYSICIAN ASSISTANT

## 2021-10-27 PROCEDURE — G8484 FLU IMMUNIZE NO ADMIN: HCPCS | Performed by: PHYSICIAN ASSISTANT

## 2021-10-27 PROCEDURE — 99213 OFFICE O/P EST LOW 20 MIN: CPT | Performed by: PHYSICIAN ASSISTANT

## 2021-10-27 PROCEDURE — 1123F ACP DISCUSS/DSCN MKR DOCD: CPT | Performed by: PHYSICIAN ASSISTANT

## 2021-10-27 NOTE — PROGRESS NOTES
Subjective:      Patient ID: Aime Ly is a 74 y.o. male who is here for follow up evaluation of left lateral elbow pain. History of lateral epicondylitis. Last steroid injection 12/10/2020 with significant relief of elbow symptoms. Pain has gradually returned over the past 7 days. No history of injury. He has tried icing and anti-inflammatory medications with minimal improvement. Review Of Systems:   Negative for fever or chills. Negative for associated numbness or tingling left upper extremity.       Past Medical History:   Diagnosis Date    Arthritis     Chronic back pain     Diabetes mellitus ([address]     Fracture of nasal bone     Headache     Hearing loss     High blood pressure     Hyperlipidemia     Wears partial dentures        Family History   Problem Relation Age of Onset    Heart Disease Mother     High Blood Pressure Mother     Diabetes Mother     Heart Disease Father     High Blood Pressure Father     Diabetes Father        Past Surgical History:   Procedure Laterality Date   Aleks Pisanocha SURGERY  10/2007   1979  1980    BRONCHOSCOPY      CARPAL TUNNEL RELEASE Bilateral     COLONOSCOPY      ESOPHAGUS SURGERY      HIP ARTHROPLASTY Left 04/04/2017    HIP SURGERY  04/2008    bursa removed    JOINT REPLACEMENT  2009, 2006, 2003, 1997    Left knee replacement    KNEE SURGERY Left     [id number] scopes    SHOULDER ARTHROSCOPY Left 7/26/2019    LEFT SHOULDER ARTHROSCOPY, SUBACROMIAL DECOMPRESSION, ROTATOR CUFF REPAIR performed by Nisreen Francis MD at [address] History     Occupational History    Not on file   Tobacco Use    Smoking status: Never Smoker    Smokeless tobacco: Never Used   Vaping Use    Vaping Use: Never used   Substance and Sexual Activity    Alcohol use: No    Drug use: No    Sexual activity: Not Currently       Current Outpatient Medications   Medication Sig Dispense Refill    Pregabalin (LYRICA PO) Take by mouth      tiZANidine (ZANAFLEX) 4 MG tablet Take 4 mg by mouth 3 times daily      naloxone 4 MG/0.1ML LIQD nasal spray 1 spray by Nasal route as needed for Opioid Reversal 1 each 5    butalbital-APAP-caffeine (FIORICET) -40 MG CAPS per capsule Take 1 capsule by mouth every 6 hours as needed for Headaches 15 capsule 0    oxyCODONE-acetaminophen (PERCOCET) 5-325 MG per tablet Take 1 tablet by mouth every 8 hours as needed for Pain.  gabapentin (NEURONTIN) 800 MG tablet Take 800 mg by mouth 3 times daily.  oxybutynin (DITROPAN-XL) 10 MG extended release tablet       amitriptyline (ELAVIL) 75 MG tablet Take 75 mg by mouth nightly as needed       augmented betamethasone dipropionate (DIPROLENE-AF) 0.05 % cream Apply thin layer topically to L ankle every third day 45 g 1    metformin (GLUCOPHAGE) 500 MG tablet Take 500 mg by mouth 2 times daily (with meals) Takes only as needed      metoprolol (TOPROL-XL) 25 MG XL tablet Take 25 mg by mouth daily       zolpidem (AMBIEN) 5 MG tablet Take 5 mg by mouth nightly as needed       baclofen (LIORESAL) 10 MG tablet Take 10 mg by mouth as needed       methadone (DOLOPHINE) 5 MG tablet Take 5 mg by mouth 2 times daily.  Multiple Vitamins-Minerals (CENTRUM SILVER PO) Take  by mouth.  lisinopril-hydrochlorothiazide (PRINZIDE;ZESTORETIC) 20-25 MG per tablet Take 1 Tab by mouth daily.  atorvastatin (LIPITOR) 20 MG tablet Take 20 mg by mouth nightly        No current facility-administered medications for this visit. Objective:     He is alert, oriented x 3, pleasant, well nourished, developed and in no   acute distress. Ht 5' 9\" (1.753 m)   Wt 247 lb (112 kg)   BMI 36.48 kg/m²      Left Elbow Exam:  There is no obvious deformity. There is no effusion of the joint. There is full range of motion. There is no instability of the elbow. There is tenderness over the lateral epicondyle.   There is minimal swelling at the lateral Plan:  Medications- OTC NSAIDS discussed. He  was advised that NSAID-type medications have several important potential side effects: gastrointestinal irritation including hemorrhage, cardiac events and renal injuries. He was asked to take the medication with food and to stop if he experiences any GI upset. I asked him to call for vomiting, abdominal pain or black/bloody stools. He should have renal function testing per his medical provider periodically. He expresses understanding of these risks associated with NSAID use and questions were answered. PT- A home exercise program was instructed today including ROM exercises and strengthening exercises. The patient verbalized understanding of these exercises as well as the importance of the exercise program to promote return of normal function. If pain intensifies or other problems arise you are to notify the office. Further Imaging- not indicated at this time. Procedures-     PROCEDURE NOTE:  Pre op Diagnosis: Lateral Epicondylitis  Post op Diagnosis: Same  With Kate Minroger permission, his left lateral epicondylar region was prepped  in standard sterile fashion with  Alcohol and 2 cc of 0.25% Marcaine and 1 cc of Kenalog 40 mg was injected into the left lateral epicondylar region without difficulty. The patient tolerated this well without difficulty. A band-aid was applied. The patient was advised to ice the area for 15-20 minutes to relieve any injection site related pain. Follow up:   Call or return to clinic if these symptoms worsen or fail to improve as anticipated. Tino Gambino PA-C   Senior Physician Assistant   [facility]                                         Disclaimer: This note was generated with use of a verbal recognition program (DRAGON) and an attempt was made to check for errors.   It is possible that there are still dictated errors within this office note. If so, please bring any significant errors to my attention for an addendum. All efforts were made to ensure that this office note is accurate.

## 2021-12-03 ENCOUNTER — APPOINTMENT (OUTPATIENT)
Dept: GENERAL RADIOLOGY | Age: 66
End: 2021-12-03
Payer: COMMERCIAL

## 2021-12-03 ENCOUNTER — HOSPITAL ENCOUNTER (EMERGENCY)
Age: 66
Discharge: HOME | End: 2021-12-04
Payer: COMMERCIAL

## 2021-12-03 DIAGNOSIS — L53.9 LEG ERYTHEMA: ICD-10-CM

## 2021-12-03 DIAGNOSIS — M79.89 RIGHT LEG SWELLING: Primary | ICD-10-CM

## 2021-12-03 LAB
A/G RATIO: 1.7 (ref 1.1–2.2)
ALBUMIN SERPL-MCNC: 4.5 G/DL (ref 3.4–5)
ALP BLD-CCNC: 98 U/L (ref 40–129)
ALT SERPL-CCNC: 17 U/L (ref 10–40)
ANION GAP SERPL CALCULATED.3IONS-SCNC: 12 MMOL/L (ref 3–16)
AST SERPL-CCNC: 36 U/L (ref 15–37)
BASOPHILS ABSOLUTE: 0 K/UL (ref 0–0.2)
BASOPHILS RELATIVE PERCENT: 0.5 %
BILIRUB SERPL-MCNC: 0.9 MG/DL (ref 0–1)
BUN BLDV-MCNC: 18 MG/DL (ref 7–20)
CALCIUM SERPL-MCNC: 9.1 MG/DL (ref 8.3–10.6)
CHLORIDE BLD-SCNC: 101 MMOL/L (ref 99–110)
CO2: 28 MMOL/L (ref 21–32)
CREAT SERPL-MCNC: 1 MG/DL (ref 0.8–1.3)
D DIMER: 460 NG/ML DDU (ref 0–229)
EOSINOPHILS ABSOLUTE: 0.2 K/UL (ref 0–0.6)
EOSINOPHILS RELATIVE PERCENT: 4.2 %
GFR AFRICAN AMERICAN: >60
GFR NON-AFRICAN AMERICAN: >60
GLUCOSE BLD-MCNC: 112 MG/DL (ref 70–99)
HCT VFR BLD CALC: 34.2 % (ref 40.5–52.5)
HEMOGLOBIN: 11.1 G/DL (ref 13.5–17.5)
LYMPHOCYTES ABSOLUTE: 1.7 K/UL (ref 1–5.1)
LYMPHOCYTES RELATIVE PERCENT: 39 %
MCH RBC QN AUTO: 31.7 PG (ref 26–34)
MCHC RBC AUTO-ENTMCNC: 32.6 G/DL (ref 31–36)
MCV RBC AUTO: 97.2 FL (ref 80–100)
MONOCYTES ABSOLUTE: 0.5 K/UL (ref 0–1.3)
MONOCYTES RELATIVE PERCENT: 11.3 %
NEUTROPHILS ABSOLUTE: 1.9 K/UL (ref 1.7–7.7)
NEUTROPHILS RELATIVE PERCENT: 45 %
PDW BLD-RTO: 13.5 % (ref 12.4–15.4)
PLATELET # BLD: 166 K/UL (ref 135–450)
PMV BLD AUTO: 9.3 FL (ref 5–10.5)
POTASSIUM SERPL-SCNC: 3.7 MMOL/L (ref 3.5–5.1)
RBC # BLD: 3.51 M/UL (ref 4.2–5.9)
SODIUM BLD-SCNC: 141 MMOL/L (ref 136–145)
TOTAL PROTEIN: 7.1 G/DL (ref 6.4–8.2)
WBC # BLD: 4.3 K/UL (ref 4–11)

## 2021-12-03 PROCEDURE — 85379 FIBRIN DEGRADATION QUANT: CPT

## 2021-12-03 PROCEDURE — 80053 COMPREHEN METABOLIC PANEL: CPT

## 2021-12-03 PROCEDURE — 73590 X-RAY EXAM OF LOWER LEG: CPT

## 2021-12-03 PROCEDURE — 99284 EMERGENCY DEPT VISIT MOD MDM: CPT

## 2021-12-03 PROCEDURE — 85025 COMPLETE CBC W/AUTO DIFF WBC: CPT

## 2021-12-03 ASSESSMENT — ENCOUNTER SYMPTOMS
NAUSEA: 0
CHEST TIGHTNESS: 0
SHORTNESS OF BREATH: 0
ABDOMINAL PAIN: 0
VOMITING: 0
RESPIRATORY NEGATIVE: 1

## 2021-12-03 ASSESSMENT — PAIN DESCRIPTION - PAIN TYPE: TYPE: ACUTE PAIN

## 2021-12-03 ASSESSMENT — PAIN SCALES - GENERAL: PAINLEVEL_OUTOF10: 6

## 2021-12-03 ASSESSMENT — PAIN DESCRIPTION - LOCATION: LOCATION: FOOT

## 2021-12-04 ENCOUNTER — HOSPITAL ENCOUNTER (OUTPATIENT)
Dept: VASCULAR LAB | Age: 66
Discharge: HOME | End: 2021-12-04
Payer: COMMERCIAL

## 2021-12-04 VITALS
SYSTOLIC BLOOD PRESSURE: 125 MMHG | TEMPERATURE: 97.6 F | DIASTOLIC BLOOD PRESSURE: 86 MMHG | OXYGEN SATURATION: 96 % | RESPIRATION RATE: 16 BRPM | HEART RATE: 67 BPM

## 2021-12-04 DIAGNOSIS — M79.89 RIGHT LEG SWELLING: ICD-10-CM

## 2021-12-04 PROCEDURE — 6370000000 HC RX 637 (ALT 250 FOR IP): Performed by: PHYSICIAN ASSISTANT

## 2021-12-04 PROCEDURE — 93971 EXTREMITY STUDY: CPT

## 2021-12-04 RX ORDER — CEPHALEXIN 500 MG/1
500 CAPSULE ORAL 4 TIMES DAILY
Qty: 40 CAPSULE | Refills: 0 | Status: SHIPPED | OUTPATIENT
Start: 2021-12-04 | End: 2021-12-14

## 2021-12-04 RX ORDER — CEPHALEXIN 500 MG/1
500 CAPSULE ORAL ONCE
Status: COMPLETED | OUTPATIENT
Start: 2021-12-04 | End: 2021-12-04

## 2021-12-04 RX ORDER — SULFAMETHOXAZOLE AND TRIMETHOPRIM 800; 160 MG/1; MG/1
1 TABLET ORAL ONCE
Status: COMPLETED | OUTPATIENT
Start: 2021-12-04 | End: 2021-12-04

## 2021-12-04 RX ORDER — SULFAMETHOXAZOLE AND TRIMETHOPRIM 800; 160 MG/1; MG/1
1 TABLET ORAL 2 TIMES DAILY
Qty: 20 TABLET | Refills: 0 | Status: SHIPPED | OUTPATIENT
Start: 2021-12-04 | End: 2021-12-14

## 2021-12-04 RX ADMIN — CEPHALEXIN 500 MG: 500 CAPSULE ORAL at 01:27

## 2021-12-04 RX ADMIN — SULFAMETHOXAZOLE AND TRIMETHOPRIM 1 TABLET: 800; 160 TABLET ORAL at 01:27

## 2021-12-04 RX ADMIN — APIXABAN 80 MG: 5 TABLET, FILM COATED ORAL at 01:28

## 2021-12-04 RX ADMIN — APIXABAN 10 MG: 5 TABLET, FILM COATED ORAL at 01:27

## 2021-12-04 NOTE — ED NOTES
.Pt discharged at this time. Discharge instructions and medications reviewed,  Questions were answered. PT verbalized understanding. .  Follow up appointments were discussed.          [facility] EarlWell[facility]  12/04/21 [id number]

## 2021-12-04 NOTE — ED PROVIDER NOTES
[address]  [address] [address]  Dept: [phone]  Loc: [phone]  eMERGENCYdEPARTMENT eNCOUnter      Pt Name: Burns Shone  MRN: [id number]  [place] 1955  Date of evaluation: 12/3/2021  Provider:Ivone Lee PA-C    CHIEF COMPLAINT       Chief Complaint   Patient presents with    Leg Swelling     right foot,noticed tonight. states he has a knot on his calf. CRITICAL CARE TIME   Total Critical Care time was 0 minutes, excluding separately reportable procedures. There was a high probability of clinically significant/life threatening deterioration in the patient's condition which required my urgentintervention. HISTORY OF PRESENT ILLNESS  (Location/Symptom, Timing/Onset, Context/Setting, Quality, Duration,Modifying Factors, Severity.)   Burns Shone is a 74 y.o. male who presents to the emergency department by private vehicle complaining of right leg pain and swelling. Patient states this evening he began feeling a tightness throughout his right lower leg. At that time he noticed he had swelling throughout his right lower leg. States he can feel a knot in his right calf. Given symptoms sought evaluation in the ED. He has pain rated as 6/10 in severity. He denies any fevers, chills, nausea, vomiting. Denies any chest pain, shortness of breath. No history of blood clots, recent travel/hospitalization/surgeries. Nursing Notes were reviewedand agreed with or any disagreements were addressed in the HPI. REVIEW OF SYSTEMS    (2-9 systems for level 4, 10 or more for level 5)     Review of Systems   Constitutional: Negative for chills and fever. HENT: Negative. Respiratory: Negative. Negative for chest tightness and shortness of breath. Cardiovascular: Positive for leg swelling. Gastrointestinal: Negative for abdominal pain, nausea and vomiting.    Musculoskeletal: Negative for arthralgias and myalgias. Neurological: Negative. Psychiatric/Behavioral: Negative for behavioral problems and confusion. Except as noted above the remainder of the review of systems was reviewed and negative. PAST MEDICAL HISTORY         Diagnosis Date    Arthritis     Chronic back pain     Diabetes mellitus ([facility]     Fracture of nasal bone     Headache     Hearing loss     High blood pressure     Hyperlipidemia     Wears partial dentures        SURGICAL HISTORY           Procedure Laterality Date    APPENDECTOMY      BACK SURGERY  10/2007   1979  1980    BRONCHOSCOPY      CARPAL TUNNEL RELEASE Bilateral     COLONOSCOPY      ESOPHAGUS SURGERY      HIP ARTHROPLASTY Left 2017    HIP SURGERY  2008    bursa removed    JOINT REPLACEMENT  , , ,     Left knee replacement    KNEE SURGERY Left     [id number] scopes    SHOULDER ARTHROSCOPY Left 2019    LEFT SHOULDER ARTHROSCOPY, SUBACROMIAL DECOMPRESSION, ROTATOR CUFF REPAIR performed by Luisa Osborne MD at [facility] 2     [unfilled]    ALLERGIES     Prochlorperazine    FAMILY HISTORY           Problem Relation Age of Onset    Heart Disease Mother     High Blood Pressure Mother     Diabetes Mother     Heart Disease Father     High Blood Pressure Father     Diabetes Father      Family Status   Relation Name Status    Mother      Father          SOCIAL HISTORY      reports that he has never smoked. He has never used smokeless tobacco. He reports that he does not drink alcohol and does not use drugs. PHYSICAL EXAM    (up to 7 for level 4, 8 or more for level 5)     ED Triage Vitals [21]   Enc Vitals Group      /81      Pulse 67      Resp 16      Temp 97.6 °F (36.4 °C)      Temp src       SpO2 100 %      Weight       Height       Head Circumference       Peak Flow       Pain Score       Pain Loc       Pain Edu? Excl. in [address]? Physical Exam  Vitals reviewed. Constitutional:       Appearance: Normal appearance. HENT:      Head: Normocephalic and atraumatic. Pulmonary:      Effort: Pulmonary effort is normal. No respiratory distress. Musculoskeletal:         General: Normal range of motion. Cervical back: Normal range of motion and neck supple. Comments: RLE: Patient presents ED with HPI noted above. He has generalized right lower extremity edema distal to knee when compared bilaterally, he has erythema throughout distal two thirds of lower leg. He has tenderness throughout this region. Pedal pulse +2, sensation intact distally, no open wounds noted. Skin:     General: Skin is warm. Neurological:      General: No focal deficit present. Mental Status: He is alert and oriented to person, place, and time. Psychiatric:         Mood and Affect: Mood normal.         Behavior: Behavior normal.           DIAGNOSTIC RESULTS     EKG: All EKG's are interpreted by the Emergency Department Physician who either signs or Co-signs this chart in the absence of a cardiologist.    RADIOLOGY:   Non-plain film images such as CT, Ultrasound and MRI are read by the radiologist. Plain radiographic images are visualized and preliminarilyinterpreted by the emergency physician with the below findings:    Interpretation per the Radiologist below,if available at the time of this note:    XR TIBIA FIBULA RIGHT (2 VIEWS)   Final Result   No significant finding of the right tibia or fibula.          VL Extremity Venous Right    (Results Pending)         LABS:  Labs Reviewed   CBC WITH AUTO DIFFERENTIAL - Abnormal; Notable for the following components:       Result Value    RBC 3.51 (*)     Hemoglobin 11.1 (*)     Hematocrit 34.2 (*)     All other components within normal limits    Narrative:     Performed at:  [address] [address]   Phone [phone] COMPREHENSIVE METABOLIC PANEL - Abnormal; Notable for the following components:    Glucose 112 (*)     All other components within normal limits    Narrative:     Performed at:  [facility]  [address] [facility] [address]   Phone [phone]   D-DIMER, QUANTITATIVE - Abnormal; Notable for the following components:    D-Dimer, Quant 460 (*)     All other components within normal limits    Narrative:     Performed at:  [facility]  [address] [facility] [address]   Phone [phone]       All other labs were within normal range or not returned as of this dictation. EMERGENCY DEPARTMENT COURSE and DIFFERENTIAL DIAGNOSIS/MDM:   Vitals:    Vitals:    12/03/21 1923 12/04/21 0116   BP: 135/81 125/86   Pulse: 67    Resp: 16    Temp: 97.6 °F (36.4 °C)    SpO2: 100% 96%       MDM     Patient presents ED with HPI noted above. He is hemodynamically stable, afebrile and nontoxic-appearing. He is hypoxic with oxygen saturation of 100% on room air. Different diagnosis includes DVT, cellulitis, necrotizing fasciitis    X-ray obtained. X-ray showed no subcutaneous gas or significant findings. He has no leukocytosis and afebrile. He is diabetic, will cover with antibiotics for cellulitis, first dose is given in the ED. Clinical concern for DVT. D-dimer elevated, patient placed on anticoagulant and is to have outpatient venous Doppler tomorrow. Risk factors reviewed prior to anticoagulation administration, patient understands he is at increased risk for bleeding given anticoagulant. First dose given in the ED. Patient educated on return precautions and need for close follow-up and evaluation regarding cellulitis if DVT study negative. He is comfortable plan. He was discharged home in stable condition. The patient tolerated their visit well.  I saw the patient independently with physician available for consultation as needed. I have discussed the findings of today's workup with the patient and addressed the patient's questions and concerns. Important warning signs as well as new or worsening symptoms which would necessitate immediate return to the ED were discussed. The plan is to discharge from the ED at this time, and the patient is in stable condition. The patient acknowledged understanding is agreeable with this plan. CONSULTS:  None    PROCEDURES:  Procedures    FINAL IMPRESSION      1. Right leg swelling    2.  Leg erythema          DISPOSITION/PLAN   [unfilled]    PATIENT REFERRED TO:  [facility] Emergency Department  [address] [address]  [phone]  Go to   If symptoms worsen    Marlon Navarro MD  [facility]  [place] [address]  [phone]    Schedule an appointment as soon as possible for a visit in 3 days  For follow up, reevaluation and repeat exam.      DISCHARGE MEDICATIONS:  Discharge Medication List as of 12/4/2021  1:31 AM      START taking these medications    Details   cephALEXin (KEFLEX) 500 MG capsule Take 1 capsule by mouth 4 times daily for 10 days, Disp-40 capsule, R-0Normal      sulfamethoxazole-trimethoprim (BACTRIM DS) 800-160 MG per tablet Take 1 tablet by mouth 2 times daily for 10 days, Disp-20 tablet, R-0Normal             (Please note that portions of this note were completed with a voice recognition program.  Efforts were made to edit the dictations but occasionally words are mis-transcribed.)    Meryle Canavan, PA-C Meryle Canavan, [place]  12/04/21 0148

## 2021-12-04 NOTE — ED TRIAGE NOTES
Klaus Luis Eduardo Kj Uribe is a 74 y.o. male brought himself to the ER for eval of right lower leg redness and pain. The patient is alert an oriented with an open and patent airway.

## 2021-12-23 ENCOUNTER — OFFICE VISIT (OUTPATIENT)
Dept: SURGERY | Age: 66
End: 2021-12-23
Payer: COMMERCIAL

## 2021-12-23 VITALS — WEIGHT: 251.6 LBS | BODY MASS INDEX: 37.15 KG/M2 | DIASTOLIC BLOOD PRESSURE: 64 MMHG | SYSTOLIC BLOOD PRESSURE: 118 MMHG

## 2021-12-23 DIAGNOSIS — I87.2 VENOUS STASIS DERMATITIS OF BOTH LOWER EXTREMITIES: Primary | ICD-10-CM

## 2021-12-23 PROCEDURE — G8427 DOCREV CUR MEDS BY ELIG CLIN: HCPCS | Performed by: STUDENT IN AN ORGANIZED HEALTH CARE EDUCATION/TRAINING PROGRAM

## 2021-12-23 PROCEDURE — G8417 CALC BMI ABV UP PARAM F/U: HCPCS | Performed by: STUDENT IN AN ORGANIZED HEALTH CARE EDUCATION/TRAINING PROGRAM

## 2021-12-23 PROCEDURE — 1036F TOBACCO NON-USER: CPT | Performed by: STUDENT IN AN ORGANIZED HEALTH CARE EDUCATION/TRAINING PROGRAM

## 2021-12-23 PROCEDURE — 4040F PNEUMOC VAC/ADMIN/RCVD: CPT | Performed by: STUDENT IN AN ORGANIZED HEALTH CARE EDUCATION/TRAINING PROGRAM

## 2021-12-23 PROCEDURE — 1123F ACP DISCUSS/DSCN MKR DOCD: CPT | Performed by: STUDENT IN AN ORGANIZED HEALTH CARE EDUCATION/TRAINING PROGRAM

## 2021-12-23 PROCEDURE — 99203 OFFICE O/P NEW LOW 30 MIN: CPT | Performed by: STUDENT IN AN ORGANIZED HEALTH CARE EDUCATION/TRAINING PROGRAM

## 2021-12-23 PROCEDURE — G8484 FLU IMMUNIZE NO ADMIN: HCPCS | Performed by: STUDENT IN AN ORGANIZED HEALTH CARE EDUCATION/TRAINING PROGRAM

## 2021-12-23 PROCEDURE — 3017F COLORECTAL CA SCREEN DOC REV: CPT | Performed by: STUDENT IN AN ORGANIZED HEALTH CARE EDUCATION/TRAINING PROGRAM

## 2021-12-23 RX ORDER — PREGABALIN 75 MG/1
CAPSULE ORAL
COMMUNITY
Start: 2021-12-02 | End: 2021-12-23

## 2021-12-23 RX ORDER — BLOOD SUGAR DIAGNOSTIC
STRIP MISCELLANEOUS
Status: ON HOLD | COMMUNITY
Start: 2021-09-30 | End: 2022-08-05

## 2021-12-23 RX ORDER — ASPIRIN 81 MG/1
81 TABLET ORAL
COMMUNITY
End: 2022-09-23

## 2021-12-23 RX ORDER — GLIPIZIDE 5 MG/1
5 TABLET ORAL DAILY PRN
COMMUNITY
Start: 2021-12-01 | End: 2022-08-24

## 2021-12-23 RX ORDER — NABUMETONE 750 MG/1
750 TABLET, FILM COATED ORAL 2 TIMES DAILY
COMMUNITY
Start: 2021-12-03

## 2021-12-23 ASSESSMENT — ENCOUNTER SYMPTOMS
SHORTNESS OF BREATH: 0
COLOR CHANGE: 1

## 2021-12-23 NOTE — PROGRESS NOTES
increased amount of staining as well as a new breakdown of skin on his left medial distal calf over the last couple of days. He presented to the emergency room earlier this month for right leg swelling. He had no evidence of DVT at that time. He was not discharged on anticoagulation. He was discharged with the recommendation to take stockings and also some antibiotics. Once again he denies any fever chills or anything associated from a cellulitic perspective. He does endorse residual calf and anterior shin pain. His stockings are old and frayed. He denies any chest pain or shortness of breath. No significant interval weight change. Review of Systems   Constitutional: Negative for chills and fever. Respiratory: Negative for shortness of breath. Cardiovascular: Positive for leg swelling. Negative for chest pain. Musculoskeletal: Positive for myalgias. Skin: Positive for color change. Neurological: Negative for weakness and numbness. Hematological: Does not bruise/bleed easily. Psychiatric/Behavioral: Negative for agitation. Objective   Physical Exam  Cardiovascular:      Rate and Rhythm: Normal rate and regular rhythm. Pulses: Normal pulses. Pulmonary:      Effort: Pulmonary effort is normal. No respiratory distress. Musculoskeletal:         General: Tenderness (both calves and shins) present. Normal range of motion. Right lower leg: No edema. Left lower leg: No edema. Skin:     General: Skin is warm and dry. Capillary Refill: Capillary refill takes less than 2 seconds. Comments: Hemosiderin staining of her both calves. There is staining also at both medial malleolus. The left is worse than the right. There is also a circumscribed dime shaped area of eschar on the medial aspect of his distal calf just proximal to the medial malleolus on the left. No evidence of open draining wounds bilaterally.    Neurological:      General: No focal deficit present. Mental Status: He is alert. Psychiatric:         Mood and Affect: Mood normal.         Behavior: Behavior normal.         Thought Content: Thought content normal.         Judgment: Judgment normal.            On this date 12/23/2021 I have spent 32 minutes reviewing previous notes, test results and face to face with the patient discussing the diagnosis and importance of compliance with the treatment plan as well as documenting on the day of the visit. Sofia Hewitt DO, [address] [facility]    This document was dictated using voice recognition software.

## 2022-01-14 ENCOUNTER — TELEPHONE (OUTPATIENT)
Dept: VASCULAR SURGERY | Age: 67
End: 2022-01-14

## 2022-01-14 NOTE — TELEPHONE ENCOUNTER
. Zane Flores says his legs hurt all the time, with or without stockings. He says he tried the new stockings, and he also says he has been trying to elevate his legs as well. He is going to see Dr. Michelle Pride next week.

## 2022-01-14 NOTE — TELEPHONE ENCOUNTER
Patient called with concerns about left calf pain. He was in to see Dr [address] a few weeks ago and was prescribed compression stockings in which he picked up the next day. Reports constant left calf pain and the calf is hard. Also has bilateral ankle pain that \"feels like someone is holding a drill to the back of my ankles\". Informed that Dr Kim Becker is out of the office and Dr Yadiel Golden is the covering provider.

## 2022-01-18 ENCOUNTER — OFFICE VISIT (OUTPATIENT)
Dept: ORTHOPEDIC SURGERY | Age: 67
End: 2022-01-18
Payer: COMMERCIAL

## 2022-01-18 VITALS — WEIGHT: 251 LBS | HEIGHT: 69 IN | BODY MASS INDEX: 37.18 KG/M2

## 2022-01-18 DIAGNOSIS — M77.12 LATERAL EPICONDYLITIS OF LEFT ELBOW: Primary | ICD-10-CM

## 2022-01-18 DIAGNOSIS — M25.522 LEFT ELBOW PAIN: ICD-10-CM

## 2022-01-18 DIAGNOSIS — G56.02 CARPAL TUNNEL SYNDROME OF LEFT WRIST: ICD-10-CM

## 2022-01-18 PROCEDURE — 3017F COLORECTAL CA SCREEN DOC REV: CPT | Performed by: PHYSICIAN ASSISTANT

## 2022-01-18 PROCEDURE — G8427 DOCREV CUR MEDS BY ELIG CLIN: HCPCS | Performed by: PHYSICIAN ASSISTANT

## 2022-01-18 PROCEDURE — G8484 FLU IMMUNIZE NO ADMIN: HCPCS | Performed by: PHYSICIAN ASSISTANT

## 2022-01-18 PROCEDURE — 4040F PNEUMOC VAC/ADMIN/RCVD: CPT | Performed by: PHYSICIAN ASSISTANT

## 2022-01-18 PROCEDURE — G8417 CALC BMI ABV UP PARAM F/U: HCPCS | Performed by: PHYSICIAN ASSISTANT

## 2022-01-18 PROCEDURE — 1036F TOBACCO NON-USER: CPT | Performed by: PHYSICIAN ASSISTANT

## 2022-01-18 PROCEDURE — 99214 OFFICE O/P EST MOD 30 MIN: CPT | Performed by: PHYSICIAN ASSISTANT

## 2022-01-18 PROCEDURE — 1123F ACP DISCUSS/DSCN MKR DOCD: CPT | Performed by: PHYSICIAN ASSISTANT

## 2022-01-18 NOTE — PROGRESS NOTES
Subjective:      Patient ID: Pretty Domingo is a 74 y.o. male who is here for follow up evaluation of his left elbow pain. He was treated for lateral epicondylitis on 10/27/2021 with a lateral epicondylar steroid injection. The steroid injection provided moderate pain relief. His symptoms have gradually returned over the last week or so. He denies any new injury. He reports pain over the lateral epicondylar region with pain radiating down into his forearm and hand as well as pain radiating up from the elbow towards his shoulder and lateral neck. He denies any neck pain at the present time. He denies any perceived weakness in the left upper extremity. He does report occasional numbness and/or tingling symptoms in the LUE. Review Of Systems:   Negative for fever or chills.      Past Medical History:   Diagnosis Date    Arthritis     Chronic back pain     Diabetes mellitus ([facility] [address]     Fracture of nasal bone     Headache     Hearing loss     High blood pressure     Hyperlipidemia     Wears partial dentures        Family History   Problem Relation Age of Onset    Heart Disease Mother     High Blood Pressure Mother     Diabetes Mother     Heart Disease Father     High Blood Pressure Father     Diabetes Father        Past Surgical History:   Procedure Laterality Date   Glenice Curb SURGERY  10/2007   1979  1980    BRONCHOSCOPY      CARPAL TUNNEL RELEASE Bilateral     COLONOSCOPY      ESOPHAGUS SURGERY      HIP ARTHROPLASTY Left 04/04/2017    HIP SURGERY  04/2008    bursa removed    JOINT REPLACEMENT  2009, 2006, 2003, 1997    Left knee replacement    KNEE SURGERY Left     [id number] scopes    SHOULDER ARTHROSCOPY Left 7/26/2019    LEFT SHOULDER ARTHROSCOPY, SUBACROMIAL DECOMPRESSION, ROTATOR CUFF REPAIR performed by Jaylin Felix MD at [address] History     Occupational History    Not on file   Tobacco Use    Smoking status: Never Smoker  Smokeless tobacco: Never Used   Vaping Use    Vaping Use: Never used   Substance and Sexual Activity    Alcohol use: No    Drug use: No    Sexual activity: Not Currently       Current Outpatient Medications   Medication Sig Dispense Refill    MULTIPLE VITAMIN PO Take by mouth      aspirin 81 MG EC tablet Take 81 mg by mouth      glipiZIDE (GLUCOTROL) 5 MG tablet       ONETOUCH ULTRA strip       nabumetone (RELAFEN) 750 MG tablet       tiZANidine (ZANAFLEX) 4 MG tablet Take 4 mg by mouth 3 times daily      naloxone 4 MG/0.1ML LIQD nasal spray 1 spray by Nasal route as needed for Opioid Reversal 1 each 5    butalbital-APAP-caffeine (FIORICET) -40 MG CAPS per capsule Take 1 capsule by mouth every 6 hours as needed for Headaches 15 capsule 0    oxyCODONE-acetaminophen (PERCOCET) 5-325 MG per tablet Take 1 tablet by mouth every 8 hours as needed for Pain.  oxybutynin (DITROPAN-XL) 10 MG extended release tablet       amitriptyline (ELAVIL) 75 MG tablet Take 75 mg by mouth nightly as needed       augmented betamethasone dipropionate (DIPROLENE-AF) 0.05 % cream Apply thin layer topically to L ankle every third day 45 g 1    metformin (GLUCOPHAGE) 500 MG tablet Take 500 mg by mouth 2 times daily (with meals) Takes only as needed      metoprolol (TOPROL-XL) 25 MG XL tablet Take 25 mg by mouth daily       zolpidem (AMBIEN) 5 MG tablet Take 5 mg by mouth nightly as needed       baclofen (LIORESAL) 10 MG tablet Take 10 mg by mouth as needed       methadone (DOLOPHINE) 5 MG tablet Take 5 mg by mouth 2 times daily.  Multiple Vitamins-Minerals (CENTRUM SILVER PO) Take  by mouth.  lisinopril-hydrochlorothiazide (PRINZIDE;ZESTORETIC) 20-25 MG per tablet Take 1 Tab by mouth daily.  atorvastatin (LIPITOR) 20 MG tablet Take 20 mg by mouth nightly        No current facility-administered medications for this visit.          Objective:     He is alert, oriented x 3, pleasant, well nourished, developed and in no   acute distress. Ht 5' 9\" (1.753 m)   Wt 251 lb (113.9 kg)   BMI 37.07 kg/m²      Left Elbow Exam:  There is no obvious deformity. There is no effusion of the joint. There is full range of motion. There is no instability of the elbow. There is tenderness over the lateral epicondyle. There is minimal swelling at the lateral epicondyle. Pain is with resisted wrist extension and pronation.     Exam of the left wrist:  There is no obvious deformity noted. The range of motion of the hand and wrist is normal.  There is not atrophy of the thenar aspect of the hand. There is not loss of normal sensation in the medial distribution of the hand. Phalen's test is positive. Tinel's test is positive. Capillary refill is less than 2 seconds and there is no cyanosis of the digits. Skin is warm, dry and intact without abrasion, laceration or contusion. Cervical Spine Exam:  Examination of the cervical spine reveals no bony tenderness, no muscular spasm. The range of motion is full and without pain. Spurling test is negative for reproduction of radicular pain. Upper extremities:  He has 5/5 strength of his interosseous muscles, wrist dorsiflexors and volarflexors, biceps, triceps, deltoids, and internal and external rotators of his shoulders, bilaterally. His biceps, triceps, bracheoradialis, quadriceps and achilles reflexes are 2+, bilaterally. Sensation is intact to light touchfrom C6 to C8. He has no clonus and negative Waggoner's bilaterally. Examination of the upper extremities shows intact perfusion to all extremities. He has no cyanosis and digigts are warm to touch, capillary refill is less than 2 seconds. He has no edema noted. He has intact skin without lacerations or abrasions, no significant erythema, rashes or skin lesions.                   X Rays: not performed in the office today:   I did go back and review x-rays from 10/27/2021. After some manipulation of the x-ray on digital screen he does have periarticular calcifications over the lateral upper condyle. No destruction of cortical bone. Diagnosis:       ICD-10-CM    1. Lateral epicondylitis of left elbow  M77.12 MRI ELBOW LEFT WO CONTRAST   2. Carpal tunnel syndrome of left wrist  G56.02 Coco Cameron MD (Inpatient and Outpatient EMG), [facility]   3. Left elbow pain  M25.522 MRI ELBOW LEFT WO CONTRAST        Assessment and Plan:       Assessment:  Left elbow pain originally felt to be lateral epicondylitis. He did get moderate to significant improvement of his elbow symptoms after lateral epicondylar steroid injection on 10/27/2021. Symptoms have returned. He has more of a cervical radicular or carpal tunnel presentation at this time. I had an extensive discussion with Mr. Karen Dumont regarding the natural history, etiology, and long term consequences of his condition. I have presented reasonable alternatives to the patient's proposed care, treatment, and services. Risks and benefits of the treatment options also reviewed in detail. I have outlined a treatment plan with them. He has had full opportunity to ask his questions. I have answered them all to his satisfaction. I feel that Mr. Karen Dumont understands our discussion today. Plan:  Medications-   OTC NSAIDS discussed. He  was advised that NSAID-type medications have several important potential side effects: gastrointestinal irritation including hemorrhage, cardiac events and renal injuries. He was asked to take the medication with food and to stop if he experiences any GI upset. I asked him to call for vomiting, abdominal pain or black/bloody stools. He should have renal function testing per his medical provider periodically. He expresses understanding of these risks associated with NSAID use and questions were answered.       PT-he has been doing a home exercise program previously instructed. Further Imaging-MRI of the left elbow to evaluate the periarticular calcifications to be sure this is not a neoplasm. EMG left upper extremity. He will follow-up after MRI and EMG to discuss results and further treatment options. Call or return to clinic if these symptoms worsen or fail to improve as anticipated. Beth Conte PA-C   Senior Physician Assistant   [facility]                                         Disclaimer: This note was generated with use of a verbal recognition program (DRAGON) and an attempt was made to check for errors. It is possible that there are still dictated errors within this office note. If so, please bring any significant errors to my attention for an addendum. All efforts were made to ensure that this office note is accurate.

## 2022-01-20 ENCOUNTER — OFFICE VISIT (OUTPATIENT)
Dept: VASCULAR SURGERY | Age: 67
End: 2022-01-20
Payer: COMMERCIAL

## 2022-01-20 ENCOUNTER — PROCEDURE VISIT (OUTPATIENT)
Dept: NEUROLOGY | Age: 67
End: 2022-01-20
Payer: COMMERCIAL

## 2022-01-20 VITALS
WEIGHT: 253 LBS | BODY MASS INDEX: 37.47 KG/M2 | HEART RATE: 90 BPM | HEIGHT: 69 IN | DIASTOLIC BLOOD PRESSURE: 68 MMHG | SYSTOLIC BLOOD PRESSURE: 130 MMHG

## 2022-01-20 DIAGNOSIS — R20.0 LEFT ARM NUMBNESS: Primary | ICD-10-CM

## 2022-01-20 DIAGNOSIS — I87.2 VENOUS STASIS DERMATITIS OF BOTH LOWER EXTREMITIES: Primary | ICD-10-CM

## 2022-01-20 PROCEDURE — 95909 NRV CNDJ TST 5-6 STUDIES: CPT | Performed by: PSYCHIATRY & NEUROLOGY

## 2022-01-20 PROCEDURE — 95886 MUSC TEST DONE W/N TEST COMP: CPT | Performed by: PSYCHIATRY & NEUROLOGY

## 2022-01-20 PROCEDURE — 99213 OFFICE O/P EST LOW 20 MIN: CPT | Performed by: STUDENT IN AN ORGANIZED HEALTH CARE EDUCATION/TRAINING PROGRAM

## 2022-01-20 PROCEDURE — 4040F PNEUMOC VAC/ADMIN/RCVD: CPT | Performed by: STUDENT IN AN ORGANIZED HEALTH CARE EDUCATION/TRAINING PROGRAM

## 2022-01-20 PROCEDURE — 1036F TOBACCO NON-USER: CPT | Performed by: STUDENT IN AN ORGANIZED HEALTH CARE EDUCATION/TRAINING PROGRAM

## 2022-01-20 PROCEDURE — 1123F ACP DISCUSS/DSCN MKR DOCD: CPT | Performed by: STUDENT IN AN ORGANIZED HEALTH CARE EDUCATION/TRAINING PROGRAM

## 2022-01-20 PROCEDURE — G8484 FLU IMMUNIZE NO ADMIN: HCPCS | Performed by: STUDENT IN AN ORGANIZED HEALTH CARE EDUCATION/TRAINING PROGRAM

## 2022-01-20 PROCEDURE — G8417 CALC BMI ABV UP PARAM F/U: HCPCS | Performed by: STUDENT IN AN ORGANIZED HEALTH CARE EDUCATION/TRAINING PROGRAM

## 2022-01-20 PROCEDURE — G8428 CUR MEDS NOT DOCUMENT: HCPCS | Performed by: STUDENT IN AN ORGANIZED HEALTH CARE EDUCATION/TRAINING PROGRAM

## 2022-01-20 PROCEDURE — 3017F COLORECTAL CA SCREEN DOC REV: CPT | Performed by: STUDENT IN AN ORGANIZED HEALTH CARE EDUCATION/TRAINING PROGRAM

## 2022-01-20 RX ORDER — PREGABALIN 75 MG/1
75 CAPSULE ORAL 3 TIMES DAILY
COMMUNITY
Start: 2022-01-03

## 2022-01-20 ASSESSMENT — ENCOUNTER SYMPTOMS
SHORTNESS OF BREATH: 0
COLOR CHANGE: 1

## 2022-01-20 NOTE — PROGRESS NOTES
Sapphire Perkins (:  1955) is a 74 y.o. male,Established patient, here for evaluation of the following chief complaint(s):  No chief complaint on file. ASSESSMENT/PLAN:  1. Venous stasis dermatitis of both lower extremities         This is a 53-year-old male patient presents the office today for evaluation of bilateral lower extremity venous stasis. Would recommend venous reflux study at this time. He has been trying his compression stockings but has not gotten any noticeable improvement. He still probably needs some more time with conservative therapy. However given his increase of severity and symptoms I think it is reasonable to go ahead and perform the reflux study and see if we can treat this problem. All questions answered. We will order the duplex and follow-up from there. Subjective   SUBJECTIVE/OBJECTIVE:  This is a 53-year-old male patient presents the office today for repeat evaluation of venous stasis dermatitis. He has significant hemosiderin staining prickly on the medial aspects of both his legs. He did  some stockings and has been wearing them diligently. However he states that sometimes his pain is increased as a consequence of the stockings themselves. He denies any rest pain. He does endorse pain mostly in his calf as well as his medial malleolus area on both lower extremities. No fever or chills. He did state that his leg subjectively has felt warm over the last couple of days. However no obvious signs of cellulitis. No open or draining wounds. Review of Systems   Constitutional: Negative for chills and fever. Respiratory: Negative for shortness of breath. Cardiovascular: Positive for leg swelling. Negative for chest pain. Musculoskeletal: Positive for gait problem and myalgias. Skin: Positive for color change. Negative for wound. Neurological: Negative for weakness. Hematological: Does not bruise/bleed easily.

## 2022-01-24 ENCOUNTER — TELEPHONE (OUTPATIENT)
Dept: ORTHOPEDIC SURGERY | Age: 67
End: 2022-01-24

## 2022-01-24 DIAGNOSIS — F40.240 CLAUSTROPHOBIA: Primary | ICD-10-CM

## 2022-01-24 RX ORDER — DIAZEPAM 10 MG/1
10 TABLET ORAL PRN
Qty: 2 TABLET | Refills: 0 | Status: SHIPPED | OUTPATIENT
Start: 2022-01-24 | End: 2022-01-31

## 2022-01-24 NOTE — TELEPHONE ENCOUNTER
Prescription Refill     Medication Name:  Aki MAURICE [address]: [address]  Patient Contact Number:  [phone]

## 2022-01-25 ENCOUNTER — HOSPITAL ENCOUNTER (OUTPATIENT)
Dept: MRI IMAGING | Age: 67
Discharge: HOME | End: 2022-01-25
Payer: COMMERCIAL

## 2022-01-25 ENCOUNTER — PROCEDURE VISIT (OUTPATIENT)
Dept: SURGERY | Age: 67
End: 2022-01-25
Payer: COMMERCIAL

## 2022-01-25 DIAGNOSIS — M77.12 LATERAL EPICONDYLITIS OF LEFT ELBOW: ICD-10-CM

## 2022-01-25 DIAGNOSIS — M79.604 PAIN IN BOTH LOWER EXTREMITIES: ICD-10-CM

## 2022-01-25 DIAGNOSIS — M79.605 PAIN IN BOTH LOWER EXTREMITIES: ICD-10-CM

## 2022-01-25 DIAGNOSIS — I87.2 VENOUS STASIS DERMATITIS OF BOTH LOWER EXTREMITIES: Primary | ICD-10-CM

## 2022-01-25 DIAGNOSIS — M25.522 LEFT ELBOW PAIN: ICD-10-CM

## 2022-01-25 PROCEDURE — 73221 MRI JOINT UPR EXTREM W/O DYE: CPT

## 2022-01-25 PROCEDURE — 93970 EXTREMITY STUDY: CPT | Performed by: SURGERY

## 2022-02-02 ENCOUNTER — OFFICE VISIT (OUTPATIENT)
Dept: ORTHOPEDIC SURGERY | Age: 67
End: 2022-02-02
Payer: COMMERCIAL

## 2022-02-02 VITALS — BODY MASS INDEX: 44.83 KG/M2 | WEIGHT: 253 LBS | RESPIRATION RATE: 18 BRPM | HEIGHT: 63 IN

## 2022-02-02 DIAGNOSIS — M77.12 LATERAL EPICONDYLITIS OF LEFT ELBOW: Primary | ICD-10-CM

## 2022-02-02 DIAGNOSIS — M75.82 ROTATOR CUFF TENDONITIS, LEFT: ICD-10-CM

## 2022-02-02 PROCEDURE — G8484 FLU IMMUNIZE NO ADMIN: HCPCS | Performed by: PHYSICIAN ASSISTANT

## 2022-02-02 PROCEDURE — 4040F PNEUMOC VAC/ADMIN/RCVD: CPT | Performed by: PHYSICIAN ASSISTANT

## 2022-02-02 PROCEDURE — 1036F TOBACCO NON-USER: CPT | Performed by: PHYSICIAN ASSISTANT

## 2022-02-02 PROCEDURE — G8427 DOCREV CUR MEDS BY ELIG CLIN: HCPCS | Performed by: PHYSICIAN ASSISTANT

## 2022-02-02 PROCEDURE — G8417 CALC BMI ABV UP PARAM F/U: HCPCS | Performed by: PHYSICIAN ASSISTANT

## 2022-02-02 PROCEDURE — 1123F ACP DISCUSS/DSCN MKR DOCD: CPT | Performed by: PHYSICIAN ASSISTANT

## 2022-02-02 PROCEDURE — 99213 OFFICE O/P EST LOW 20 MIN: CPT | Performed by: PHYSICIAN ASSISTANT

## 2022-02-02 PROCEDURE — 3017F COLORECTAL CA SCREEN DOC REV: CPT | Performed by: PHYSICIAN ASSISTANT

## 2022-02-02 NOTE — PROGRESS NOTES
.  Subjective:      Patient ID: Dora Curry is a 74 y.o. male who is here for follow up evaluation of his left elbow, review MRI results. He is also having some left shoulder pain over the last several days. No history of recent injury. History of prior left shoulder arthroscopy 7/26/2019. Findings at surgery: Small high-grade partial rotator cuff tear. He denies any significant radicular complaints at this time. Review Of Systems:   Negative for fever or chills. Negative for numbness or tingling in the affected extremity.          Past Medical History:   Diagnosis Date    Arthritis     Chronic back pain     Diabetes mellitus ([address]     Fracture of nasal bone     Headache     Hearing loss     High blood pressure     Hyperlipidemia     Wears partial dentures        Family History   Problem Relation Age of Onset    Heart Disease Mother     High Blood Pressure Mother     Diabetes Mother     Heart Disease Father     High Blood Pressure Father     Diabetes Father        Past Surgical History:   Procedure Laterality Date   Catalina Ruddle SURGERY  10/2007   1979  1980    BRONCHOSCOPY      CARPAL TUNNEL RELEASE Bilateral     COLONOSCOPY      ESOPHAGUS SURGERY      HIP ARTHROPLASTY Left 04/04/2017    HIP SURGERY  04/2008    bursa removed    JOINT REPLACEMENT  2009, 2006, 2003, 1997    Left knee replacement    KNEE SURGERY Left     [id number] scopes    SHOULDER ARTHROSCOPY Left 7/26/2019    LEFT SHOULDER ARTHROSCOPY, SUBACROMIAL DECOMPRESSION, ROTATOR CUFF REPAIR performed by Sam Lara MD at [address] History     Occupational History    Not on file   Tobacco Use    Smoking status: Never Smoker    Smokeless tobacco: Never Used   Vaping Use    Vaping Use: Never used   Substance and Sexual Activity    Alcohol use: No    Drug use: No    Sexual activity: Not Currently       Current Outpatient Medications   Medication Sig Dispense Refill    pregabalin (LYRICA) 75 MG capsule       MULTIPLE VITAMIN PO Take by mouth      aspirin 81 MG EC tablet Take 81 mg by mouth      glipiZIDE (GLUCOTROL) 5 MG tablet       ONETOUCH ULTRA strip       nabumetone (RELAFEN) 750 MG tablet  (Patient not taking: Reported on 1/20/2022)      tiZANidine (ZANAFLEX) 4 MG tablet Take 4 mg by mouth 3 times daily      butalbital-APAP-caffeine (FIORICET) -40 MG CAPS per capsule Take 1 capsule by mouth every 6 hours as needed for Headaches 15 capsule 0    oxyCODONE-acetaminophen (PERCOCET) 5-325 MG per tablet Take 1 tablet by mouth every 8 hours as needed for Pain.  amitriptyline (ELAVIL) 75 MG tablet Take 75 mg by mouth nightly as needed       augmented betamethasone dipropionate (DIPROLENE-AF) 0.05 % cream Apply thin layer topically to L ankle every third day 45 g 1    metformin (GLUCOPHAGE) 500 MG tablet Take 500 mg by mouth 2 times daily (with meals) Takes only as needed      metoprolol (TOPROL-XL) 25 MG XL tablet Take 25 mg by mouth daily       zolpidem (AMBIEN) 5 MG tablet Take 5 mg by mouth nightly as needed       baclofen (LIORESAL) 10 MG tablet Take 10 mg by mouth as needed  (Patient not taking: Reported on 1/20/2022)      methadone (DOLOPHINE) 5 MG tablet Take 5 mg by mouth 2 times daily. (Patient not taking: Reported on 1/20/2022)      Multiple Vitamins-Minerals (CENTRUM SILVER PO) Take  by mouth.  lisinopril-hydrochlorothiazide (PRINZIDE;ZESTORETIC) 20-25 MG per tablet Take 1 Tab by mouth daily.  atorvastatin (LIPITOR) 20 MG tablet Take 20 mg by mouth nightly        No current facility-administered medications for this visit. Objective:     He is alert, oriented x 3, pleasant, well nourished, developed and in no   acute distress. Resp 18   Ht 5' 3\" (1.6 m)   Wt 253 lb (114.8 kg)   BMI 44.82 kg/m²      Left Elbow Exam:  There is no obvious deformity. There is no effusion of the joint.    There is full range of motion. There is no instability of the elbow. There is tenderness over the lateral epicondyle. There is minimal swelling at the lateral epicondyle. Pain is with resisted wrist extension and pronation. Examination of the left shoulder: There is no deformity. There is no erythema. There is no  soft tissue swelling. Deltoid region is  tender to palpation. AC Joint is not tender to palpation. Clavicle is not tender to palpation. Bicipital Groove is not  tender to palpation. Pectoralis  is not tender to palpation. Scapula/ trapezius is not tender to palpation. There is no weakness with supraspinatus testing. There is moderate pain with supraspinatus testing. Yergason Test negative. Drop Arm Test negative. Apprehension Test negative. Cross Arm Test negative. Shoulder Active ROM -     IR to L5 ER 60      Upper extremities:  He has 5/5 strength of his interosseous muscles, wrist dorsiflexors and volarflexors, biceps, triceps, deltoids, and internal and external rotators of his shoulders, bilaterally. His biceps, triceps, bracheoradialis, quadriceps and achilles reflexes are 2+, bilaterally. Sensation is intact to light touchfrom C6 to C8. He has no clonus and negative Waggoner's bilaterally. Examination of the upper extremities shows intact perfusion to all extremities. He has no cyanosis and digigts are warm to touch, capillary refill is less than 2 seconds. He has no edema noted. He has intact skin without lacerations or abrasions, no significant erythema, rashes or skin lesions. MRI: Obtained from [facility] or an outside facility.   Narrative   EXAMINATION:   MRI OF THE LEFT ELBOW WITHOUT CONTRAST, 1/25/2022 3:47 pm       TECHNIQUE:   Multiplanar multisequence MRI of the left elbow was performed without the   administration of intravenous contrast.       COMPARISON:   None.       HISTORY:   ORDERING SYSTEM PROVIDED HISTORY: Lateral epicondylitis of left elbow TECHNOLOGIST PROVIDED HISTORY:   Reason for Exam: Patient states no injury, left lateral elbow pain for 6-8   months.  No prior MRI.  No surgery.  Patient coached on motion and scans   repeated, best scans possible, patient in pain.       FINDINGS:   The study is somewhat degraded by the patient's motion.       Distal biceps, brachialis, and triceps tendons are intact.       There is high-grade partial thickness avulsion at the origin of the common   extensor tendons.  The origin of the common flexor tendons is intact.       The medial and lateral ulnar collateral ligament complexes cannot be   adequately evaluated due to motion artifact.       Small to moderate-sized elbow joint effusion is seen.       Bone marrow signal intensity is intact with no acute/subacute fracture or   contusion.       Diffuse thinning of the articular cartilages overlying the capitellum and   radial head is noted.       The ulnar nerve in the cubital tunnel is of normal size and signal.       Muscle signal intensity is intact.           Impression   Degraded study by the patient's motion.       High-grade partial-thickness avulsion at the origin of the common extensor   tendons.       Small to moderate-sized elbow joint effusion.             X Rays: not performed in the office today:     Diagnosis:       ICD-10-CM    1. Lateral epicondylitis of left elbow  M77.12 Ambulatory referral to Physical Therapy   2. Rotator cuff tendonitis, left  M75.82 Ambulatory referral to Physical Therapy        Assessment and Plan:       Assessment:  Lateral epicondylitis due to high-grade partial-thickness avulsion at the origin of the, extensor tendons. Left shoulder pain probable rotator cuff tendinitis. I had an extensive discussion with Mr. Gaurang Eastman regarding the natural history, etiology, and long term consequences of his condition. I have presented reasonable alternatives to the patient's proposed care, treatment, and services.   Risks and benefits of the treatment options also reviewed in detail. I have outlined a treatment plan with them. He has had full opportunity to ask his questions. I have answered them all to his satisfaction. I feel that Mr. Ofelia Carey understands our discussion today. Discussed possible consultation with hand surgery regarding elbow pain. Discussed PRP injections for persistent lateral epicondylar pain. Discussed physical therapy for which she has not tried. Plan:  Medications-   OTC NSAIDS discussed. He  was advised that NSAID-type medications have several important potential side effects: gastrointestinal irritation including hemorrhage, cardiac events and renal injuries. He was asked to take the medication with food and to stop if he experiences any GI upset. I asked him to call for vomiting, abdominal pain or black/bloody stools. He should have renal function testing per his medical provider periodically. He expresses understanding of these risks associated with NSAID use and questions were answered. PT-PT Rx provided today for left elbow and left shoulder. Follow up- 6 weeks. Call or return to clinic if these symptoms worsen or fail to improve as anticipated. Mason Lee PA-C   Senior Physician Assistant   [facility]                                         Disclaimer: This note was generated with use of a verbal recognition program (DRAGON) and an attempt was made to check for errors. It is possible that there are still dictated errors within this office note. If so, please bring any significant errors to my attention for an addendum. All efforts were made to ensure that this office note is accurate.

## 2022-02-07 ENCOUNTER — TELEPHONE (OUTPATIENT)
Dept: ORTHOPEDIC SURGERY | Age: 67
End: 2022-02-07

## 2022-02-07 DIAGNOSIS — M79.89 LEFT ARM SWELLING: Primary | ICD-10-CM

## 2022-02-07 NOTE — TELEPHONE ENCOUNTER
General Question     Subject: PATIENT STATES L HAND AND ARM IS SWOLLEN. STATES HE'S IN PAIN PLEASE ADVISE.    Patient and /or Facility Request: Henry Alvarez  Contact Number: [phone]

## 2022-02-08 ENCOUNTER — TELEPHONE (OUTPATIENT)
Dept: ORTHOPEDIC SURGERY | Age: 67
End: 2022-02-08

## 2022-02-08 ENCOUNTER — HOSPITAL ENCOUNTER (OUTPATIENT)
Dept: VASCULAR LAB | Age: 67
Discharge: HOME | End: 2022-02-08
Payer: COMMERCIAL

## 2022-02-08 DIAGNOSIS — M79.89 LEFT ARM SWELLING: ICD-10-CM

## 2022-02-08 PROCEDURE — 93971 EXTREMITY STUDY: CPT

## 2022-02-08 NOTE — TELEPHONE ENCOUNTER
Pt scheduled for venous doppler at [facility] for today at 9:30am, STAT.    Pt informed

## 2022-02-08 NOTE — TELEPHONE ENCOUNTER
Per Isela Hollingsworth he needs venous doppler. Called and informed patient that he will be called today to schedule.

## 2022-02-25 NOTE — FLOWSHEET NOTE
[address] [facility] [address]  Phone: [phone]   Fax:     [phone]        Physical Therapy Treatment Note/ Progress Report:       Date:  2022    Patient Name:  Liza Olmos    :  1955  MRN: [id number]    Pertinent Medical History:Additional Pertinent Hx: arthritis, dm2, htn, hpl, chronic back pain, Multiple ortho surgeries, hip, knee, shoulder    Medical/Treatment Diagnosis Information:  ·   M77.12 (ICD-10-CM) - Lateral epicondylitis, left elbow  · Treatment Diagnosis: decreased abilty to function and use ue    Insurance/Certification information:    medicare  Physician Information:   Vonnie Salter  Plan of care signed (Y/N):     Date of Patient follow up with Physician:      Progress Report: []  Yes  [x]  No     Date Range for reporting period:  Beginnin2022  Ending:      Progress report due (10 Rx/or 30 days whichever is less):      Recertification due (POC duration/ or 90 days whichever is less):      Visit # POC/Insurance Allowable Auth Needed    12 []Yes    []No     Functional Outcomes Measure:   Date Assessed:  Qd- 37    Pain level:  6/10     History of Injury:  Patient is a 75 y/o male with a hx of left elbow pain for the past few months . His mri showed an extensor expansion tear. He c/o constant pain in his left elbow with edema which goes to his shoulder and hand at times. He denies numbness. He can't use his arm like he needs. He wakes on occasion. He has had a shoulder surgery. He hopes to have less pain.       SUBJECTIVE:  Pt states, \" I'm having a ;lot of pain in my shoulder, elbow, and wrist \"    OBJECTIVE:    See eval       RESTRICTIONS/PRECAUTIONS:     Exercises/Interventions:   Therapeutic Ex (00606)  Min: Resistance/Reps Cues/Notes   Nu step     SCAP RETRACT     bilat ext     W     Biceps curls, triceps curls     Sup/pron Manual Intervention  (80488)  Min:               NMR re-education (87679)  Min:               Therapeutic Activity (45477)  Min:               Modalities:  Min                 Other Therapeutic Activities:  Pt was educated on PT POC, Diagnosis, Prognosis, pathomechanics as well as frequency and duration of scheduling future physical therapy appointments. Time was also taken on this day to answer all patient questions and participation in PT. Reviewed appointment policy in detail with patient and patient verbalized understanding. Home Exercise Program: Patient demonstrated proper technique, good tolerance,  and was given written instructions for the above exercises  Access Code: [email or web address]  URL: [email or web address]  Date: 03/01/2022  Prepared by: Diane Thapa    Exercises  Standing shoulder flexion wall slides - 1 x daily - 7 x weekly - 3 sets - 10 reps  Seated Scapular Retraction - 1 x daily - 7 x weekly - 3 sets - 10 reps  Doorway Pec Stretch at 60 Degrees Abduction with Arm Straight - 1 x daily - 7 x weekly - 3 sets - 10 reps      Therapeutic Exercise and NMR EXR  [] (11786) Provided verbal/tactile cueing for activities related to strengthening, flexibility, endurance, ROM  for improvements in scapular, scapulothoracic and UE control with self care, reaching, carrying, lifting, house/yardwork, driving/computer work.    [] (22743) Provided verbal/tactile cueing for activities related to improving balance, coordination, kinesthetic sense, posture, motor skill, proprioception  to assist with  scapular, scapulothoracic and UE control with self care, reaching, carrying, lifting, house/yardwork, driving/computer work.     Therapeutic Activities:    [] (69081 or 75571) Provided verbal/tactile cueing for activities related to improving balance, coordination, kinesthetic sense, posture, motor skill, proprioception and motor activation to allow for proper function of scapular, scapulothoracic and UE control with self care, carrying, lifting, driving/computer work. Home Exercise Program:    [x] (75721) Reviewed/Progressed HEP activities related to strengthening, flexibility, endurance, ROM of scapular, scapulothoracic and UE control with self care, reaching, carrying, lifting, house/yardwork, driving/computer work  [] (93665) Reviewed/Progressed HEP activities related to improving balance, coordination, kinesthetic sense, posture, motor skill, proprioception of scapular, scapulothoracic and UE control with self care, reaching, carrying, lifting, house/yardwork, driving/computer work      Manual Treatments:  PROM / STM / Oscillations-Mobs:  G-I, II, III, IV (PA's, Inf., Post.)  [] (12067) Provided manual therapy to mobilize soft tissue/joints of cervical/CT, scapular GHJ and UE for the purpose of modulating pain, promoting relaxation,  increasing ROM, reducing/eliminating soft tissue swelling/inflammation/restriction, improving soft tissue extensibility and allowing for proper ROM for normal function with self care, reaching, carrying, lifting, house/yardwork, driving/computer work    Charges:  Timed Code Treatment Minutes: 30   Total Treatment Minutes: 50       [x] EVAL (LOW) 71523 (typically 20 minutes face-to-face)  [] EVAL (MOD) 84137 (typically 30 minutes face-to-face)  [] EVAL (HIGH) 94211 (typically 45 minutes face-to-face)  [] RE-EVAL     [x] VX(98706) x 1    [] Dry needle 1 or 2 Muscles (82700)  [] NMR (36669) x     [] Dry needle 3+ Muscles (83399)  [x] Manual (03991) x  1   [] Ultrasound (71866) x  [] TA (62874) x     [] Mech Traction (92530)  [] ES(attended) (98008)     [] ES (un) (58543):   [] Vasopump (93267) [] Ionto (52755)   [] Other:      GOALS:  Patient stated goal: \" I want to have less pain and be able to use my arm \"  []? Progressing: []? Met: []? Not Met: []? Adjusted     Therapist goals for Patient:   Short Term Goals: To be achieved in: 2 weeks  1.  Independent in HEP and progression per patient tolerance, in order to prevent re-injury. []? Progressing: []? Met: []? Not Met: []? Adjusted  2. Patient will have a decrease in pain to facilitate improvement in movement, function, and ADLs as indicated by Functional Deficits. []? Progressing: []? Met: []? Not Met: []? Adjusted     Long Term Goals: To be achieved in: 6 weeks  1. Disability index score of 40%% or less for the Quick DASH to assist with reaching prior level of function. []? Progressing: []? Met: []? Not Met: []? Adjusted  2. Patient will demonstrate increased AROM to wfl to allow for proper joint functioning as indicated by Functional Deficits. []? Progressing: []? Met: []? Not Met: []? Adjusted  3. Patient will demonstrate an increase in NM recruitment/activation and overall GH and scapular strength to within n5lbs HHD or WNL for proper functional mobility as indicated by patients Functional Deficits. []? Progressing: []? Met: []? Not Met: []? Adjusted  4. Patient will return to 60%  activities without increased symptoms or restriction. []? Progressing: []? Met: []? Not Met: []? Adjusted  5. (patient specific functional goal)       []? Progressing: []? Met: []? Not Met: []? Adjusted    ASSESSMENT:  See eval    Treatment/Activity Tolerance:  [x] Patient tolerated treatment well [] Patient limited by fatique  [] Patient limited by pain  [] Patient limited by other medical complications  [] Other:     Overall Progression Towards Functional goals/ Treatment Progress Update:  [] Patient is progressing as expected towards functional goals listed. [] Progression is slowed due to complexities/Impairments listed. [] Progression has been slowed due to co-morbidities.   [x] Plan just implemented, too soon to assess goals progression <30days   [] Goals require adjustment due to lack of progress  [] Patient is not progressing as expected and requires additional follow up with physician  [] Other    Prognosis for POC: [x] Good [] Fair  [] Poor    Patient requires continued skilled intervention: [x] Yes  [] No      PLAN: UE arom, aarom, prom, strengthening, scapular strength, myofascial release, joint mobs to gh, sc, ac, scapular thoracic, modalities for pain, hep, poor posture, very tight shoulder jnts, pecs, increased thoracic kyphosis    [] Continue per plan of care [] Alter current plan (see comments)  [x] Plan of care initiated [] Hold pending MD visit [] Discharge    Electronically signed by: Nieves Tyson PT     Note: If patient does not return for scheduled/recommended follow up visits, this note will serve as a discharge from care along with the most recent update on progress.

## 2022-02-25 NOTE — PLAN OF CARE
[facility] [address]  Phone: [phone]   Fax:     [phone]                                                       Physical Therapy Certification    Dear  ,    We had the pleasure of evaluating the following patient for physical therapy services at [facility]. A summary of our findings can be found in the initial assessment below. This includes our plan of care. If you have any questions or concerns regarding these findings, please do not hesitate to contact me at the office phone number checked above. Thank you for the referral.       Physician Signature:_______________________________Date:__________________  By signing above (or electronic signature), therapists plan is approved by physician              Patient: Mejia Bass   : 1955   MRN: [id number]  Referring Physician:        Evaluation Date: 2022      Medical Diagnosis Information:    M77.12 (ICD-10-CM) - Lateral epicondylitis, left elbow   Treatment Diagnosis: decreased abilty to function and use ue                                         Insurance information:  medicare    Precautions/ Contra-indications:   Latex Allergy:   [x]  NO      []YES  Preferred Language for Healthcare:   [x]English       []other:    C-SSRS Triggered by Intake questionnaire (Past 2 wk assessment ):   [x] No, Questionnaire did not trigger screening.   [] Yes, Patient intake triggered C-SSRS Screening      [] C-SSRS Screening completed  [] PCP notified via Epic     SUBJECTIVE: Patient is a 75 y/o male with a hx of left elbow pain for the past few months . His mri showed an extensor expansion tear. He c/o constant pain in his left elbow with edema which goes to his shoulder and hand at times. He denies numbness. He can't use his arm like he needs. He wakes on occasion.  He has had a shoulder surgery. He hopes to have less pain. Relevant Medical History:Additional Pertinent Hx: arthritis, dm2, htn, hpl, chronic back pain, Multiple ortho surgeries, hip, knee, shoulder  Functional Outcomes Measure: qd-37    Pain Scale: 6/10  Easing factors: rest  Provocative factors: use     Type: [x]Constant   []Intermittent  []Radiating []Localized []other:     Numbness/Tingling:     Occupation/School:retired     Living Status/Prior Level of Function: Independent with ADLs and IADLs,     OBJECTIVE:   Posture:for head, rounded shoulders, increased thoracic kyph        Palpation: very tight and tender in wrist and elbow flex and extensors as well as supinators and pronators, all shoulder rtc muscles, decreased gh motion, scap          CERV ROM     Cervical Flexion 25% decrease globally, no pain in ue    Cervical Extension     Cervical SB     Cervical rotation          ROM Left Right   Shoulder Flex 90 wfl   Shoulder Abd 70    Shoulder ER 40    Shoulder IR 40    Wrist flex 30    ext 40    Rd/ud 0-20    sup -10    pron -10              Strength  Left Right   Shoulder Flex 4- 5/5   Shoulder Scap 4-    Shoulder ER 4-    Shoulder IR 4-     40#    elbow 4-    wrist 4-                      Reflexes Normal Abnormal Comments   [x]ALL NORMAL            S1-2 Seated achilles [] []    S1-2 Prone knee bend [] []    L3-4 Patellar tendon [] []    C5-6 Biceps [x] []    C6 Brachioradialis [x] []    C7-8 Triceps [x] []    Clonus [] []    Babinski [] []    Waggoner's [] []      Reflexes/Sensation:    [x]Dermatomes/Myotomes intact    [x]Reflexes equal and normal bilaterally   []Other:    Joint mobility: shoulder and wrist   []Normal    [x]Hypo   []Hyper    Orthopedic Special Tests: -Chavez- painful, Drop arm - painful,  Speeds, Hornblower and , Belly press neg,  Tinels- ng, Spurlings- neg. [x] Patient history, allergies, meds reviewed. Medical chart reviewed. See intake form.      Review Of Systems (ROS):  [x]Performed Review of systems (Integumentary, CardioPulmonary, Neurological) by intake and observation. Intake form has been scanned into medical record. Patient has been instructed to contact their primary care physician regarding ROS issues if not already being addressed at this time. Co-morbidities/Complexities (which will affect course of rehabilitation):  []None           Arthritic conditions   []Rheumatoid arthritis (M05.9)  [x]Osteoarthritis (M19.91)   Cardiovascular conditions   [x]Hypertension (I10)  [x]Hyperlipidemia (E78.5)  []Angina pectoris (I20)  []Atherosclerosis (I70)  []CVA Musculoskeletal conditions   []Disc pathology   [x]Congenital spine pathologies   []Prior surgical intervention  []Osteoporosis (M81.8)  []Osteopenia (M85.8)   Endocrine conditions   []Hypothyroid (E03.9)  []Hyperthyroid Gastrointestinal conditions   []Constipation (N06.12)   Metabolic conditions   []Morbid obesity (E66.01)  [x]Diabetes type 1(E10.65) or 2 (E11.65)   []Neuropathy (G60.9)     Pulmonary conditions   []Asthma (J45)  []Coughing   []COPD (J44.9)   Psychological Disorders  []Anxiety (F41.9)  []Depression (F32.9)   []Other:   [x]Other: hip, knee shoulder surgery, chronic back pain         Barriers to/and or personal factors that will affect rehab potential:              [x]Age  []Sex   []Smoker              []Motivation/Lack of Motivation                        [x]Co-Morbidities              []Cognitive Function, education/learning barriers              []Environmental, home barriers              []profession/work barriers  [x]past PT/medical experience  []other:  Justification:     Falls Risk Assessment (30 days):   [x] Falls Risk assessed and no intervention required.   [] Falls Risk assessed and Patient requires intervention due to being higher risk   TUG score (>12s at risk):     [] Falls education provided, including         ASSESSMENT:    Functional Impairments:     [x]Noted spinal or UE joint hypomobility   []Noted spinal or UE joint hypermobility   []Decreased spinal/UE functional ROM   []Abnormal reflexes/sensation/myotomal/dermatomal deficits   [x]Decreased RC/scapular/core strength and neuromuscular control    [x]Decreased UE functional strength   []other:      Functional Activity Limitations (from functional questionnaire and intake)   [x]Reduced ability to tolerate prolonged functional positions   [x]Reduced ability or difficulty with changes of positions or transfers between positions   [x]Reduced ability to maintain good posture and demonstrate good body mechanics with sitting, bending, and lifting   [x] Reduced ability or tolerance with driving and/or computer work   [x]Reduced ability to perform lifting, reaching, carrying tasks   [x]Reduced ability to reach behind back   [x]Reduced ability to sleep    [x]Reduced ability to tolerate any impact through UE or spine   [x]Reduced ability to  or hold objects   [x]Reduced ability to throw or toss an object   []other:    Participation Restrictions   []Reduced participation in self care activities   []Reduced participation in home management activities   []Reduced participation in work activities   []Reduced participation in social activities. []Reduced participation in sport/recreational activities. Classification/Subgrouping:   []signs/symptoms consistent with post-surgical status including decreased ROM, strength and function.     []signs/symptoms consistent with joint sprain/strain    [x]signs/symptoms consistent with shoulder impingement (internal, external, primary or secondary)   [x]signs/symptoms consistent with shoulder/elbow/wrist tendinopathy   []Signs/symptoms consistent with Rotator cuff tear   []sign/symptoms consistent with labral tear   []signs/symptoms consistent with rib dysfunction   []signs/symptoms consistent with postural dysfunction   []signs/symptoms consistent with Glenohumeral IR Deficit - <45 degrees   []signs/symptoms consistent with facet dysfunction of cervical/thoracic spine   [x]signs/symptoms consistent with pathology which may benefit from Dry Needling   []signs/symptoms which may limit the use of advanced manual therapy techniques: (Elevated CV risk profile, recent trauma, intolerance to end range positions, prior TIA, visual issues, UE neurological compromise )    +++Tear of common extensor tendon    Prognosis/Rehab Potential:      []Excellent   []Good    [x]Fair   []Poor    Tolerance of evaluation/treatment:    []Excellent   []Good    [x]Fair   []Poor    Physical Therapy Evaluation Complexity Justification  [x] A history of present problem with:  [] no personal factors and/or comorbidities that impact the plan of care;  []1-2 personal factors and/or comorbidities that impact the plan of care  [x]3 personal factors and/or comorbidities that impact the plan of care  [x] An examination of body systems using standardized tests and measures addressing any of the following: body structures and functions (impairments), activity limitations, and/or participation restrictions;:  [] a total of 1-2 or more elements   [] a total of 3 or more elements   [x] a total of 4 or more elements   [x] A clinical presentation with:  [] stable and/or uncomplicated characteristics   [x] evolving clinical presentation with changing characteristics  [] unstable and unpredictable characteristics;   [x] Clinical decision making of [] low, [] moderate, [] high complexity using standardized patient assessment instrument and/or measurable assessment of functional outcome.     [x] EVAL (LOW) 00053 (typically 20 minutes face-to-face)  [] EVAL (MOD) 13006 (typically 30 minutes face-to-face)  [] EVAL (HIGH) 58833 (typically 45 minutes face-to-face)  [] RE-EVAL     PLAN:   Frequency/Duration:  2 days per week for 6 Weeks:  Interventions:  [x]  Therapeutic exercise including: strength training, ROM, for scapula, core and Upper extremity, including postural re-education. [x]  NMR activation and proprioception for UE, periscapular and RC muscles and Core, including postural re-education. [x]  Manual therapy as indicated for shoulder, scapula, spine and associated soft tissue including: Dry Needling/IASTM, STM, PROM, Gr I-IV mobilizations, manipulation. [x] Modalities as needed that may include: thermal agents, E-stim, Biofeedback, US, iontophoresis as indicated  [x] Patient education on joint protection, postural re-education, activity modification, progression of HEP. HEP instruction: (see scanned forms)    GOALS:  Patient stated goal: \" I want to have less pain and be able to use my arm \"  [] Progressing: [] Met: [] Not Met: [] Adjusted    Therapist goals for Patient:   Short Term Goals: To be achieved in: 2 weeks  1. Independent in HEP and progression per patient tolerance, in order to prevent re-injury. [] Progressing: [] Met: [] Not Met: [] Adjusted  2. Patient will have a decrease in pain to facilitate improvement in movement, function, and ADLs as indicated by Functional Deficits. [] Progressing: [] Met: [] Not Met: [] Adjusted    Long Term Goals: To be achieved in: 6 weeks  1. Disability index score of 40%% or less for the Quick DASH to assist with reaching prior level of function. [] Progressing: [] Met: [] Not Met: [] Adjusted  2. Patient will demonstrate increased AROM to wfl to allow for proper joint functioning as indicated by Functional Deficits. [] Progressing: [] Met: [] Not Met: [] Adjusted  3. Patient will demonstrate an increase in NM recruitment/activation and overall GH and scapular strength to within n5lbs HHD or WNL for proper functional mobility as indicated by patients Functional Deficits. [] Progressing: [] Met: [] Not Met: [] Adjusted  4. Patient will return to 60%  activities without increased symptoms or restriction.    [] Progressing: [] Met: [] Not Met: [] Adjusted  5. (patient specific functional goal)     [] Progressing: [] Met: [] Not Met: [] Adjusted    Electronically signed by:  Santy Corcoran PT      Note: If patient does not return for scheduled/recommended follow up visits, this note will serve as a discharge from care along with the most recent update on progress.

## 2022-03-01 ENCOUNTER — HOSPITAL ENCOUNTER (OUTPATIENT)
Dept: PHYSICAL THERAPY | Age: 67
Setting detail: THERAPIES SERIES
Discharge: HOME | End: 2022-03-01
Payer: COMMERCIAL

## 2022-03-01 PROCEDURE — 97161 PT EVAL LOW COMPLEX 20 MIN: CPT

## 2022-03-01 PROCEDURE — 97110 THERAPEUTIC EXERCISES: CPT

## 2022-03-01 PROCEDURE — 97140 MANUAL THERAPY 1/> REGIONS: CPT

## 2022-03-07 ENCOUNTER — HOSPITAL ENCOUNTER (OUTPATIENT)
Dept: PHYSICAL THERAPY | Age: 67
Setting detail: THERAPIES SERIES
Discharge: HOME | End: 2022-03-07
Payer: COMMERCIAL

## 2022-03-07 NOTE — FLOWSHEET NOTE
[address]  Phone: [phone]   Fax:     [phone]    Physical Therapy  Cancellation/No-show Note  Patient Name:  Carmel Randhawa  :  1955   Date:  3/7/2022  Cancelled visits to date: 1  No-shows to date: 0    Patient status for today's appointment patient:  [x]  Cancelled  []  Rescheduled appointment  []  No-show     Reason given by patient:  []  Patient ill  []  Conflicting appointment  []  No transportation    []  Conflict with work  []  No reason given  [x]  Other:     Comments:  Emergency come up    Phone call information:   []  Phone call made today to patient at _ time at number provided:      []  Patient answered, conversation as follows:    []  Patient did not answer, message left as follows:  []  Phone call not made today    Electronically signed by:  Ethan Thomas, PTA

## 2022-03-10 ENCOUNTER — HOSPITAL ENCOUNTER (OUTPATIENT)
Dept: PHYSICAL THERAPY | Age: 67
Setting detail: THERAPIES SERIES
Discharge: HOME | End: 2022-03-10
Payer: COMMERCIAL

## 2022-03-10 ENCOUNTER — OFFICE VISIT (OUTPATIENT)
Dept: ORTHOPEDIC SURGERY | Age: 67
End: 2022-03-10
Payer: COMMERCIAL

## 2022-03-10 DIAGNOSIS — M70.62 TROCHANTERIC BURSITIS OF LEFT HIP: Primary | ICD-10-CM

## 2022-03-10 PROCEDURE — 97110 THERAPEUTIC EXERCISES: CPT

## 2022-03-10 PROCEDURE — 1036F TOBACCO NON-USER: CPT | Performed by: PHYSICIAN ASSISTANT

## 2022-03-10 PROCEDURE — 20610 DRAIN/INJ JOINT/BURSA W/O US: CPT | Performed by: PHYSICIAN ASSISTANT

## 2022-03-10 PROCEDURE — G8417 CALC BMI ABV UP PARAM F/U: HCPCS | Performed by: PHYSICIAN ASSISTANT

## 2022-03-10 PROCEDURE — 99213 OFFICE O/P EST LOW 20 MIN: CPT | Performed by: PHYSICIAN ASSISTANT

## 2022-03-10 PROCEDURE — 4040F PNEUMOC VAC/ADMIN/RCVD: CPT | Performed by: PHYSICIAN ASSISTANT

## 2022-03-10 PROCEDURE — G8427 DOCREV CUR MEDS BY ELIG CLIN: HCPCS | Performed by: PHYSICIAN ASSISTANT

## 2022-03-10 PROCEDURE — 3017F COLORECTAL CA SCREEN DOC REV: CPT | Performed by: PHYSICIAN ASSISTANT

## 2022-03-10 PROCEDURE — G8484 FLU IMMUNIZE NO ADMIN: HCPCS | Performed by: PHYSICIAN ASSISTANT

## 2022-03-10 PROCEDURE — 1123F ACP DISCUSS/DSCN MKR DOCD: CPT | Performed by: PHYSICIAN ASSISTANT

## 2022-03-10 PROCEDURE — 97140 MANUAL THERAPY 1/> REGIONS: CPT

## 2022-03-10 RX ORDER — TRIAMCINOLONE ACETONIDE 40 MG/ML
40 INJECTION, SUSPENSION INTRA-ARTICULAR; INTRAMUSCULAR ONCE
Status: COMPLETED | OUTPATIENT
Start: 2022-03-10 | End: 2022-03-10

## 2022-03-10 RX ORDER — BUPIVACAINE HYDROCHLORIDE 2.5 MG/ML
2 INJECTION, SOLUTION INFILTRATION; PERINEURAL ONCE
Status: COMPLETED | OUTPATIENT
Start: 2022-03-10 | End: 2022-03-10

## 2022-03-10 RX ADMIN — BUPIVACAINE HYDROCHLORIDE 5 MG: 2.5 INJECTION, SOLUTION INFILTRATION; PERINEURAL at 14:04

## 2022-03-10 RX ADMIN — TRIAMCINOLONE ACETONIDE 40 MG: 40 INJECTION, SUSPENSION INTRA-ARTICULAR; INTRAMUSCULAR at 14:04

## 2022-03-10 NOTE — PROGRESS NOTES
Subjective:      Patient ID: Heron Kelly is a 74 y.o.  male. He is here today for left lateral hip pain. He is also here today for follow-up of his lateral epicondylitis. Currently in physical therapy. PT also addressing tightness in the left shoulder which appears to be helping. History of left hip arthroplasty. Last trochanteric steroid injection 3/19/2020 with marked improvement. Pain is on average 3-4/10. Pain is worse with laying on the hip or getting up from a seated position. Pain improves with change in position. There is no new injury. Review of Systems:   Negative for fever or chills. Negative for numbness or tingling in the affected extremity.         Past Medical History:   Diagnosis Date    Arthritis     Chronic back pain     Diabetes mellitus ([address]     Fracture of nasal bone     Headache     Hearing loss     High blood pressure     Hyperlipidemia     Wears partial dentures        Family History   Problem Relation Age of Onset    Heart Disease Mother     High Blood Pressure Mother     Diabetes Mother     Heart Disease Father     High Blood Pressure Father     Diabetes Father        Past Surgical History:   Procedure Laterality Date   Theora Corrente SURGERY  10/2007   1979  1980    BRONCHOSCOPY      CARPAL TUNNEL RELEASE Bilateral     COLONOSCOPY      ESOPHAGUS SURGERY      HIP ARTHROPLASTY Left 04/04/2017    HIP SURGERY  04/2008    bursa removed    JOINT REPLACEMENT  2009, 2006, 2003, 1997    Left knee replacement    KNEE SURGERY Left     [id number] scopes    SHOULDER ARTHROSCOPY Left 7/26/2019    LEFT SHOULDER ARTHROSCOPY, SUBACROMIAL DECOMPRESSION, ROTATOR CUFF REPAIR performed by Fela Coley MD at [address] History     Occupational History    Not on file   Tobacco Use    Smoking status: Never Smoker    Smokeless tobacco: Never Used   Vaping Use    Vaping Use: Never used   Substance and Sexual Activity    Alcohol use: No    Drug use: No    Sexual activity: Not Currently       Current Outpatient Medications   Medication Sig Dispense Refill    pregabalin (LYRICA) 75 MG capsule       MULTIPLE VITAMIN PO Take by mouth      aspirin 81 MG EC tablet Take 81 mg by mouth      glipiZIDE (GLUCOTROL) 5 MG tablet       ONETOUCH ULTRA strip       nabumetone (RELAFEN) 750 MG tablet  (Patient not taking: Reported on 1/20/2022)      tiZANidine (ZANAFLEX) 4 MG tablet Take 4 mg by mouth 3 times daily      butalbital-APAP-caffeine (FIORICET) -40 MG CAPS per capsule Take 1 capsule by mouth every 6 hours as needed for Headaches 15 capsule 0    oxyCODONE-acetaminophen (PERCOCET) 5-325 MG per tablet Take 1 tablet by mouth every 8 hours as needed for Pain.  amitriptyline (ELAVIL) 75 MG tablet Take 75 mg by mouth nightly as needed       augmented betamethasone dipropionate (DIPROLENE-AF) 0.05 % cream Apply thin layer topically to L ankle every third day 45 g 1    metformin (GLUCOPHAGE) 500 MG tablet Take 500 mg by mouth 2 times daily (with meals) Takes only as needed      metoprolol (TOPROL-XL) 25 MG XL tablet Take 25 mg by mouth daily       zolpidem (AMBIEN) 5 MG tablet Take 5 mg by mouth nightly as needed       baclofen (LIORESAL) 10 MG tablet Take 10 mg by mouth as needed  (Patient not taking: Reported on 1/20/2022)      methadone (DOLOPHINE) 5 MG tablet Take 5 mg by mouth 2 times daily. (Patient not taking: Reported on 1/20/2022)      Multiple Vitamins-Minerals (CENTRUM SILVER PO) Take  by mouth.  lisinopril-hydrochlorothiazide (PRINZIDE;ZESTORETIC) 20-25 MG per tablet Take 1 Tab by mouth daily.  atorvastatin (LIPITOR) 20 MG tablet Take 20 mg by mouth nightly        No current facility-administered medications for this visit. Objective:   He is alert, oriented x 3, pleasant, well nourished, developed and in no acute distress. There were no vitals taken for this visit.      HIP EXAM:  Examination of the left hip shows:  No discoloration, wounds or gross deformity. Palpation of the greater trochanter/bursa-tender to palpation. Palpation of the anterior superior iliac spine-nontender. Palpation of the ischial tuberosity-nontender. Palpation over the sacroiliac joint-nontender. ROM:  -Flexion 125                      (Nml 120-135 degrees)  -Extension 20                  (Nml 20-30 degrees)  -Abduction 35                  (Nml 40-50 degrees)  -Internal rotation 25         (Nml 30 degrees)  -External rotation 45        (Nml 50 degrees)  Mild antalgic limp. Examination of the lower extremities are intact with sensation to light touch. Motor testing  5/5 in all major motor groups of the lower extremities. Negative Waggoner's Sign. SLR negative. Examination of the lower extremities shows intact perfusion to all extremities. No cyanosis. Digits are warm to touch, capillary refill is less than 2 seconds. There is no edema noted. Examination of the skin over both lower extremities reveals: The skin to be intact without lacerations or abrasions. No significant erythema. No rashes or skin lesions. X Rays: not performed in the office today:       Diagnosis:       ICD-10-CM    1. Trochanteric bursitis of left hip  M70.62 IN ARTHROCENTESIS ASPIR&/INJ MAJOR JT/BURSA W/O US     triamcinolone acetonide (KENALOG-40) injection 40 mg     bupivacaine (MARCAINE) 0.25 % injection 5 mg        Assessment/ Plan       Assessment:  History of left hip arthroplasty, left lateral hip pain felt to be related to trochanteric bursitis. Last injection 3/19/2020 provided significant lateral hip pain relief. His left lateral hip symptoms has gradually returned without history of injury. Plan:  Medications- Tylenol    PT- A home exercise program was instructed today including ROM exercises and strengthening exercises.  The patient verbalized understanding of these exercises as well as the importance of the exercise program to promote return of normal function. If pain intensifies or other problems arise you are to notify the office. Procedures- Cortisone injection  PROCEDURE NOTE:  Pre op Diagnosis:  Trochanteric Bursitis  Post op Diagnosis: Same  With Gayle King permission, the left hip was prepped  in standard sterile fashion with  Alcohol and 2 cc of 0.25% Marcaine and 1 cc of Kenalog 40 mg was injected into the left lateral trochanteric region without difficulty. He tolerated this well without difficulty. A band-aid was applied. He was advised to ice the hip for 15-20 minutes to relieve any injection site related pain. Follow up:   Call or return to clinic if these symptoms worsen or fail to improve as anticipated.

## 2022-03-10 NOTE — FLOWSHEET NOTE
[address] [facility] [address]  Phone: [phone]   Fax:     [phone]        Physical Therapy Treatment Note/ Progress Report:       Date:  3/10/2022    Patient Name:  Gayle King    :  1955  MRN: [id number]    Pertinent Medical History:Additional Pertinent Hx: arthritis, dm2, htn, hpl, chronic back pain, Multiple ortho surgeries, hip, knee, shoulder    Medical/Treatment Diagnosis Information:  ·   M77.12 (ICD-10-CM) - Lateral epicondylitis, left elbow  · Treatment Diagnosis: decreased abilty to function and use ue    Insurance/Certification information:    medicare  Physician Information:   Becky Jiang  Plan of care signed (Y/N):     Date of Patient follow up with Physician:      Progress Report: []  Yes  [x]  No     Date Range for reporting period:  Beginning:  3/10/2022  Ending:      Progress report due (10 Rx/or 30 days whichever is less):      Recertification due (POC duration/ or 90 days whichever is less):      Visit # POC/Insurance Allowable Auth Needed   2 12 []Yes    []No     Functional Outcomes Measure:   Date Assessed:  Qd- 37    Pain level:  6/10     History of Injury:  Patient is a 75 y/o male with a hx of left elbow pain for the past few months . His mri showed an extensor expansion tear. He c/o constant pain in his left elbow with edema which goes to his shoulder and hand at times. He denies numbness. He can't use his arm like he needs. He wakes on occasion. He has had a shoulder surgery. He hopes to have less pain.       SUBJECTIVE:  Pt states, \" I'm having a ;lot of pain in my shoulder, elbow, and wrist \"  3/10/22  Pt states, \" elbow pain off and on, same with shoulder \"    OBJECTIVE:    See eval       RESTRICTIONS/PRECAUTIONS:     Exercises/Interventions:   Therapeutic Ex (02122)  Min:15 Resistance/Reps Cues/Notes   Nu step     SCAP RETRACT X 30    bilat ext Green x 30    W Yellow x 30    Biceps curls, triceps curls Triceps blue x 30    Sup/pron     Add/ext Black x 30    pecs stretch 60 x 2                        Manual Intervention  (19423)  Min:30 Mfr to pecs,rtc, wrist extensors, wrist flex, prom all shoulder and elbow motions              NMR re-education (89335)  Min:               Therapeutic Activity (22207)  Min:               Modalities:  Min                 Other Therapeutic Activities:  Pt was educated on PT POC, Diagnosis, Prognosis, pathomechanics as well as frequency and duration of scheduling future physical therapy appointments. Time was also taken on this day to answer all patient questions and participation in PT. Reviewed appointment policy in detail with patient and patient verbalized understanding. Home Exercise Program: Patient demonstrated proper technique, good tolerance,  and was given written instructions for the above exercises  Access Code: [email or web address]  URL: [email or web address]  Date: 03/01/2022  Prepared by: Parris Morataya    Exercises  Standing shoulder flexion wall slides - 1 x daily - 7 x weekly - 3 sets - 10 reps  Seated Scapular Retraction - 1 x daily - 7 x weekly - 3 sets - 10 reps  Doorway Pec Stretch at 60 Degrees Abduction with Arm Straight - 1 x daily - 7 x weekly - 3 sets - 10 reps      Therapeutic Exercise and NMR EXR  [] (82034) Provided verbal/tactile cueing for activities related to strengthening, flexibility, endurance, ROM  for improvements in scapular, scapulothoracic and UE control with self care, reaching, carrying, lifting, house/yardwork, driving/computer work.    [] (98043) Provided verbal/tactile cueing for activities related to improving balance, coordination, kinesthetic sense, posture, motor skill, proprioception  to assist with  scapular, scapulothoracic and UE control with self care, reaching, carrying, lifting, house/yardwork, driving/computer work.     Therapeutic Activities:    [] (11393 or 57072) Provided verbal/tactile cueing for activities related to improving balance, coordination, kinesthetic sense, posture, motor skill, proprioception and motor activation to allow for proper function of scapular, scapulothoracic and UE control with self care, carrying, lifting, driving/computer work.      Home Exercise Program:    [x] (54757) Reviewed/Progressed HEP activities related to strengthening, flexibility, endurance, ROM of scapular, scapulothoracic and UE control with self care, reaching, carrying, lifting, house/yardwork, driving/computer work  [] (07741) Reviewed/Progressed HEP activities related to improving balance, coordination, kinesthetic sense, posture, motor skill, proprioception of scapular, scapulothoracic and UE control with self care, reaching, carrying, lifting, house/yardwork, driving/computer work      Manual Treatments:  PROM / STM / Oscillations-Mobs:  G-I, II, III, IV (PA's, Inf., Post.)  [] (08173) Provided manual therapy to mobilize soft tissue/joints of cervical/CT, scapular GHJ and UE for the purpose of modulating pain, promoting relaxation,  increasing ROM, reducing/eliminating soft tissue swelling/inflammation/restriction, improving soft tissue extensibility and allowing for proper ROM for normal function with self care, reaching, carrying, lifting, house/yardwork, driving/computer work    Charges:  Timed Code Treatment Minutes: 45   Total Treatment Minutes: 50       [] EVAL (LOW) 33038 (typically 20 minutes face-to-face)  [] EVAL (MOD) 82995 (typically 30 minutes face-to-face)  [] EVAL (HIGH) 57761 (typically 45 minutes face-to-face)  [] RE-EVAL     [x] QW(43656) x 1    [] Dry needle 1 or 2 Muscles (24002)  [] NMR (21225) x     [] Dry needle 3+ Muscles (97512)  [x] Manual (05475) x 2   [] Ultrasound (46646) x  [] TA (81411) x     [] Mech Traction (13742)  [] ES(attended) (48900)     [] ES (un) (14043):   [] Vasopump (51755) [] Ionto (19909)   [] Other:      GOALS:  Patient stated goal: \" I want to have less pain and be able to use my arm \"  []? Progressing: []? Met: []? Not Met: []? Adjusted     Therapist goals for Patient:   Short Term Goals: To be achieved in: 2 weeks  1. Independent in HEP and progression per patient tolerance, in order to prevent re-injury. []? Progressing: []? Met: []? Not Met: []? Adjusted  2. Patient will have a decrease in pain to facilitate improvement in movement, function, and ADLs as indicated by Functional Deficits. []? Progressing: []? Met: []? Not Met: []? Adjusted     Long Term Goals: To be achieved in: 6 weeks  1. Disability index score of 40%% or less for the Quick DASH to assist with reaching prior level of function. []? Progressing: []? Met: []? Not Met: []? Adjusted  2. Patient will demonstrate increased AROM to wfl to allow for proper joint functioning as indicated by Functional Deficits. []? Progressing: []? Met: []? Not Met: []? Adjusted  3. Patient will demonstrate an increase in NM recruitment/activation and overall GH and scapular strength to within n5lbs HHD or WNL for proper functional mobility as indicated by patients Functional Deficits. []? Progressing: []? Met: []? Not Met: []? Adjusted  4. Patient will return to 60%  activities without increased symptoms or restriction. []? Progressing: []? Met: []? Not Met: []? Adjusted  5. (patient specific functional goal)       []? Progressing: []? Met: []? Not Met: []? Adjusted    ASSESSMENT:  See eval    Treatment/Activity Tolerance:  [x] Patient tolerated treatment well [] Patient limited by fatique  [] Patient limited by pain  [] Patient limited by other medical complications  [] Other:     Overall Progression Towards Functional goals/ Treatment Progress Update:  [] Patient is progressing as expected towards functional goals listed. [] Progression is slowed due to complexities/Impairments listed. [] Progression has been slowed due to co-morbidities.   [x] Plan just implemented, too soon to assess goals progression <30days   [] Goals require adjustment due to lack of progress  [] Patient is not progressing as expected and requires additional follow up with physician  [] Other    Prognosis for POC: [x] Good [] Fair  [] Poor    Patient requires continued skilled intervention: [x] Yes  [] No      PLAN: UE arom, aarom, prom, strengthening, scapular strength, myofascial release, joint mobs to gh, sc, ac, scapular thoracic, modalities for pain, hep, poor posture, very tight shoulder jnts, pecs, increased thoracic kyphosis    [] Continue per plan of care [] Alter current plan (see comments)  [x] Plan of care initiated [] Hold pending MD visit [] Discharge    Electronically signed by: Nahid Olivares PT     Note: If patient does not return for scheduled/recommended follow up visits, this note will serve as a discharge from care along with the most recent update on progress.

## 2022-03-15 ENCOUNTER — HOSPITAL ENCOUNTER (OUTPATIENT)
Dept: PHYSICAL THERAPY | Age: 67
Setting detail: THERAPIES SERIES
Discharge: HOME | End: 2022-03-15
Payer: COMMERCIAL

## 2022-03-15 PROCEDURE — 97110 THERAPEUTIC EXERCISES: CPT

## 2022-03-15 PROCEDURE — 97140 MANUAL THERAPY 1/> REGIONS: CPT

## 2022-03-15 NOTE — FLOWSHEET NOTE
[address] [facility] [address]  Phone: [phone]   Fax:     [phone]        Physical Therapy Treatment Note/ Progress Report:       Date:  3/15/2022    Patient Name:  Kerri Field    :  1955  MRN: [id number]    Pertinent Medical History:Additional Pertinent Hx: arthritis, dm2, htn, hpl, chronic back pain, Multiple ortho surgeries, hip, knee, shoulder    Medical/Treatment Diagnosis Information:  ·   M77.12 (ICD-10-CM) - Lateral epicondylitis, left elbow  · Treatment Diagnosis: decreased abilty to function and use ue    Insurance/Certification information:    medicare  Physician Information:   [facility]  Plan of care signed (Y/N):     Date of Patient follow up with Physician:      Progress Report: []  Yes  [x]  No     Date Range for reporting period:  Beginning:  3/15/2022  Ending:      Progress report due (10 Rx/or 30 days whichever is less):      Recertification due (POC duration/ or 90 days whichever is less):      Visit # POC/Insurance Allowable Auth Needed   3 12 []Yes    []No     Functional Outcomes Measure:   Date Assessed:  Qd- 37    Pain level:  5/10     History of Injury:  Patient is a 73 y/o male with a hx of left elbow pain for the past few months . His mri showed an extensor expansion tear. He c/o constant pain in his left elbow with edema which goes to his shoulder and hand at times. He denies numbness. He can't use his arm like he needs. He wakes on occasion. He has had a shoulder surgery. He hopes to have less pain. SUBJECTIVE:  Pt states, \" I'm having a ;lot of pain in my shoulder, elbow, and wrist \"  3/10/22  Pt states, \" elbow pain off and on, same with shoulder \"  03/15/22 15 min late. Patient reports elbow is feeling a little better.     OBJECTIVE:    See eval       RESTRICTIONS/PRECAUTIONS:     Exercises/Interventions:   Therapeutic Ex (49572)  Min:15 Resistance/Reps Cues/Notes   Nu step     SCAP RETRACT X 30    bilat ext Green x 30    W red x 30    Biceps curls, triceps curls Triceps blue x 30    Sup/pron     Add/ext Black x 30    pecs stretch 60 x 2                        Manual Intervention  (56261)  Min:25 Mfr to pecs,rtc, wrist extensors, wrist flex, prom all shoulder and elbow motions              NMR re-education (23429)  Min:               Therapeutic Activity (30937)  Min:               Modalities:  Min                 Other Therapeutic Activities:  Pt was educated on PT POC, Diagnosis, Prognosis, pathomechanics as well as frequency and duration of scheduling future physical therapy appointments. Time was also taken on this day to answer all patient questions and participation in PT. Reviewed appointment policy in detail with patient and patient verbalized understanding. Home Exercise Program: Patient demonstrated proper technique, good tolerance,  and was given written instructions for the above exercises  Access Code: [email or web address]  URL: [email or web address]  Date: 03/01/2022  Prepared by: Sharee Dakin    Exercises  Standing shoulder flexion wall slides - 1 x daily - 7 x weekly - 3 sets - 10 reps  Seated Scapular Retraction - 1 x daily - 7 x weekly - 3 sets - 10 reps  Doorway Pec Stretch at 60 Degrees Abduction with Arm Straight - 1 x daily - 7 x weekly - 3 sets - 10 reps      Therapeutic Exercise and NMR EXR  [] (98594) Provided verbal/tactile cueing for activities related to strengthening, flexibility, endurance, ROM  for improvements in scapular, scapulothoracic and UE control with self care, reaching, carrying, lifting, house/yardwork, driving/computer work.    [] (59836) Provided verbal/tactile cueing for activities related to improving balance, coordination, kinesthetic sense, posture, motor skill, proprioception  to assist with  scapular, scapulothoracic and UE control with self care, reaching, carrying, lifting, house/yardwork, driving/computer work. Therapeutic Activities:    [] (31748 or 83849) Provided verbal/tactile cueing for activities related to improving balance, coordination, kinesthetic sense, posture, motor skill, proprioception and motor activation to allow for proper function of scapular, scapulothoracic and UE control with self care, carrying, lifting, driving/computer work.      Home Exercise Program:    [x] (85546) Reviewed/Progressed HEP activities related to strengthening, flexibility, endurance, ROM of scapular, scapulothoracic and UE control with self care, reaching, carrying, lifting, house/yardwork, driving/computer work  [] (29023) Reviewed/Progressed HEP activities related to improving balance, coordination, kinesthetic sense, posture, motor skill, proprioception of scapular, scapulothoracic and UE control with self care, reaching, carrying, lifting, house/yardwork, driving/computer work      Manual Treatments:  PROM / STM / Oscillations-Mobs:  G-I, II, III, IV (PA's, Inf., Post.)  [] (93127) Provided manual therapy to mobilize soft tissue/joints of cervical/CT, scapular GHJ and UE for the purpose of modulating pain, promoting relaxation,  increasing ROM, reducing/eliminating soft tissue swelling/inflammation/restriction, improving soft tissue extensibility and allowing for proper ROM for normal function with self care, reaching, carrying, lifting, house/yardwork, driving/computer work    Charges:  Timed Code Treatment Minutes: 45   Total Treatment Minutes: 50       [] EVAL (LOW) 10556 (typically 20 minutes face-to-face)  [] EVAL (MOD) 98462 (typically 30 minutes face-to-face)  [] EVAL (HIGH) 41214 (typically 45 minutes face-to-face)  [] RE-EVAL     [x] IQ(01838) x 1    [] Dry needle 1 or 2 Muscles (92816)  [] NMR (66169) x     [] Dry needle 3+ Muscles (64190)  [x] Manual (64819) x 2   [] Ultrasound (10570) x  [] TA (03872) x     [] Mech Traction (92444)  [] ES(attended) (10352)     [] ES (un) (27573):   [] Vasopump (25474) [] Ionto (56304)   [] Other:      GOALS:  Patient stated goal: \" I want to have less pain and be able to use my arm \"  []? Progressing: []? Met: []? Not Met: []? Adjusted     Therapist goals for Patient:   Short Term Goals: To be achieved in: 2 weeks  1. Independent in HEP and progression per patient tolerance, in order to prevent re-injury. []? Progressing: []? Met: []? Not Met: []? Adjusted  2. Patient will have a decrease in pain to facilitate improvement in movement, function, and ADLs as indicated by Functional Deficits. []? Progressing: []? Met: []? Not Met: []? Adjusted     Long Term Goals: To be achieved in: 6 weeks  1. Disability index score of 40%% or less for the Quick DASH to assist with reaching prior level of function. []? Progressing: []? Met: []? Not Met: []? Adjusted  2. Patient will demonstrate increased AROM to wfl to allow for proper joint functioning as indicated by Functional Deficits. []? Progressing: []? Met: []? Not Met: []? Adjusted  3. Patient will demonstrate an increase in NM recruitment/activation and overall GH and scapular strength to within n5lbs HHD or WNL for proper functional mobility as indicated by patients Functional Deficits. []? Progressing: []? Met: []? Not Met: []? Adjusted  4. Patient will return to 60%  activities without increased symptoms or restriction. []? Progressing: []? Met: []? Not Met: []? Adjusted  5. (patient specific functional goal)       []? Progressing: []? Met: []? Not Met: []? Adjusted    ASSESSMENT:  See eval    Treatment/Activity Tolerance:  [x] Patient tolerated treatment well [] Patient limited by fatique  [] Patient limited by pain  [] Patient limited by other medical complications  [] Other:     Overall Progression Towards Functional goals/ Treatment Progress Update:  [] Patient is progressing as expected towards functional goals listed. [] Progression is slowed due to complexities/Impairments listed.   [] Progression has been slowed due to co-morbidities. [x] Plan just implemented, too soon to assess goals progression <30days   [] Goals require adjustment due to lack of progress  [] Patient is not progressing as expected and requires additional follow up with physician  [] Other    Prognosis for POC: [x] Good [] Fair  [] Poor    Patient requires continued skilled intervention: [x] Yes  [] No      PLAN: UE arom, aarom, prom, strengthening, scapular strength, myofascial release, joint mobs to gh, sc, ac, scapular thoracic, modalities for pain, hep, poor posture, very tight shoulder jnts, pecs, increased thoracic kyphosis    [] Continue per plan of care [] Alter current plan (see comments)  [x] Plan of care initiated [] Hold pending MD visit [] Discharge    Electronically signed by: Alicia Garrido PTA     Note: If patient does not return for scheduled/recommended follow up visits, this note will serve as a discharge from care along with the most recent update on progress.

## 2022-03-17 ENCOUNTER — HOSPITAL ENCOUNTER (OUTPATIENT)
Dept: PHYSICAL THERAPY | Age: 67
Setting detail: THERAPIES SERIES
Discharge: HOME | End: 2022-03-17
Payer: COMMERCIAL

## 2022-03-17 PROCEDURE — 97110 THERAPEUTIC EXERCISES: CPT

## 2022-03-17 PROCEDURE — 97140 MANUAL THERAPY 1/> REGIONS: CPT

## 2022-03-17 NOTE — FLOWSHEET NOTE
[address] [facility] [address]  Phone: [phone]   Fax:     [phone]        Physical Therapy Treatment Note/ Progress Report:       Date:  3/17/2022    Patient Name:  Marcio Monahan    :  1955  MRN: [id number]    Pertinent Medical History:Additional Pertinent Hx: arthritis, dm2, htn, hpl, chronic back pain, Multiple ortho surgeries, hip, knee, shoulder    Medical/Treatment Diagnosis Information:  ·   M77.12 (ICD-10-CM) - Lateral epicondylitis, left elbow  · Treatment Diagnosis: decreased abilty to function and use ue    Insurance/Certification information:    medicare  Physician Information:   Chemo Cam  Plan of care signed (Y/N): routed    Date of Patient follow up with Physician:      Progress Report: []  Yes  [x]  No     Date Range for reporting period:  Beginning:  3/17/2022  Ending:      Progress report due (10 Rx/or 30 days whichever is less):      Recertification due (POC duration/ or 90 days whichever is less):      Visit # POC/Insurance Allowable Auth Needed   4 12 []Yes    []No     Functional Outcomes Measure:   Date Assessed:  Qd- 37    Pain level:  5/10     History of Injury:  Patient is a 73 y/o male with a hx of left elbow pain for the past few months . His mri showed an extensor expansion tear. He c/o constant pain in his left elbow with edema which goes to his shoulder and hand at times. He denies numbness. He can't use his arm like he needs. He wakes on occasion. He has had a shoulder surgery. He hopes to have less pain. SUBJECTIVE:  Pt states, \" I'm having a ;lot of pain in my shoulder, elbow, and wrist \"  3/10/22  Pt states, \" elbow pain off and on, same with shoulder \"  03/15/22 15 min late. Patient reports elbow is feeling a little better.   3/17/22  Pt states, \" Arm is doing better \"    OBJECTIVE:    See eval       RESTRICTIONS/PRECAUTIONS: Exercises/Interventions:   Therapeutic Ex (13914)  Min:15 Resistance/Reps Cues/Notes   Nu step     SCAP RETRACT X 30    bilat ext Green x 30    W red x 30    Biceps curls, triceps curls Triceps blue x 30    Sup/pron Hammer x 30    Add/ext Black x 30    pecs stretch 60 x 2    Open book left side X 2 min    Sl er 2# x 30              Manual Intervention  (41904)  Min:25 Mfr to pecs,rtc, wrist extensors, wrist flex, prom all shoulder and elbow motions              NMR re-education (38660)  Min:               Therapeutic Activity (52833)  Min:               Modalities:  Min                 Other Therapeutic Activities:  Pt was educated on PT POC, Diagnosis, Prognosis, pathomechanics as well as frequency and duration of scheduling future physical therapy appointments. Time was also taken on this day to answer all patient questions and participation in PT. Reviewed appointment policy in detail with patient and patient verbalized understanding. Home Exercise Program: Patient demonstrated proper technique, good tolerance,  and was given written instructions for the above exercises  Access Code: [email or web address]  URL: [email or web address]  Date: 03/01/2022  Prepared by: Eliecer Cohen    Exercises  Standing shoulder flexion wall slides - 1 x daily - 7 x weekly - 3 sets - 10 reps  Seated Scapular Retraction - 1 x daily - 7 x weekly - 3 sets - 10 reps  Doorway Pec Stretch at 60 Degrees Abduction with Arm Straight - 1 x daily - 7 x weekly - 3 sets - 10 reps      Therapeutic Exercise and NMR EXR  [] (41205) Provided verbal/tactile cueing for activities related to strengthening, flexibility, endurance, ROM  for improvements in scapular, scapulothoracic and UE control with self care, reaching, carrying, lifting, house/yardwork, driving/computer work.    [] (97754) Provided verbal/tactile cueing for activities related to improving balance, coordination, kinesthetic sense, posture, motor skill, proprioception  to assist with scapular, scapulothoracic and UE control with self care, reaching, carrying, lifting, house/yardwork, driving/computer work. Therapeutic Activities:    [] (12655 or 22009) Provided verbal/tactile cueing for activities related to improving balance, coordination, kinesthetic sense, posture, motor skill, proprioception and motor activation to allow for proper function of scapular, scapulothoracic and UE control with self care, carrying, lifting, driving/computer work.      Home Exercise Program:    [x] (95265) Reviewed/Progressed HEP activities related to strengthening, flexibility, endurance, ROM of scapular, scapulothoracic and UE control with self care, reaching, carrying, lifting, house/yardwork, driving/computer work  [] (96888) Reviewed/Progressed HEP activities related to improving balance, coordination, kinesthetic sense, posture, motor skill, proprioception of scapular, scapulothoracic and UE control with self care, reaching, carrying, lifting, house/yardwork, driving/computer work      Manual Treatments:  PROM / STM / Oscillations-Mobs:  G-I, II, III, IV (PA's, Inf., Post.)  [] (01794) Provided manual therapy to mobilize soft tissue/joints of cervical/CT, scapular GHJ and UE for the purpose of modulating pain, promoting relaxation,  increasing ROM, reducing/eliminating soft tissue swelling/inflammation/restriction, improving soft tissue extensibility and allowing for proper ROM for normal function with self care, reaching, carrying, lifting, house/yardwork, driving/computer work    Charges:  Timed Code Treatment Minutes: 45   Total Treatment Minutes: 50       [] EVAL (LOW) 29414 (typically 20 minutes face-to-face)  [] EVAL (MOD) 52385 (typically 30 minutes face-to-face)  [] EVAL (HIGH) 36903 (typically 45 minutes face-to-face)  [] RE-EVAL     [x] BQ(90910) x 1    [] Dry needle 1 or 2 Muscles (44292)  [] NMR (26335) x     [] Dry needle 3+ Muscles (48056)  [x] Manual (78868) x 2   [] Ultrasound (54513) x  [] TA (65847) x     [] [facility] Traction (66145)  [] ES(attended) (74449)     [] ES (un) (82561):   [] Vasopump (36507) [] Ionto (37513)   [] Other:      GOALS:  Patient stated goal: \" I want to have less pain and be able to use my arm \"  []? Progressing: []? Met: []? Not Met: []? Adjusted     Therapist goals for Patient:   Short Term Goals: To be achieved in: 2 weeks  1. Independent in HEP and progression per patient tolerance, in order to prevent re-injury. []? Progressing: []? Met: []? Not Met: []? Adjusted  2. Patient will have a decrease in pain to facilitate improvement in movement, function, and ADLs as indicated by Functional Deficits. []? Progressing: []? Met: []? Not Met: []? Adjusted     Long Term Goals: To be achieved in: 6 weeks  1. Disability index score of 40%% or less for the Quick DASH to assist with reaching prior level of function. []? Progressing: []? Met: []? Not Met: []? Adjusted  2. Patient will demonstrate increased AROM to wfl to allow for proper joint functioning as indicated by Functional Deficits. []? Progressing: []? Met: []? Not Met: []? Adjusted  3. Patient will demonstrate an increase in NM recruitment/activation and overall GH and scapular strength to within n5lbs HHD or WNL for proper functional mobility as indicated by patients Functional Deficits. []? Progressing: []? Met: []? Not Met: []? Adjusted  4. Patient will return to 60%  activities without increased symptoms or restriction. []? Progressing: []? Met: []? Not Met: []? Adjusted  5. (patient specific functional goal)       []? Progressing: []? Met: []? Not Met: []?  Adjusted    ASSESSMENT:  See eval    Treatment/Activity Tolerance:  [x] Patient tolerated treatment well [] Patient limited by fatique  [] Patient limited by pain  [] Patient limited by other medical complications  [] Other:     Overall Progression Towards Functional goals/ Treatment Progress Update:  [] Patient is progressing as expected towards functional goals listed. [] Progression is slowed due to complexities/Impairments listed. [] Progression has been slowed due to co-morbidities. [x] Plan just implemented, too soon to assess goals progression <30days   [] Goals require adjustment due to lack of progress  [] Patient is not progressing as expected and requires additional follow up with physician  [] Other    Prognosis for POC: [x] Good [] Fair  [] Poor    Patient requires continued skilled intervention: [x] Yes  [] No      PLAN: UE arom, aarom, prom, strengthening, scapular strength, myofascial release, joint mobs to gh, sc, ac, scapular thoracic, modalities for pain, hep, poor posture, very tight shoulder jnts, pecs, increased thoracic kyphosis    [] Continue per plan of care [] Alter current plan (see comments)  [x] Plan of care initiated [] Hold pending MD visit [] Discharge    Electronically signed by: Jensen Castro, PT     Note: If patient does not return for scheduled/recommended follow up visits, this note will serve as a discharge from care along with the most recent update on progress.

## 2022-03-22 ENCOUNTER — HOSPITAL ENCOUNTER (OUTPATIENT)
Dept: PHYSICAL THERAPY | Age: 67
Setting detail: THERAPIES SERIES
Discharge: HOME | End: 2022-03-22
Payer: COMMERCIAL

## 2022-03-22 PROCEDURE — 97110 THERAPEUTIC EXERCISES: CPT

## 2022-03-22 PROCEDURE — 97140 MANUAL THERAPY 1/> REGIONS: CPT

## 2022-03-22 NOTE — FLOWSHEET NOTE
[address] [facility] [address]  Phone: [phone]   Fax:     [phone]        Physical Therapy Treatment Note/ Progress Report:       Date:  3/22/2022    Patient Name:  Ofelia Carey    :  1955  MRN: [id number]    Pertinent Medical History:Additional Pertinent Hx: arthritis, dm2, htn, hpl, chronic back pain, Multiple ortho surgeries, hip, knee, shoulder    Medical/Treatment Diagnosis Information:  ·   M77.12 (ICD-10-CM) - Lateral epicondylitis, left elbow  · Treatment Diagnosis: decreased abilty to function and use ue    Insurance/Certification information:    medicare  Physician Information:   Padmini Maldonado  Plan of care signed (Y/N): routed    Date of Patient follow up with Physician:      Progress Report: []  Yes  [x]  No     Date Range for reporting period:  Beginning:  3/22/2022  Ending:      Progress report due (10 Rx/or 30 days whichever is less):      Recertification due (POC duration/ or 90 days whichever is less):      Visit # POC/Insurance Allowable Auth Needed   5 12 []Yes    []No     Functional Outcomes Measure:   Date Assessed:  Qd- 37    Pain level:  5/10     History of Injury:  Patient is a 75 y/o male with a hx of left elbow pain for the past few months . His mri showed an extensor expansion tear. He c/o constant pain in his left elbow with edema which goes to his shoulder and hand at times. He denies numbness. He can't use his arm like he needs. He wakes on occasion. He has had a shoulder surgery. He hopes to have less pain. SUBJECTIVE:  Pt states, \" I'm having a ;lot of pain in my shoulder, elbow, and wrist \"  3/10/22  Pt states, \" elbow pain off and on, same with shoulder \"  03/15/22 15 min late. Patient reports elbow is feeling a little better.   3/17/22  Pt states, \" Arm is doing better \"  22 Patient reports elbow was a little sore over the coordination, kinesthetic sense, posture, motor skill, proprioception  to assist with  scapular, scapulothoracic and UE control with self care, reaching, carrying, lifting, house/yardwork, driving/computer work. Therapeutic Activities:    [] (10808 or 21432) Provided verbal/tactile cueing for activities related to improving balance, coordination, kinesthetic sense, posture, motor skill, proprioception and motor activation to allow for proper function of scapular, scapulothoracic and UE control with self care, carrying, lifting, driving/computer work.      Home Exercise Program:    [x] (58586) Reviewed/Progressed HEP activities related to strengthening, flexibility, endurance, ROM of scapular, scapulothoracic and UE control with self care, reaching, carrying, lifting, house/yardwork, driving/computer work  [] (68108) Reviewed/Progressed HEP activities related to improving balance, coordination, kinesthetic sense, posture, motor skill, proprioception of scapular, scapulothoracic and UE control with self care, reaching, carrying, lifting, house/yardwork, driving/computer work      Manual Treatments:  PROM / STM / Oscillations-Mobs:  G-I, II, III, IV (PA's, Inf., Post.)  [] (99212) Provided manual therapy to mobilize soft tissue/joints of cervical/CT, scapular GHJ and UE for the purpose of modulating pain, promoting relaxation,  increasing ROM, reducing/eliminating soft tissue swelling/inflammation/restriction, improving soft tissue extensibility and allowing for proper ROM for normal function with self care, reaching, carrying, lifting, house/yardwork, driving/computer work    Charges:  Timed Code Treatment Minutes: 45   Total Treatment Minutes: 50       [] EVAL (LOW) 16855 (typically 20 minutes face-to-face)  [] EVAL (MOD) 44501 (typically 30 minutes face-to-face)  [] EVAL (HIGH) 38204 (typically 45 minutes face-to-face)  [] RE-EVAL     [x] BZ(45783) x 1    [] Dry needle 1 or 2 Muscles (04721)  [] NMR (02238) x [] Dry needle 3+ Muscles (72289)  [x] Manual (22773) x 2   [] Ultrasound (27855) x  [] TA (54188) x     [] Mech Traction (87255)  [] ES(attended) (43971)     [] ES (un) (59115):   [] Vasopump (22951) [] Ionto (94525)   [] Other:      GOALS:  Patient stated goal: \" I want to have less pain and be able to use my arm \"  []? Progressing: []? Met: []? Not Met: []? Adjusted     Therapist goals for Patient:   Short Term Goals: To be achieved in: 2 weeks  1. Independent in HEP and progression per patient tolerance, in order to prevent re-injury. []? Progressing: []? Met: []? Not Met: []? Adjusted  2. Patient will have a decrease in pain to facilitate improvement in movement, function, and ADLs as indicated by Functional Deficits. []? Progressing: []? Met: []? Not Met: []? Adjusted     Long Term Goals: To be achieved in: 6 weeks  1. Disability index score of 40%% or less for the Quick DASH to assist with reaching prior level of function. []? Progressing: []? Met: []? Not Met: []? Adjusted  2. Patient will demonstrate increased AROM to wfl to allow for proper joint functioning as indicated by Functional Deficits. []? Progressing: []? Met: []? Not Met: []? Adjusted  3. Patient will demonstrate an increase in NM recruitment/activation and overall GH and scapular strength to within n5lbs HHD or WNL for proper functional mobility as indicated by patients Functional Deficits. []? Progressing: []? Met: []? Not Met: []? Adjusted  4. Patient will return to 60%  activities without increased symptoms or restriction. []? Progressing: []? Met: []? Not Met: []? Adjusted  5. (patient specific functional goal)       []? Progressing: []? Met: []? Not Met: []?  Adjusted    ASSESSMENT:  See eval    Treatment/Activity Tolerance:  [x] Patient tolerated treatment well [] Patient limited by fatique  [] Patient limited by pain  [] Patient limited by other medical complications  [] Other:     Overall Progression Towards Functional goals/ Treatment Progress Update:  [] Patient is progressing as expected towards functional goals listed. [] Progression is slowed due to complexities/Impairments listed. [] Progression has been slowed due to co-morbidities. [x] Plan just implemented, too soon to assess goals progression <30days   [] Goals require adjustment due to lack of progress  [] Patient is not progressing as expected and requires additional follow up with physician  [] Other    Prognosis for POC: [x] Good [] Fair  [] Poor    Patient requires continued skilled intervention: [x] Yes  [] No      PLAN: UE arom, aarom, prom, strengthening, scapular strength, myofascial release, joint mobs to gh, sc, ac, scapular thoracic, modalities for pain, hep, poor posture, very tight shoulder jnts, pecs, increased thoracic kyphosis    [] Continue per plan of care [] Alter current plan (see comments)  [x] Plan of care initiated [] Hold pending MD visit [] Discharge    Electronically signed by: Brandi Zheng PTA     Note: If patient does not return for scheduled/recommended follow up visits, this note will serve as a discharge from care along with the most recent update on progress.

## 2022-03-24 ENCOUNTER — HOSPITAL ENCOUNTER (OUTPATIENT)
Dept: PHYSICAL THERAPY | Age: 67
Setting detail: THERAPIES SERIES
Discharge: HOME | End: 2022-03-24
Payer: COMMERCIAL

## 2022-03-24 NOTE — FLOWSHEET NOTE
[facility] [address]  [address]  Phone: [phone]   Fax:     [phone]    Physical Therapy  Cancellation/No-show Note  Patient Name:  Janette Villalpando  :  1955   Date:  3/24/2022  Cancelled visits to date: 2  No-shows to date: 0    Patient status for today's appointment patient:  [x]  Cancelled  []  Rescheduled appointment  []  No-show     Reason given by patient:  []  Patient ill  []  Conflicting appointment  []  No transportation    []  Conflict with work  []  No reason given  [x]  Other:     Comments:  Emergency come up    Phone call information:   []  Phone call made today to patient at _ time at number provided:      []  Patient answered, conversation as follows:    []  Patient did not answer, message left as follows:  []  Phone call not made today    Electronically signed by:  Ada Yates PT

## 2022-04-05 ENCOUNTER — HOSPITAL ENCOUNTER (OUTPATIENT)
Dept: PHYSICAL THERAPY | Age: 67
Setting detail: THERAPIES SERIES
Discharge: HOME | End: 2022-04-05
Payer: COMMERCIAL

## 2022-04-05 PROCEDURE — 97140 MANUAL THERAPY 1/> REGIONS: CPT

## 2022-04-05 PROCEDURE — 97110 THERAPEUTIC EXERCISES: CPT

## 2022-04-05 NOTE — FLOWSHEET NOTE
[address] [facility] [address]  Phone: [phone]   Fax:     [phone]        Physical Therapy Treatment Note/ Progress Report:       Date:  2022    Patient Name:  Lily Hylton    :  1955  MRN: [id number]    Pertinent Medical History:Additional Pertinent Hx: arthritis, dm2, htn, hpl, chronic back pain, Multiple ortho surgeries, hip, knee, shoulder    Medical/Treatment Diagnosis Information:  ·   M77.12 (ICD-10-CM) - Lateral epicondylitis, left elbow  · Treatment Diagnosis: decreased abilty to function and use ue    Insurance/Certification information:    medicare  Physician Information:   Rand Gonzales  Plan of care signed (Y/N): routed    Date of Patient follow up with Physician:      Progress Report: []  Yes  [x]  No     Date Range for reporting period:  Beginnin2022  Ending:      Progress report due (10 Rx/or 30 days whichever is less):      Recertification due (POC duration/ or 90 days whichever is less):      Visit # POC/Insurance Allowable Auth Needed    []Yes    []No     Functional Outcomes Measure:   Date Assessed:  Qd- 37    Pain level:  5/10     History of Injury:  Patient is a 73 y/o male with a hx of left elbow pain for the past few months . His mri showed an extensor expansion tear. He c/o constant pain in his left elbow with edema which goes to his shoulder and hand at times. He denies numbness. He can't use his arm like he needs. He wakes on occasion. He has had a shoulder surgery. He hopes to have less pain. SUBJECTIVE:  Pt states, \" I'm having a ;lot of pain in my shoulder, elbow, and wrist \"  3/10/22  Pt states, \" elbow pain off and on, same with shoulder \"  03/15/22 15 min late. Patient reports elbow is feeling a little better.   3/17/22  Pt states, \" Arm is doing better \"  22 Patient reports elbow was a little sore over the weekend,overall is improving. 4/5/22 Pt states, \" Better overall, occasional pan in the elbow \"    OBJECTIVE:    See eval       RESTRICTIONS/PRECAUTIONS:     Exercises/Interventions:   Therapeutic Ex (93262)  Min:15 Resistance/Reps Cues/Notes   Nu step     SCAP RETRACT X 30    bilat ext Green x 30    W red x 30    Biceps curls, triceps curls Triceps blue x 30    Sup/pron Hammer x 30    Add/ext Black x 30    pecs stretch 60 x 2    Open book left side X 2 min    Sl er 2# x 30              Manual Intervention  (40236)  Min:25 Mfr to pecs,rtc, wrist extensors, wrist flex, prom all shoulder and elbow motions              NMR re-education (80742)  Min:               Therapeutic Activity (86452)  Min:               Modalities:  Min                 Other Therapeutic Activities:  Pt was educated on PT POC, Diagnosis, Prognosis, pathomechanics as well as frequency and duration of scheduling future physical therapy appointments. Time was also taken on this day to answer all patient questions and participation in PT. Reviewed appointment policy in detail with patient and patient verbalized understanding. Home Exercise Program: Patient demonstrated proper technique, good tolerance,  and was given written instructions for the above exercises  Access Code: [email or web address]  URL: [email or web address]  Date: 03/01/2022  Prepared by: Ranjeet Duenas    Exercises  Standing shoulder flexion wall slides - 1 x daily - 7 x weekly - 3 sets - 10 reps  Seated Scapular Retraction - 1 x daily - 7 x weekly - 3 sets - 10 reps  Doorway Pec Stretch at 60 Degrees Abduction with Arm Straight - 1 x daily - 7 x weekly - 3 sets - 10 reps      Therapeutic Exercise and NMR EXR  [] (86954) Provided verbal/tactile cueing for activities related to strengthening, flexibility, endurance, ROM  for improvements in scapular, scapulothoracic and UE control with self care, reaching, carrying, lifting, house/yardwork, driving/computer work.    [] (35396) Provided verbal/tactile cueing for activities related to improving balance, coordination, kinesthetic sense, posture, motor skill, proprioception  to assist with  scapular, scapulothoracic and UE control with self care, reaching, carrying, lifting, house/yardwork, driving/computer work. Therapeutic Activities:    [] (80543 or 91954) Provided verbal/tactile cueing for activities related to improving balance, coordination, kinesthetic sense, posture, motor skill, proprioception and motor activation to allow for proper function of scapular, scapulothoracic and UE control with self care, carrying, lifting, driving/computer work.      Home Exercise Program:    [x] (23009) Reviewed/Progressed HEP activities related to strengthening, flexibility, endurance, ROM of scapular, scapulothoracic and UE control with self care, reaching, carrying, lifting, house/yardwork, driving/computer work  [] (52434) Reviewed/Progressed HEP activities related to improving balance, coordination, kinesthetic sense, posture, motor skill, proprioception of scapular, scapulothoracic and UE control with self care, reaching, carrying, lifting, house/yardwork, driving/computer work      Manual Treatments:  PROM / STM / Oscillations-Mobs:  G-I, II, III, IV (PA's, Inf., Post.)  [] (85260) Provided manual therapy to mobilize soft tissue/joints of cervical/CT, scapular GHJ and UE for the purpose of modulating pain, promoting relaxation,  increasing ROM, reducing/eliminating soft tissue swelling/inflammation/restriction, improving soft tissue extensibility and allowing for proper ROM for normal function with self care, reaching, carrying, lifting, house/yardwork, driving/computer work    Charges:  Timed Code Treatment Minutes: 45   Total Treatment Minutes: 50       [] EVAL (LOW) 80146 (typically 20 minutes face-to-face)  [] EVAL (MOD) 27051 (typically 30 minutes face-to-face)  [] EVAL (HIGH) 77717 (typically 45 minutes face-to-face)  [] RE-EVAL     [x] KL(22251) x 1    [] Dry needle 1 or 2 Muscles (48174)  [] NMR (31889) x     [] Dry needle 3+ Muscles (25691)  [x] Manual (57292) x 2   [] Ultrasound (82253) x  [] TA (96029) x     [] Mech Traction (25268)  [] ES(attended) (18242)     [] ES (un) (95068):   [] Vasopump (27163) [] Ionto (16709)   [] Other:      GOALS:  Patient stated goal: \" I want to have less pain and be able to use my arm \"  []? Progressing: []? Met: []? Not Met: []? Adjusted     Therapist goals for Patient:   Short Term Goals: To be achieved in: 2 weeks  1. Independent in HEP and progression per patient tolerance, in order to prevent re-injury. []? Progressing: []? Met: []? Not Met: []? Adjusted  2. Patient will have a decrease in pain to facilitate improvement in movement, function, and ADLs as indicated by Functional Deficits. []? Progressing: []? Met: []? Not Met: []? Adjusted     Long Term Goals: To be achieved in: 6 weeks  1. Disability index score of 40%% or less for the Quick DASH to assist with reaching prior level of function. []? Progressing: []? Met: []? Not Met: []? Adjusted  2. Patient will demonstrate increased AROM to wfl to allow for proper joint functioning as indicated by Functional Deficits. []? Progressing: []? Met: []? Not Met: []? Adjusted  3. Patient will demonstrate an increase in NM recruitment/activation and overall GH and scapular strength to within n5lbs HHD or WNL for proper functional mobility as indicated by patients Functional Deficits. []? Progressing: []? Met: []? Not Met: []? Adjusted  4. Patient will return to 60%  activities without increased symptoms or restriction. []? Progressing: []? Met: []? Not Met: []? Adjusted  5. (patient specific functional goal)       []? Progressing: []? Met: []? Not Met: []?  Adjusted    ASSESSMENT:  See eval    Treatment/Activity Tolerance:  [x] Patient tolerated treatment well [] Patient limited by fatique  [] Patient limited by pain  [] Patient limited by other medical complications  [] Other:     Overall Progression Towards Functional goals/ Treatment Progress Update:  [x] Patient is progressing as expected towards functional goals listed. [] Progression is slowed due to complexities/Impairments listed. [] Progression has been slowed due to co-morbidities. [] Plan just implemented, too soon to assess goals progression <30days   [] Goals require adjustment due to lack of progress  [] Patient is not progressing as expected and requires additional follow up with physician  [] Other    Prognosis for POC: [x] Good [] Fair  [] Poor    Patient requires continued skilled intervention: [x] Yes  [] No      PLAN: UE arom, aarom, prom, strengthening, scapular strength, myofascial release, joint mobs to gh, sc, ac, scapular thoracic, modalities for pain, hep, poor posture, very tight shoulder jnts, pecs, increased thoracic kyphosis    [] Continue per plan of care [] Alter current plan (see comments)  [x] Plan of care initiated [] Hold pending MD visit [] Discharge    Electronically signed by: Rosalba Cunningham PT     Note: If patient does not return for scheduled/recommended follow up visits, this note will serve as a discharge from care along with the most recent update on progress.

## 2022-06-08 ENCOUNTER — OFFICE VISIT (OUTPATIENT)
Dept: ORTHOPEDIC SURGERY | Age: 67
End: 2022-06-08
Payer: COMMERCIAL

## 2022-06-08 DIAGNOSIS — Z96.642 STATUS POST TOTAL HIP REPLACEMENT, LEFT: ICD-10-CM

## 2022-06-08 DIAGNOSIS — M70.62 TROCHANTERIC BURSITIS OF LEFT HIP: Primary | ICD-10-CM

## 2022-06-08 DIAGNOSIS — M16.11 PRIMARY OSTEOARTHRITIS OF RIGHT HIP: ICD-10-CM

## 2022-06-08 PROCEDURE — 1036F TOBACCO NON-USER: CPT | Performed by: PHYSICIAN ASSISTANT

## 2022-06-08 PROCEDURE — 1123F ACP DISCUSS/DSCN MKR DOCD: CPT | Performed by: PHYSICIAN ASSISTANT

## 2022-06-08 PROCEDURE — G8417 CALC BMI ABV UP PARAM F/U: HCPCS | Performed by: PHYSICIAN ASSISTANT

## 2022-06-08 PROCEDURE — 3017F COLORECTAL CA SCREEN DOC REV: CPT | Performed by: PHYSICIAN ASSISTANT

## 2022-06-08 PROCEDURE — 99213 OFFICE O/P EST LOW 20 MIN: CPT | Performed by: PHYSICIAN ASSISTANT

## 2022-06-08 PROCEDURE — 20610 DRAIN/INJ JOINT/BURSA W/O US: CPT | Performed by: PHYSICIAN ASSISTANT

## 2022-06-08 PROCEDURE — G8427 DOCREV CUR MEDS BY ELIG CLIN: HCPCS | Performed by: PHYSICIAN ASSISTANT

## 2022-06-08 RX ORDER — BUPIVACAINE HYDROCHLORIDE 2.5 MG/ML
2 INJECTION, SOLUTION INFILTRATION; PERINEURAL ONCE
Status: COMPLETED | OUTPATIENT
Start: 2022-06-08 | End: 2022-06-08

## 2022-06-08 RX ORDER — TRIAMCINOLONE ACETONIDE 40 MG/ML
40 INJECTION, SUSPENSION INTRA-ARTICULAR; INTRAMUSCULAR ONCE
Status: COMPLETED | OUTPATIENT
Start: 2022-06-08 | End: 2022-06-08

## 2022-06-08 RX ADMIN — BUPIVACAINE HYDROCHLORIDE 5 MG: 2.5 INJECTION, SOLUTION INFILTRATION; PERINEURAL at 16:08

## 2022-06-08 RX ADMIN — TRIAMCINOLONE ACETONIDE 40 MG: 40 INJECTION, SUSPENSION INTRA-ARTICULAR; INTRAMUSCULAR at 16:09

## 2022-06-08 NOTE — PROGRESS NOTES
Subjective:      Patient ID: Jaya Logan is a 74 y.o. male who is here for follow up evaluation of his left lateral hip pain. He does have a history of a left total hip arthroplasty performed 4/4/2017. He also has trochanteric bursitis of the left hip. Last trochanteric injection 3/10/2022. He is now experiencing pain in the left lateral hip with mild anterior hip discomfort. He denies any traumatic event. Pain upon active left hip flexion as well as upon getting out of a chair or lying on his left side. Would also like to be evaluated for right groin pain. This started several weeks ago. Progressively worsening. No injury. History of having trochanteric bursitis on the right side with trochanteric injection 6/8/2020. Review of Systems:  I have reviewed the clinically relevant past medical history, medications, allergies, family history, social history, and 13 point Review of Systems from the patient's recent history form & documented any details relevant to today's presenting complaints in the history above. The patient's self-reported past medical history, medications, allergies, family history, social history, and Review of Systems form from 7/20/2021 have been scanned into the chart under the \"Media\" tab.          Past Medical History:   Diagnosis Date    Arthritis     Chronic back pain     Diabetes mellitus ([address]     Fracture of nasal bone     Headache     Hearing loss     High blood pressure     Hyperlipidemia     Wears partial dentures        Family History   Problem Relation Age of Onset    Heart Disease Mother     High Blood Pressure Mother     Diabetes Mother     Heart Disease Father     High Blood Pressure Father     Diabetes Father        Past Surgical History:   Procedure Laterality Date   [facility] SURGERY  10/2007   1979  1980    BRONCHOSCOPY      CARPAL TUNNEL RELEASE Bilateral     COLONOSCOPY      ESOPHAGUS SURGERY      HIP ARTHROPLASTY Left 04/04/2017    HIP SURGERY  04/2008    bursa removed    JOINT REPLACEMENT  2009, 2006, 2003, 1997    Left knee replacement    KNEE SURGERY Left     [id number] scopes    SHOULDER ARTHROSCOPY Left 7/26/2019    LEFT SHOULDER ARTHROSCOPY, SUBACROMIAL DECOMPRESSION, ROTATOR CUFF REPAIR performed by Vinayak Jones MD at [address] History     Occupational History    Not on file   Tobacco Use    Smoking status: Never Smoker    Smokeless tobacco: Never Used   Vaping Use    Vaping Use: Never used   Substance and Sexual Activity    Alcohol use: No    Drug use: No    Sexual activity: Not Currently       Current Outpatient Medications   Medication Sig Dispense Refill    pregabalin (LYRICA) 75 MG capsule       MULTIPLE VITAMIN PO Take by mouth      aspirin 81 MG EC tablet Take 81 mg by mouth      glipiZIDE (GLUCOTROL) 5 MG tablet       ONETOUCH ULTRA strip       nabumetone (RELAFEN) 750 MG tablet  (Patient not taking: Reported on 1/20/2022)      tiZANidine (ZANAFLEX) 4 MG tablet Take 4 mg by mouth 3 times daily      butalbital-APAP-caffeine (FIORICET) -40 MG CAPS per capsule Take 1 capsule by mouth every 6 hours as needed for Headaches 15 capsule 0    oxyCODONE-acetaminophen (PERCOCET) 5-325 MG per tablet Take 1 tablet by mouth every 8 hours as needed for Pain.       amitriptyline (ELAVIL) 75 MG tablet Take 75 mg by mouth nightly as needed       augmented betamethasone dipropionate (DIPROLENE-AF) 0.05 % cream Apply thin layer topically to L ankle every third day 45 g 1    metformin (GLUCOPHAGE) 500 MG tablet Take 500 mg by mouth 2 times daily (with meals) Takes only as needed      metoprolol (TOPROL-XL) 25 MG XL tablet Take 25 mg by mouth daily       zolpidem (AMBIEN) 5 MG tablet Take 5 mg by mouth nightly as needed       baclofen (LIORESAL) 10 MG tablet Take 10 mg by mouth as needed  (Patient not taking: Reported on 1/20/2022)      methadone (DOLOPHINE) 5 MG tablet Take 5 mg by mouth 2 times daily. (Patient not taking: Reported on 1/20/2022)      Multiple Vitamins-Minerals (CENTRUM SILVER PO) Take  by mouth.  lisinopril-hydrochlorothiazide (PRINZIDE;ZESTORETIC) 20-25 MG per tablet Take 1 Tab by mouth daily.  atorvastatin (LIPITOR) 20 MG tablet Take 20 mg by mouth nightly        No current facility-administered medications for this visit. Objective:     He is alert, oriented x 3, pleasant, well nourished, developed and in no   acute distress. There were no vitals taken for this visit. Examination of the left hip:  Tender to palpate over the greater trochanter. Pain with active left hip flexion against resistance. No instability, crepitus with ROM. No pain with weight bearing. Gait is normal.      Examination of the right hip shows:  No discoloration, wounds or gross deformity. Greater trochanter/bursa palpation is tender. Anterior superior iliac spine is nontender. Ischial tuberosity is nontender. Sacroiliac joint is mildly tender. ROM:  -Flexion 120                      (Nml 120-135 degrees)  -Extension 20                  (Nml 20-30 degrees)  -Abduction 40                  (Nml 40-50 degrees)  -Internal rotation 30         (Nml 30 degrees)  -External rotation 45        (Nml 50 degrees)    [facility] resisted hip flexion test positive. Gait ( Nml, Antalgic, Trendelenberg)-normal.      Lower extremities:  He has 5/5 motor strength of bilateral lower extremities. He has a negative straight leg raise, bilaterally. Deep tendon reflexes at knees and achilles are 2+. Sensation is intact to light touch L3 to S1 bilaterally. He has no clonus. Examination of the lower extremities shows intact perfusion to both lower extremities. He has no cyanosis and digigts are warm to touch, capillary refill is less than 2 seconds. He has no edema noted.      He has intact skin without lacerations or abrasions, no significant erythema, rashes or skin lesions. X Rays: performed in the office today:   AP Pelvis, AP and Frog Leg Lateral  Left and Right Hip:   There is a left total hip arthroplasty present. The alignment is satisfactory. There are no signs of failure, dislocation or loosening. Mild degenerative changes of the right hip noted with early sclerosis and mild joint space narrowing. Diagnosis:       ICD-10-CM    1. Trochanteric bursitis of left hip  M70.62 OK ARTHROCENTESIS ASPIR&/INJ MAJOR JT/BURSA W/O US     triamcinolone acetonide (KENALOG-40) injection 40 mg     bupivacaine (MARCAINE) 0.25 % injection 5 mg   2. Primary osteoarthritis of right hip  M16.11 XR HIP 3-4 VW W PELVIS BILATERAL     FL ARTHR/ASP/INJ MAJOR JT/BURSA RT WO US   3. Status post total hip replacement, left 4/4/17  Z96.642 XR HIP 3-4 VW W PELVIS BILATERAL        Assessment and Plan:       Assessment:  Left lateral hip pain due to trochanteric bursitis. Left groin pain probably due to iliopsoas tendinitis. Right groin pain due to right hip arthritis. I had an extensive discussion with Mr. Jaya Logan regarding the natural history, etiology, and long term consequences of his condition. I have presented reasonable alternatives to the patient's proposed care, treatment, and services. Risks and benefits of the treatment options also reviewed in detail. I have outlined a treatment plan with them. He has had full opportunity to ask his questions. I have answered them all to his satisfaction. I feel that Mr. Jaya Logan understands our discussion today. Discussed at length conservative treatment of hip symptoms/arthritis, according to AAOS Clinical Practice Guidelines:  1)  Recommend oral or topical NSAIDs to reduce pain and swelling. 2)  Recommend acetaminophen to reduce pain.     3)  Oral narcotics, including tramadol, result in a significant increase of adverse events and are not effective at improving pain or function for treatment of osteoarthritis of the knee. 4)  Recommend maintaining weight in a healthy range to reduce stresses on the knee, particularly the patellofemoral compartment which sees up to 6x body weight. 5)  Home exercise program, focusing on strengthening, low impact aerobic exercises, and neuromuscular education. 6)  Intra-articular corticosteroid injections are an option. Informed patient corticosteroid injections can be performed every 3-4 months as needed. 7)  Cane use can improve pain and function. Although not specifically listed in the CPGs, ice therapy and topical patches such as Salonpas are good options as well. Surgical option, hip arthroplasty discussed. Plan:  Medications-   OTC NSAIDS discussed. 1. The most common side effects from NSAIDs are stomachaches, heartburn, and nausea. NSAIDs may irritate the stomach lining. If the medicine upsets your stomach, you can try taking it with food. But if that doesn't help, talk with your doctor to make sure it's not a more serious problem, such as a stomach ulcer or bleeding in the stomach or intestines. 2. Using NSAIDs may:  ? Lead to high blood pressure. ? Make symptoms of heart failure worse. ? Raise the risk of heart attack, stroke, kidney damage, and skin reactions. 3. Your risks are greater if you take NSAIDs at higher doses or for longer than the label says. People who are older than 72 or who have heart, stomach, or intestinal disease have a higher risk for problems. PT- A home exercise program was instructed today including ROM exercises and strengthening exercises. The patient verbalized understanding of these exercises as well as the importance of the exercise program to promote return of normal function. If pain intensifies or other problems arise you are to notify the office.      Procedures- Cortisone injection  PROCEDURE NOTE:  Pre op Diagnosis:  Trochanteric Bursitis  Post op Diagnosis: Same  With Quoc Agarwal permission, the left hip was prepped  in standard sterile fashion with  Alcohol and 2 cc of 0.25% Marcaine and 1 cc of Kenalog 40 mg was injected into the left lateral trochanteric region without difficulty. He tolerated this well without difficulty. A band-aid was applied. He was advised to ice the hip for 15-20 minutes to relieve any injection site related pain. I have recommended intra articular injection of the left hip for both diagnostic and therapeutic purpose. This will be performed under fluoroscopy to be sure the injection is indeed intra articular. This will be performed by Dr Vivian Matias and scheduled in the near future. The risks and benefits were discussed in detail today. All questions were answered. Luisa Gutierrez verbally consents to the procedure of intra-articular steroid hip injection. May consider left hip iliopsoas injection under ultrasound in the future. Follow up- 6 weeks. Call or return to clinic if these symptoms worsen or fail to improve as anticipated. Erik Ivey PA-C   Senior Physician Assistant   [facility]                                         Disclaimer: This note was generated with use of a verbal recognition program (DRAGON) and an attempt was made to check for errors. It is possible that there are still dictated errors within this office note. If so, please bring any significant errors to my attention for an addendum. All efforts were made to ensure that this office note is accurate.

## 2022-06-09 ENCOUNTER — TELEPHONE (OUTPATIENT)
Dept: ORTHOPEDIC SURGERY | Age: 67
End: 2022-06-09

## 2022-06-09 ENCOUNTER — HOSPITAL ENCOUNTER (OUTPATIENT)
Dept: GENERAL RADIOLOGY | Age: 67
Discharge: HOME | End: 2022-06-09
Payer: COMMERCIAL

## 2022-06-09 ENCOUNTER — OFFICE VISIT (OUTPATIENT)
Dept: ORTHOPEDIC SURGERY | Age: 67
End: 2022-06-09
Payer: COMMERCIAL

## 2022-06-09 DIAGNOSIS — M16.11 PRIMARY OSTEOARTHRITIS OF RIGHT HIP: Primary | ICD-10-CM

## 2022-06-09 DIAGNOSIS — M16.11 PRIMARY OSTEOARTHRITIS OF RIGHT HIP: ICD-10-CM

## 2022-06-09 PROCEDURE — 2500000003 HC RX 250 WO HCPCS

## 2022-06-09 PROCEDURE — 20610 DRAIN/INJ JOINT/BURSA W/O US: CPT

## 2022-06-09 PROCEDURE — 99999 PR OFFICE/OUTPT VISIT,PROCEDURE ONLY: CPT | Performed by: ORTHOPAEDIC SURGERY

## 2022-06-09 PROCEDURE — 77002 NEEDLE LOCALIZATION BY XRAY: CPT | Performed by: ORTHOPAEDIC SURGERY

## 2022-06-09 PROCEDURE — 77002 NEEDLE LOCALIZATION BY XRAY: CPT

## 2022-06-09 PROCEDURE — 6360000002 HC RX W HCPCS

## 2022-06-09 PROCEDURE — 20610 DRAIN/INJ JOINT/BURSA W/O US: CPT | Performed by: ORTHOPAEDIC SURGERY

## 2022-06-09 NOTE — TELEPHONE ENCOUNTER
General Question     Subject: Patient is wondering where to go, hospital or office appt notes say [facility]. Please advise.   Patient: Kera Anaya  Contact Number: [phone]

## 2022-08-04 ENCOUNTER — HOSPITAL ENCOUNTER (INPATIENT)
Age: 67
LOS: 1 days | Discharge: HOME | DRG: 309 | End: 2022-08-06
Attending: STUDENT IN AN ORGANIZED HEALTH CARE EDUCATION/TRAINING PROGRAM | Admitting: INTERNAL MEDICINE
Payer: COMMERCIAL

## 2022-08-04 DIAGNOSIS — R07.9 ACUTE CHEST PAIN: Primary | ICD-10-CM

## 2022-08-04 DIAGNOSIS — I95.2 HYPOTENSION DUE TO MEDICATION: ICD-10-CM

## 2022-08-04 DIAGNOSIS — N17.9 AKI (ACUTE KIDNEY INJURY) (HCC): ICD-10-CM

## 2022-08-04 PROCEDURE — 99285 EMERGENCY DEPT VISIT HI MDM: CPT

## 2022-08-04 PROCEDURE — 93005 ELECTROCARDIOGRAM TRACING: CPT

## 2022-08-05 ENCOUNTER — APPOINTMENT (OUTPATIENT)
Dept: GENERAL RADIOLOGY | Age: 67
DRG: 309 | End: 2022-08-05
Payer: COMMERCIAL

## 2022-08-05 PROBLEM — N17.9 ACUTE KIDNEY INJURY (HCC): Status: ACTIVE | Noted: 2022-08-05

## 2022-08-05 PROBLEM — I95.9 ARTERIAL HYPOTENSION: Status: ACTIVE | Noted: 2022-08-05

## 2022-08-05 PROBLEM — R07.9 CHEST PAIN: Status: ACTIVE | Noted: 2022-08-05

## 2022-08-05 LAB
ALBUMIN SERPL-MCNC: 3.6 G/DL (ref 3.4–5)
ANION GAP SERPL CALCULATED.3IONS-SCNC: 11 MMOL/L (ref 3–16)
ANION GAP SERPL CALCULATED.3IONS-SCNC: 13 MMOL/L (ref 3–16)
BASOPHILS ABSOLUTE: 0 K/UL (ref 0–0.2)
BASOPHILS RELATIVE PERCENT: 0.5 %
BUN BLDV-MCNC: 23 MG/DL (ref 7–20)
BUN BLDV-MCNC: 25 MG/DL (ref 7–20)
CALCIUM SERPL-MCNC: 8.4 MG/DL (ref 8.3–10.6)
CALCIUM SERPL-MCNC: 9 MG/DL (ref 8.3–10.6)
CHLORIDE BLD-SCNC: 101 MMOL/L (ref 99–110)
CHLORIDE BLD-SCNC: 102 MMOL/L (ref 99–110)
CO2: 27 MMOL/L (ref 21–32)
CO2: 29 MMOL/L (ref 21–32)
CREAT SERPL-MCNC: 1.3 MG/DL (ref 0.8–1.3)
CREAT SERPL-MCNC: 1.6 MG/DL (ref 0.8–1.3)
EKG ATRIAL RATE: 61 BPM
EKG DIAGNOSIS: NORMAL
EKG P AXIS: 26 DEGREES
EKG P-R INTERVAL: 200 MS
EKG Q-T INTERVAL: 388 MS
EKG QRS DURATION: 88 MS
EKG QTC CALCULATION (BAZETT): 390 MS
EKG R AXIS: 18 DEGREES
EKG T AXIS: 10 DEGREES
EKG VENTRICULAR RATE: 61 BPM
EOSINOPHILS ABSOLUTE: 0.2 K/UL (ref 0–0.6)
EOSINOPHILS RELATIVE PERCENT: 3.5 %
ESTIMATED AVERAGE GLUCOSE: 111.2 MG/DL
GFR AFRICAN AMERICAN: 52
GFR AFRICAN AMERICAN: >60
GFR NON-AFRICAN AMERICAN: 43
GFR NON-AFRICAN AMERICAN: 55
GLUCOSE BLD-MCNC: 156 MG/DL (ref 70–99)
GLUCOSE BLD-MCNC: 160 MG/DL (ref 70–99)
GLUCOSE BLD-MCNC: 166 MG/DL (ref 70–99)
GLUCOSE BLD-MCNC: 175 MG/DL (ref 70–99)
GLUCOSE BLD-MCNC: 194 MG/DL (ref 70–99)
GLUCOSE BLD-MCNC: 196 MG/DL (ref 70–99)
GLUCOSE BLD-MCNC: 64 MG/DL (ref 70–99)
HBA1C MFR BLD: 5.5 %
HCT VFR BLD CALC: 33 % (ref 40.5–52.5)
HEMOGLOBIN: 11.1 G/DL (ref 13.5–17.5)
LV EF: 84 %
LVEF MODALITY: NORMAL
LYMPHOCYTES ABSOLUTE: 1.6 K/UL (ref 1–5.1)
LYMPHOCYTES RELATIVE PERCENT: 35.9 %
MCH RBC QN AUTO: 33.6 PG (ref 26–34)
MCHC RBC AUTO-ENTMCNC: 33.7 G/DL (ref 31–36)
MCV RBC AUTO: 99.9 FL (ref 80–100)
MONOCYTES ABSOLUTE: 0.2 K/UL (ref 0–1.3)
MONOCYTES RELATIVE PERCENT: 5.3 %
NEUTROPHILS ABSOLUTE: 2.4 K/UL (ref 1.7–7.7)
NEUTROPHILS RELATIVE PERCENT: 54.8 %
PDW BLD-RTO: 13.6 % (ref 12.4–15.4)
PERFORMED ON: ABNORMAL
PHOSPHORUS: 3.5 MG/DL (ref 2.5–4.9)
PLATELET # BLD: 175 K/UL (ref 135–450)
PMV BLD AUTO: 8.8 FL (ref 5–10.5)
POTASSIUM REFLEX MAGNESIUM: 4.3 MMOL/L (ref 3.5–5.1)
POTASSIUM SERPL-SCNC: 3.7 MMOL/L (ref 3.5–5.1)
PRO-BNP: 28 PG/ML (ref 0–124)
RBC # BLD: 3.3 M/UL (ref 4.2–5.9)
SODIUM BLD-SCNC: 140 MMOL/L (ref 136–145)
SODIUM BLD-SCNC: 143 MMOL/L (ref 136–145)
TROPONIN: <0.01 NG/ML
TROPONIN: <0.01 NG/ML
WBC # BLD: 4.4 K/UL (ref 4–11)

## 2022-08-05 PROCEDURE — 83036 HEMOGLOBIN GLYCOSYLATED A1C: CPT

## 2022-08-05 PROCEDURE — 2580000003 HC RX 258: Performed by: INTERNAL MEDICINE

## 2022-08-05 PROCEDURE — 6360000002 HC RX W HCPCS: Performed by: INTERNAL MEDICINE

## 2022-08-05 PROCEDURE — 99223 1ST HOSP IP/OBS HIGH 75: CPT | Performed by: INTERNAL MEDICINE

## 2022-08-05 PROCEDURE — 6370000000 HC RX 637 (ALT 250 FOR IP): Performed by: INTERNAL MEDICINE

## 2022-08-05 PROCEDURE — 6370000000 HC RX 637 (ALT 250 FOR IP): Performed by: STUDENT IN AN ORGANIZED HEALTH CARE EDUCATION/TRAINING PROGRAM

## 2022-08-05 PROCEDURE — 2060000000 HC ICU INTERMEDIATE R&B

## 2022-08-05 PROCEDURE — 80048 BASIC METABOLIC PNL TOTAL CA: CPT

## 2022-08-05 PROCEDURE — A9502 TC99M TETROFOSMIN: HCPCS | Performed by: INTERNAL MEDICINE

## 2022-08-05 PROCEDURE — 78452 HT MUSCLE IMAGE SPECT MULT: CPT

## 2022-08-05 PROCEDURE — 84484 ASSAY OF TROPONIN QUANT: CPT

## 2022-08-05 PROCEDURE — 36415 COLL VENOUS BLD VENIPUNCTURE: CPT

## 2022-08-05 PROCEDURE — 3430000000 HC RX DIAGNOSTIC RADIOPHARMACEUTICAL: Performed by: INTERNAL MEDICINE

## 2022-08-05 PROCEDURE — 93010 ELECTROCARDIOGRAM REPORT: CPT | Performed by: INTERNAL MEDICINE

## 2022-08-05 PROCEDURE — 80069 RENAL FUNCTION PANEL: CPT

## 2022-08-05 PROCEDURE — 83880 ASSAY OF NATRIURETIC PEPTIDE: CPT

## 2022-08-05 PROCEDURE — 71046 X-RAY EXAM CHEST 2 VIEWS: CPT

## 2022-08-05 PROCEDURE — 94760 N-INVAS EAR/PLS OXIMETRY 1: CPT

## 2022-08-05 PROCEDURE — 93017 CV STRESS TEST TRACING ONLY: CPT

## 2022-08-05 PROCEDURE — 85025 COMPLETE CBC W/AUTO DIFF WBC: CPT

## 2022-08-05 PROCEDURE — 2580000003 HC RX 258: Performed by: STUDENT IN AN ORGANIZED HEALTH CARE EDUCATION/TRAINING PROGRAM

## 2022-08-05 RX ORDER — OXYCODONE HYDROCHLORIDE AND ACETAMINOPHEN 5; 325 MG/1; MG/1
1 TABLET ORAL EVERY 8 HOURS PRN
Status: DISCONTINUED | OUTPATIENT
Start: 2022-08-05 | End: 2022-08-06 | Stop reason: HOSPADM

## 2022-08-05 RX ORDER — ENOXAPARIN SODIUM 100 MG/ML
30 INJECTION SUBCUTANEOUS 2 TIMES DAILY
Status: DISCONTINUED | OUTPATIENT
Start: 2022-08-05 | End: 2022-08-06 | Stop reason: HOSPADM

## 2022-08-05 RX ORDER — SODIUM CHLORIDE 0.9 % (FLUSH) 0.9 %
5-40 SYRINGE (ML) INJECTION EVERY 12 HOURS SCHEDULED
Status: DISCONTINUED | OUTPATIENT
Start: 2022-08-05 | End: 2022-08-06 | Stop reason: HOSPADM

## 2022-08-05 RX ORDER — FLECAINIDE ACETATE 100 MG/1
50 TABLET ORAL EVERY 12 HOURS SCHEDULED
Status: DISCONTINUED | OUTPATIENT
Start: 2022-08-05 | End: 2022-08-06 | Stop reason: HOSPADM

## 2022-08-05 RX ORDER — NITROGLYCERIN 0.4 MG/1
0.4 TABLET SUBLINGUAL EVERY 5 MIN PRN
Status: DISCONTINUED | OUTPATIENT
Start: 2022-08-05 | End: 2022-08-06 | Stop reason: HOSPADM

## 2022-08-05 RX ORDER — INSULIN LISPRO 100 [IU]/ML
0-4 INJECTION, SOLUTION INTRAVENOUS; SUBCUTANEOUS NIGHTLY
Status: DISCONTINUED | OUTPATIENT
Start: 2022-08-05 | End: 2022-08-06 | Stop reason: HOSPADM

## 2022-08-05 RX ORDER — SODIUM CHLORIDE 0.9 % (FLUSH) 0.9 %
5-40 SYRINGE (ML) INJECTION PRN
Status: DISCONTINUED | OUTPATIENT
Start: 2022-08-05 | End: 2022-08-06 | Stop reason: HOSPADM

## 2022-08-05 RX ORDER — INSULIN GLARGINE 100 [IU]/ML
0.15 INJECTION, SOLUTION SUBCUTANEOUS NIGHTLY
Status: DISCONTINUED | OUTPATIENT
Start: 2022-08-05 | End: 2022-08-06 | Stop reason: HOSPADM

## 2022-08-05 RX ORDER — ZOLPIDEM TARTRATE 5 MG/1
5 TABLET ORAL NIGHTLY PRN
Status: DISCONTINUED | OUTPATIENT
Start: 2022-08-05 | End: 2022-08-06 | Stop reason: HOSPADM

## 2022-08-05 RX ORDER — POLYETHYLENE GLYCOL 3350 17 G/17G
17 POWDER, FOR SOLUTION ORAL DAILY PRN
Status: DISCONTINUED | OUTPATIENT
Start: 2022-08-05 | End: 2022-08-06 | Stop reason: HOSPADM

## 2022-08-05 RX ORDER — SODIUM CHLORIDE, SODIUM LACTATE, POTASSIUM CHLORIDE, AND CALCIUM CHLORIDE .6; .31; .03; .02 G/100ML; G/100ML; G/100ML; G/100ML
1000 INJECTION, SOLUTION INTRAVENOUS ONCE
Status: COMPLETED | OUTPATIENT
Start: 2022-08-05 | End: 2022-08-05

## 2022-08-05 RX ORDER — ONDANSETRON 4 MG/1
4 TABLET, ORALLY DISINTEGRATING ORAL EVERY 8 HOURS PRN
Status: DISCONTINUED | OUTPATIENT
Start: 2022-08-05 | End: 2022-08-06 | Stop reason: HOSPADM

## 2022-08-05 RX ORDER — METOPROLOL SUCCINATE 25 MG/1
25 TABLET, EXTENDED RELEASE ORAL DAILY
Status: DISCONTINUED | OUTPATIENT
Start: 2022-08-05 | End: 2022-08-05

## 2022-08-05 RX ORDER — DEXTROSE MONOHYDRATE 100 MG/ML
INJECTION, SOLUTION INTRAVENOUS CONTINUOUS PRN
Status: DISCONTINUED | OUTPATIENT
Start: 2022-08-05 | End: 2022-08-06 | Stop reason: HOSPADM

## 2022-08-05 RX ORDER — METHADONE HYDROCHLORIDE 5 MG/1
5 TABLET ORAL 2 TIMES DAILY
Status: DISCONTINUED | OUTPATIENT
Start: 2022-08-05 | End: 2022-08-05 | Stop reason: ALTCHOICE

## 2022-08-05 RX ORDER — INSULIN LISPRO 100 [IU]/ML
0-4 INJECTION, SOLUTION INTRAVENOUS; SUBCUTANEOUS
Status: DISCONTINUED | OUTPATIENT
Start: 2022-08-05 | End: 2022-08-06 | Stop reason: HOSPADM

## 2022-08-05 RX ORDER — AMLODIPINE BESYLATE 5 MG/1
2.5 TABLET ORAL 2 TIMES DAILY PRN
Status: DISCONTINUED | OUTPATIENT
Start: 2022-08-05 | End: 2022-08-06 | Stop reason: HOSPADM

## 2022-08-05 RX ORDER — ONDANSETRON 2 MG/ML
4 INJECTION INTRAMUSCULAR; INTRAVENOUS EVERY 6 HOURS PRN
Status: DISCONTINUED | OUTPATIENT
Start: 2022-08-05 | End: 2022-08-06 | Stop reason: HOSPADM

## 2022-08-05 RX ORDER — MULTIVIT WITH MINERALS/LUTEIN
1 TABLET ORAL DAILY
Status: DISCONTINUED | OUTPATIENT
Start: 2022-08-05 | End: 2022-08-05 | Stop reason: CLARIF

## 2022-08-05 RX ORDER — ACETAMINOPHEN 325 MG/1
650 TABLET ORAL EVERY 6 HOURS PRN
Status: DISCONTINUED | OUTPATIENT
Start: 2022-08-05 | End: 2022-08-06 | Stop reason: HOSPADM

## 2022-08-05 RX ORDER — SODIUM CHLORIDE 9 MG/ML
INJECTION, SOLUTION INTRAVENOUS PRN
Status: DISCONTINUED | OUTPATIENT
Start: 2022-08-05 | End: 2022-08-06 | Stop reason: HOSPADM

## 2022-08-05 RX ORDER — METOPROLOL SUCCINATE 25 MG/1
25 TABLET, EXTENDED RELEASE ORAL DAILY
Status: DISCONTINUED | OUTPATIENT
Start: 2022-08-05 | End: 2022-08-05 | Stop reason: SDUPTHER

## 2022-08-05 RX ORDER — SODIUM CHLORIDE 9 MG/ML
INJECTION, SOLUTION INTRAVENOUS CONTINUOUS
Status: DISCONTINUED | OUTPATIENT
Start: 2022-08-05 | End: 2022-08-06

## 2022-08-05 RX ORDER — INSULIN LISPRO 100 [IU]/ML
0.05 INJECTION, SOLUTION INTRAVENOUS; SUBCUTANEOUS
Status: DISCONTINUED | OUTPATIENT
Start: 2022-08-05 | End: 2022-08-06 | Stop reason: HOSPADM

## 2022-08-05 RX ORDER — TIZANIDINE 4 MG/1
4 TABLET ORAL 3 TIMES DAILY PRN
Status: DISCONTINUED | OUTPATIENT
Start: 2022-08-05 | End: 2022-08-06 | Stop reason: HOSPADM

## 2022-08-05 RX ORDER — ENOXAPARIN SODIUM 100 MG/ML
40 INJECTION SUBCUTANEOUS DAILY
Status: DISCONTINUED | OUTPATIENT
Start: 2022-08-05 | End: 2022-08-05 | Stop reason: DRUGHIGH

## 2022-08-05 RX ORDER — MULTIVITAMIN WITH IRON
1 TABLET ORAL DAILY
Status: DISCONTINUED | OUTPATIENT
Start: 2022-08-05 | End: 2022-08-06 | Stop reason: HOSPADM

## 2022-08-05 RX ORDER — METOPROLOL SUCCINATE 50 MG/1
50 TABLET, EXTENDED RELEASE ORAL DAILY
Status: DISCONTINUED | OUTPATIENT
Start: 2022-08-05 | End: 2022-08-06 | Stop reason: HOSPADM

## 2022-08-05 RX ORDER — LORAZEPAM 0.5 MG/1
0.5 TABLET ORAL EVERY 4 HOURS PRN
Status: DISCONTINUED | OUTPATIENT
Start: 2022-08-05 | End: 2022-08-06 | Stop reason: HOSPADM

## 2022-08-05 RX ORDER — ACETAMINOPHEN 650 MG/1
650 SUPPOSITORY RECTAL EVERY 6 HOURS PRN
Status: DISCONTINUED | OUTPATIENT
Start: 2022-08-05 | End: 2022-08-06 | Stop reason: HOSPADM

## 2022-08-05 RX ORDER — ATORVASTATIN CALCIUM 20 MG/1
20 TABLET, FILM COATED ORAL NIGHTLY
Status: DISCONTINUED | OUTPATIENT
Start: 2022-08-05 | End: 2022-08-06 | Stop reason: HOSPADM

## 2022-08-05 RX ORDER — PREGABALIN 75 MG/1
75 CAPSULE ORAL 3 TIMES DAILY
Status: DISCONTINUED | OUTPATIENT
Start: 2022-08-05 | End: 2022-08-06 | Stop reason: HOSPADM

## 2022-08-05 RX ORDER — ASPIRIN 81 MG/1
324 TABLET, CHEWABLE ORAL ONCE
Status: COMPLETED | OUTPATIENT
Start: 2022-08-05 | End: 2022-08-05

## 2022-08-05 RX ORDER — ASPIRIN 81 MG/1
81 TABLET ORAL DAILY
Status: DISCONTINUED | OUTPATIENT
Start: 2022-08-05 | End: 2022-08-05 | Stop reason: SDUPTHER

## 2022-08-05 RX ORDER — FAMOTIDINE 20 MG/1
20 TABLET, FILM COATED ORAL 2 TIMES DAILY
Status: DISCONTINUED | OUTPATIENT
Start: 2022-08-05 | End: 2022-08-06 | Stop reason: HOSPADM

## 2022-08-05 RX ORDER — ASPIRIN 81 MG/1
81 TABLET, CHEWABLE ORAL DAILY
Status: DISCONTINUED | OUTPATIENT
Start: 2022-08-06 | End: 2022-08-06 | Stop reason: HOSPADM

## 2022-08-05 RX ADMIN — ENOXAPARIN SODIUM 30 MG: 100 INJECTION SUBCUTANEOUS at 09:05

## 2022-08-05 RX ADMIN — PREGABALIN 75 MG: 75 CAPSULE ORAL at 15:07

## 2022-08-05 RX ADMIN — THERA TABS 1 TABLET: TAB at 09:05

## 2022-08-05 RX ADMIN — TETROFOSMIN 30 MILLICURIE: 1.38 INJECTION, POWDER, LYOPHILIZED, FOR SOLUTION INTRAVENOUS at 10:46

## 2022-08-05 RX ADMIN — FAMOTIDINE 20 MG: 20 TABLET, FILM COATED ORAL at 09:05

## 2022-08-05 RX ADMIN — FLECAINIDE ACETATE 50 MG: 100 TABLET ORAL at 17:55

## 2022-08-05 RX ADMIN — SODIUM CHLORIDE: 9 INJECTION, SOLUTION INTRAVENOUS at 15:10

## 2022-08-05 RX ADMIN — METOPROLOL SUCCINATE 50 MG: 50 TABLET, EXTENDED RELEASE ORAL at 12:08

## 2022-08-05 RX ADMIN — SODIUM CHLORIDE, POTASSIUM CHLORIDE, SODIUM LACTATE AND CALCIUM CHLORIDE 1000 ML: 600; 310; 30; 20 INJECTION, SOLUTION INTRAVENOUS at 00:16

## 2022-08-05 RX ADMIN — TETROFOSMIN 10 MILLICURIE: 1.38 INJECTION, POWDER, LYOPHILIZED, FOR SOLUTION INTRAVENOUS at 09:32

## 2022-08-05 RX ADMIN — FAMOTIDINE 20 MG: 20 TABLET, FILM COATED ORAL at 20:48

## 2022-08-05 RX ADMIN — ASPIRIN 81 MG 324 MG: 81 TABLET ORAL at 02:11

## 2022-08-05 RX ADMIN — ENOXAPARIN SODIUM 30 MG: 100 INJECTION SUBCUTANEOUS at 20:48

## 2022-08-05 RX ADMIN — INSULIN GLARGINE 17 UNITS: 100 INJECTION, SOLUTION SUBCUTANEOUS at 20:47

## 2022-08-05 RX ADMIN — OXYCODONE AND ACETAMINOPHEN 1 TABLET: 5; 325 TABLET ORAL at 03:16

## 2022-08-05 RX ADMIN — SODIUM CHLORIDE: 9 INJECTION, SOLUTION INTRAVENOUS at 03:11

## 2022-08-05 RX ADMIN — INSULIN LISPRO 6 UNITS: 100 INJECTION, SOLUTION INTRAVENOUS; SUBCUTANEOUS at 18:46

## 2022-08-05 RX ADMIN — ATORVASTATIN CALCIUM 20 MG: 20 TABLET, FILM COATED ORAL at 20:52

## 2022-08-05 RX ADMIN — OXYCODONE AND ACETAMINOPHEN 1 TABLET: 5; 325 TABLET ORAL at 16:52

## 2022-08-05 RX ADMIN — Medication 10 ML: at 20:53

## 2022-08-05 RX ADMIN — INSULIN LISPRO 6 UNITS: 100 INJECTION, SOLUTION INTRAVENOUS; SUBCUTANEOUS at 13:21

## 2022-08-05 RX ADMIN — ZOLPIDEM TARTRATE 5 MG: 5 TABLET ORAL at 23:35

## 2022-08-05 RX ADMIN — REGADENOSON 0.4 MG: 0.08 INJECTION, SOLUTION INTRAVENOUS at 10:42

## 2022-08-05 RX ADMIN — SODIUM CHLORIDE: 9 INJECTION, SOLUTION INTRAVENOUS at 23:39

## 2022-08-05 RX ADMIN — TIZANIDINE 4 MG: 4 TABLET ORAL at 09:43

## 2022-08-05 RX ADMIN — PREGABALIN 75 MG: 75 CAPSULE ORAL at 12:08

## 2022-08-05 RX ADMIN — PREGABALIN 75 MG: 75 CAPSULE ORAL at 20:48

## 2022-08-05 ASSESSMENT — ENCOUNTER SYMPTOMS
ABDOMINAL PAIN: 0
DIARRHEA: 0
VOMITING: 0
EYE PAIN: 0
BACK PAIN: 0
SORE THROAT: 0
NAUSEA: 0
COUGH: 0
SHORTNESS OF BREATH: 1

## 2022-08-05 ASSESSMENT — PAIN SCALES - GENERAL
PAINLEVEL_OUTOF10: 7
PAINLEVEL_OUTOF10: 7
PAINLEVEL_OUTOF10: 0
PAINLEVEL_OUTOF10: 0

## 2022-08-05 ASSESSMENT — PAIN DESCRIPTION - DESCRIPTORS
DESCRIPTORS: ACHING
DESCRIPTORS: ACHING

## 2022-08-05 ASSESSMENT — PAIN DESCRIPTION - ORIENTATION
ORIENTATION: LOWER
ORIENTATION: MID;LOWER

## 2022-08-05 ASSESSMENT — PAIN DESCRIPTION - LOCATION
LOCATION: BACK
LOCATION: BACK

## 2022-08-05 NOTE — ED NOTES
Took pt blood Sugar and it was 175, I notify nurse Erika, pt dose not need a repeat at this time.  Nurse is at bedside      Carl Morris  08/04/22 8612

## 2022-08-05 NOTE — H&P
[address]                              HISTORY AND PHYSICAL    PATIENT NAME: Yuli Telles                    :        1955  MED REC NO:   [id number]                          ROOM:       [id number]  ACCOUNT NO:   [de-identified]                           ADMIT DATE: 2022  PROVIDER:     Sydney Ni MD    CHIEF COMPLAINT:  Chest pain. HISTORY OF PRESENT ILLNESS:  The patient is a generally healthy  73-year-old retired, disabled, [place] American male who lives in his  own home with his wife and has other family in town. Yesterday evening  after dinner, the patient had the onset of anterior chest pain that  seemed to be affected by motion, associated with fatigue. Because of  these symptoms, the patient had his wife check his blood pressure. His  blood pressure was found to be low. Because of this constellation of  symptoms and the patient's multiple cardiac risk factors, the patient  felt he should come to the emergency room. Consequently, he was brought  to the emergency room for further evaluation. Emergency room evaluation  revealed low blood pressure which improved with saline infusion. Chest  x-ray suggested some possible bronchiolitis in the left lower lobe  although the patient was devoid of respiratory symptoms. Electrocardiogram showed PVCs. No other acute changes. Troponin was  normal.  Laboratory evaluation in the emergency room showed some  elevation of the BUN to 23, creatinine to 1.6 suggestive of acute kidney  injury, possibly due to dehydration. Random glucose was 166. Hemoglobin was 11.1, hematocrit 33, white blood cell count was normal,  platelet count was normal.  In view of the patient's multiple risk  factors, admission was felt necessary for further cardiac evaluation. The patient and family consented to admission.   I was contacted,  admitted the patient to ICU on telemetry. PAST MEDICAL HISTORY:  The patient has extensive past medical history  including many years of type 2 diabetes mellitus, many years of obesity,  many years of hypertension, current degenerative joint disease of the  right hip, status post lumbar laminectomy and fusion, chronic low back  pain syndrome managed by Pain Management, previous episode of  supraventricular tachycardia, right lateral epicondylitis, left total  hip replacement, left total knee replacement, right total knee  replacement, carpal tunnel syndrome of the left wrist.    ALLERGIES:  The patient is known to be allergic or intolerant to  PROCHLORPERAZINE and some GI upset with PREDNISONE. FAMILY HISTORY:  Characterized by coronary artery disease,  osteoarthritis, type 2 diabetes mellitus, hypothyroidism, hypertension. SOCIAL HISTORY:  The patient is , lives in his own home with his  wife. He is retired and disabled. He does not smoke or consume  alcohol. No illicit drugs. REVIEW OF SYSTEMS:  The patient denies fever or chills. Denies cough or  unusual dyspnea on exertion. Denies any signs of upper or lower  gastrointestinal bleeding. No genitourinary bleeding. No syncope. PHYSICAL EXAMINATION:  GENERAL:  Following admission, the patient is a well-developed,  overweight  male lying comfortably in bed in no acute  distress. He denies chest pain at the present time. VITAL SIGNS:  Temperature 98.4, respirations 17, pulse 63, pulse ox on  room air 95%. HEENT:  Normal.  Thyroid was normal.  Carotid pulses were 2+ without  bruits. LUNGS:  Clear to auscultation and percussion. CARDIAC:  Regular rate and rhythm with an occasional extra beat. Heart  sounds were normal.  ABDOMEN:  Rotund. Bowel sounds were present. There is no abdominal  mass, organomegaly or tenderness. No bruits were heard. /RECTAL:  Exams were deferred. EXTREMITIES:  Bilateral total knee replacements.   There were some stasis  dermatitis changes of both lower legs. There were 2+ bilateral dorsalis  pedis pulses. There was decreased range of motion of the lumbar spine  also associated with some pain. NEUROLOGICAL:  Examination with the patient lying in bed was normal  except for some possible decreased sensation to touch in both feet. IMPRESSION:  1. Chest pain of unclear etiology. 2.  Acute kidney injury, probably secondary to dehydration. 3.  Hypotension, probably secondary to antihypertensive medication and  dehydration. 4.  Type 2 diabetes mellitus, controlled. 5.  Multiple prior orthopedic surgeries. 6. Anemia of uncertain etiology. PLAN:  Gentle rehydration. Hold his antihypertensives. Hold his oral  diabetic agents. Insulin coverage by sliding scale and basal.   Prophylactic anticoagulation. Cardiology consultation. Nephrology  consultation. I had already ordered a stress test.  Plan was discussed  with the patient. He was in agreement. Anticipated length of stay is  greater than two midnights.         Marla Rodriguez MD    D: 08/05/2022 9:10:54       T: 08/05/2022 9:15:41     [email or web address]  Job#: [id number]     Doc#: [id number]    CC:

## 2022-08-05 NOTE — PROGRESS NOTES
Clinical Pharmacy Note  Renal Dose Adjustment    Delmar Kent is receiving enoxaparin. This medication is renally eliminated. Based on the patient's Estimated Creatinine Clearance: 53 mL/min (A) (based on SCr of 1.6 mg/dL (H)). and weight, the dose has been adjusted to enoxaparin 30mg BID per protocol. Pharmacy will continue to monitor and adjust dose as needed for changes in renal function.        Yash Hernandez, PharmD  8/5/2022 2:56 AM

## 2022-08-05 NOTE — ED PROVIDER NOTES
[address] [facility]      Pt Name: Edyta Higgins  MRN: [id number]  [place] 1955  Date of evaluation: 8/4/2022  Provider: Lucio Harvey MD    CHIEF COMPLAINT       Chief Complaint   Patient presents with    Chest Pain    Fatigue     Chest pain, generalized weakness    HISTORY OF PRESENT ILLNESS   (Location/Symptom, Timing/Onset,Context/Setting, Quality, Duration, Modifying Factors, Severity)  Note limiting factors. Edyta Higgins is a 69 y.o. male who presents to the ED with a chief complaint of chest pain for the past 2 hours prior to arrival.  Onset gradual, initially worsened, described as pressure localized to the left side of his chest, radiating up into the left shoulder, associated with shortness of breath at the time, feelings of generalized weakness. Denies nausea, vomiting, dizziness, diaphoresis, focal weakness. Symptoms not otherwise alleviated or exacerbated by other factors. On multiple medications for his blood pressure including a diuretic. Chest pain has improved since arrival to the emergency department, states no longer with any sensation of pressure in his chest.      NursingNotes were reviewed. REVIEW OF SYSTEMS    (2-9 systems for level 4, 10 or more for level 5)     Review of Systems   Constitutional:  Positive for fatigue. Negative for chills and fever. HENT:  Negative for congestion and sore throat. Eyes:  Negative for pain and visual disturbance. Respiratory:  Positive for shortness of breath. Negative for cough. Cardiovascular:  Positive for chest pain. Negative for palpitations and leg swelling. Gastrointestinal:  Negative for abdominal pain, diarrhea, nausea and vomiting. Genitourinary:  Negative for dysuria and frequency. Musculoskeletal:  Negative for back pain and neck pain. Skin:  Negative for rash and wound. Neurological:  Positive for weakness (Generalized).  Negative for dizziness and light-headedness. PAST MEDICAL HISTORY     Past Medical History:   Diagnosis Date    Arthritis     Chronic back pain     Diabetes mellitus ([address]     Fracture of nasal bone     Headache     Hearing loss     High blood pressure     Hyperlipidemia     Wears partial dentures          SURGICALHISTORY       Past Surgical History:   Procedure Laterality Date    APPENDECTOMY      BACK SURGERY  10/2007   [address]    BRONCHOSCOPY      CARPAL TUNNEL RELEASE Bilateral     COLONOSCOPY      ESOPHAGUS SURGERY      HIP ARTHROPLASTY Left 04/04/2017    HIP SURGERY  04/2008    bursa removed    JOINT REPLACEMENT  2009, 2006, 2003, 1997    Left knee replacement    KNEE SURGERY Left     [id number] scopes    SHOULDER ARTHROSCOPY Left 7/26/2019    LEFT SHOULDER ARTHROSCOPY, SUBACROMIAL DECOMPRESSION, ROTATOR CUFF REPAIR performed by Scott Howe MD at [address] [facility],[address]       Previous Medications    AMITRIPTYLINE (ELAVIL) 75 MG TABLET    Take 75 mg by mouth nightly as needed     ASPIRIN 81 MG EC TABLET    Take 81 mg by mouth    ATORVASTATIN (LIPITOR) 20 MG TABLET    Take 20 mg by mouth nightly     AUGMENTED BETAMETHASONE DIPROPIONATE (DIPROLENE-AF) 0.05 % CREAM    Apply thin layer topically to L ankle every third day    BACLOFEN (LIORESAL) 10 MG TABLET    Take 10 mg by mouth as needed     BUTALBITAL-APAP-CAFFEINE (FIORICET) -40 MG CAPS PER CAPSULE    Take 1 capsule by mouth every 6 hours as needed for Headaches    GLIPIZIDE (GLUCOTROL) 5 MG TABLET        LISINOPRIL-HYDROCHLOROTHIAZIDE (PRINZIDE;ZESTORETIC) 20-25 MG PER TABLET    Take 1 Tab by mouth daily. METFORMIN (GLUCOPHAGE) 500 MG TABLET    Take 500 mg by mouth 2 times daily (with meals) Takes only as needed    METHADONE (DOLOPHINE) 5 MG TABLET    Take 5 mg by mouth 2 times daily.      METOPROLOL (TOPROL-XL) 25 MG XL TABLET    Take 25 mg by mouth daily     MULTIPLE VITAMIN PO    Take by mouth    MULTIPLE VITAMINS-MINERALS (CENTRUM SILVER PO)    Take  by mouth. NABUMETONE (RELAFEN) 750 MG TABLET        ONETOUCH ULTRA STRIP        OXYCODONE-ACETAMINOPHEN (PERCOCET) 5-325 MG PER TABLET    Take 1 tablet by mouth every 8 hours as needed for Pain. PREGABALIN (LYRICA) 75 MG CAPSULE        TIZANIDINE (ZANAFLEX) 4 MG TABLET    Take 4 mg by mouth 3 times daily    ZOLPIDEM (AMBIEN) 5 MG TABLET    Take 5 mg by mouth nightly as needed        ALLERGIES     Prochlorperazine and Prednisone    FAMILY HISTORY       Family History   Problem Relation Age of Onset    Heart Disease Mother     High Blood Pressure Mother     Diabetes Mother     Heart Disease Father     High Blood Pressure Father     Diabetes Father           SOCIAL HISTORY       Social History     Socioeconomic History    Marital status:    Tobacco Use    Smoking status: Never    Smokeless tobacco: Never   Vaping Use    Vaping Use: Never used   Substance and Sexual Activity    Alcohol use: No    Drug use: No    Sexual activity: Not Currently       SCREENINGS    Aron Coma Scale  Eye Opening: Spontaneous  Best Verbal Response: Oriented  Best Motor Response: Obeys commands  Aron Coma Scale Score: 15        PHYSICAL EXAM    (up to 7 for level 4, 8 or more for level 5)     ED Triage Vitals   BP Temp Temp src Heart Rate Resp SpO2 Height Weight   08/04/22 2351 08/04/22 2356 -- 08/04/22 2351 08/04/22 2351 08/04/22 2351 08/04/22 2356 08/04/22 2356   88/60 97.4 °F (36.3 °C)  58 14 92 % 5' 8\" (1.727 m) 251 lb 11.2 oz (114.2 kg)       General: Alert and oriented appropriately for age, No acute distress. Eye: Normal conjunctiva. Sclera anicteric. HENT: Oral mucosa is moist.  Normocephalic, atraumatic  Neck: Supple, no JVD. Respiratory: Respirations even and non-labored. Clear to auscultation bilaterally  Cardiovascular: Normal rate, Regular rhythm. Intact peripheral pulses. No edema. Gastrointestinal: Soft, Non-tender, Non-distended. : deferred.   Musculoskeletal: No swelling. Integumentary: Warm, Dry. Neurologic: Alert and appropriate for age. No focal deficits. Psychiatric: Cooperative. DIAGNOSTIC RESULTS     EKG: All EKG's are interpreted by the Emergency Department Physician who either signs or Co-signsthis chart in the absence of a cardiologist.    The Ekg interpreted by me shows  Rhythm normal sinus rhythm with PACs in bigeminy  Rate of 61 bpm  Axis is normal  Intervals and durations normal  ST Segments: Normal  Compared to prior EKG dated May 2, 2019, patient no longer with PVCs. RADIOLOGY:   Non-plain filmimages such as CT, Ultrasound and MRI are read by the radiologist. Plain radiographic images are visualized and preliminarily interpreted by the emergency physician with the below findings:      Interpretation per the Radiologist below, if available at the time ofthis note:    XR CHEST (2 VW)   Final Result   Mildly prominent peribronchial markings seen at the right lung base, which   could be related to bronchiolitis. No consolidation or pleural effusion. ED BEDSIDE ULTRASOUND:   Performed by ED Physician - none    LABS:  Labs Reviewed   CBC WITH AUTO DIFFERENTIAL - Abnormal; Notable for the following components:       Result Value    RBC 3.30 (*)     Hemoglobin 11.1 (*)     Hematocrit 33.0 (*)     All other components within normal limits   BASIC METABOLIC PANEL W/ REFLEX TO MG FOR LOW K - Abnormal; Notable for the following components:    Glucose 166 (*)     BUN 23 (*)     Creatinine 1.6 (*)     GFR Non- 43 (*)     GFR  52 (*)     All other components within normal limits   POCT GLUCOSE - Abnormal; Notable for the following components:    POC Glucose 175 (*)     All other components within normal limits   TROPONIN   BRAIN NATRIURETIC PEPTIDE       All other labs were within normal range or not returned as of this dictation.       EMERGENCY DEPARTMENT COURSE and DIFFERENTIAL DIAGNOSIS/MDM:   Vitals:    Vitals: 22 0031 22 0041 22 0051 22 0101   BP: (!) 97/53 103/61  108/84   Pulse: 62 63 62 61   Resp: 14 14 15 15   Temp:       SpO2: 96% 95% 97% 96%   Weight:       Height:             Medical decision makin-year-old male who presents with chest pain that is concerning for angina, at least moderately so, he is initially hypotensive on arrival, concern for possible overdiuresis given the antihypertensive that he is on versus decreased p.o. intake versus primary cardiogenic process. EKG however shows no acute ischemic changes. Given his age, his risk factors, description of his pain, patient is moderate risk for ACS given his HEART score, requiring admission for further work-up. Will give aspirin. IVFs given for HILLARY. Medications   lactated ringers bolus (0 mLs IntraVENous Stopped 22)   aspirin chewable tablet 324 mg (324 mg Oral Given 22)         Is this patient to be included in the SEP-1 Core Measure due to severe sepsis or septic shock? No   Exclusion criteria - the patient is NOT to be included for SEP-1 Core Measure due to: Infection is not suspected     CONSULTS:  IP CONSULT TO PRIMARY CARE PROVIDER      FINAL IMPRESSION      1. Acute chest pain    2. HILLARY (acute kidney injury) ([facility] [address]    3.  Hypotension due to medication          DISPOSITION/PLAN   DISPOSITION Decision To Admit 2022 01:39:02 AM    (Please note that portions of this note were completed with a voice recognition program.Efforts were made to edit the dictations but occasionally words are mis-transcribed.)    Dai Medellin MD (electronically signed)  Attending Emergency Physician          Dai Medellin MD  22 Jabari Hudson MD  22 0060

## 2022-08-05 NOTE — CONSULTS
Nephrology ([facility]) Consult Note    Kalia Aguirre is a 69 y.o. male whom we were asked to see for HILLARY. The patient presents with chest discomfort x 2 hours, initially on left chest, radiating to left shoulder and associated with fatigue. He is on lisinopril, HCTZ at home. He was noted to be hypotensive in the ER. Past Medical History:  HTN  HLP  DM2  OA      Review of System:  Otherwise unremarkable    Allergies:  Prochlorperazine and Prednisone    Medications:  Current medications reviewed. Social History:  Family Medical History:  Negative for kidney disase    Physical Exam:  Blood pressure 103/62, pulse 63, temperature 97.3 °F (36.3 °C), temperature source Oral, resp. rate 17, height 5' 8\" (1.727 m), weight 247 lb 12.8 oz (112.4 kg), SpO2 95 %.     General:  NAD, A+Ox3, ill-appearing, obese body habitus  HEENT:  PERRL, EOMI  Neck:  Supple, normal range of movement, thyroid unremarkable  Chest:  CTAB, good respiratory effort, good air movement  CV:  RRR, no murmurs or rubs, no carotid bruit, no abdominal bruits  Abdomen:  NTND, soft, +BS, no hepatosplenomegaly  Extremities:  No peripheral edema  Neurological:  Moving all four extremities, CN II-XII grossly intact  Lymphatics:  No palpable lymph nodes  Skin:  No rash, no jaundice  Psychiatric:  Normal insight and judgement, good recall    Laboratory Studies:  Lab Results   Component Value Date/Time     08/05/2022 03:38 AM    K 3.7 08/05/2022 03:38 AM    K 4.3 08/05/2022 12:00 AM     08/05/2022 03:38 AM    CO2 27 08/05/2022 03:38 AM    BUN 25 (H) 08/05/2022 03:38 AM    CREATININE 1.3 08/05/2022 03:38 AM    CALCIUM 8.4 08/05/2022 03:38 AM    PHOS 3.5 08/05/2022 03:38 AM    MG 1.90 06/06/2019 03:27 PM     Lab Results   Component Value Date/Time    WBC 4.4 08/05/2022 12:00 AM    HGB 11.1 (L) 08/05/2022 12:00 AM    HCT 33.0 (L) 08/05/2022 12:00 AM     08/05/2022 12:00 AM       Assessment/Plan:  Reviewed old records and labs.      1) HILLARY   - baseline 12/31/21 Cr 1.0 > 1.6, stage 1 of 3 HILLARY   - ddx:  pre-renal vs. ?   - renal function has improved   - on empiric IVF   - monitor    2) CV   - unclear why BP is marginal   - not sure this is dehydration as he was indoors and had access to fluids, and despite IVF here, his  for the last reading   - off of lisinopril and HCTZ currently    3) chest discomfort   - cardiac enzymes negative

## 2022-08-05 NOTE — PROGRESS NOTES
Medication Reconciliation    List of medications patient is currently taking is complete. Source of information: 1.  Conversation with patient at bedside                                      2. EPIC records      Allergies  Prochlorperazine and Prednisone     Graeme Urias [facility], PharmD, BCPS  8/5/2022 9:38 AM

## 2022-08-05 NOTE — CONSULTS
[facility]  Cardiology Consult Note        CC:      Chest pain             HPI:   This is a 69 y.o. male came to the ER because of chest pain and hypotension. The patient was at home and may have had a meal when he had discomfort in his chest lasted for short period of time he described this as a pressure. There was no associated nausea vomiting radiation. His wife took his blood pressure and it was very low and that is when she called the squad. The squad brought him in and the blood pressure was in the 80s. In the ER he was found to be in A. fib. I personally looked at the monitor strips in the hospital and did confirm controlled A. fib.   The patient is not aware of them      Past Medical History:   Diagnosis Date    Arthritis     Chronic back pain     Diabetes mellitus ([address]     Fracture of nasal bone     Headache     Hearing loss     High blood pressure     Hyperlipidemia     Wears partial dentures       Past Surgical History:   Procedure Laterality Date    APPENDECTOMY      BACK SURGERY  10/2007   1979  1980    BRONCHOSCOPY      CARPAL TUNNEL RELEASE Bilateral     COLONOSCOPY      ESOPHAGUS SURGERY      HIP ARTHROPLASTY Left 04/04/2017    HIP SURGERY  04/2008    bursa removed    JOINT REPLACEMENT  2009, 2006, 2003, 1997    Left knee replacement    KNEE SURGERY Left     [id number] scopes    SHOULDER ARTHROSCOPY Left 7/26/2019    LEFT SHOULDER ARTHROSCOPY, SUBACROMIAL DECOMPRESSION, ROTATOR CUFF REPAIR performed by Davin Triana MD at [facility]        Family History   Problem Relation Age of Onset    Heart Disease Mother     High Blood Pressure Mother     Diabetes Mother     Heart Disease Father     High Blood Pressure Father     Diabetes Father       Social History     Tobacco Use    Smoking status: Never    Smokeless tobacco: Never   Vaping Use    Vaping Use: Never used   Substance Use Topics    Alcohol use: No    Drug use: No      Allergies   Allergen Reactions Prochlorperazine Swelling     Swelling of tongue    Prednisone      Makes him ill. atorvastatin  20 mg Oral Nightly    methadone  5 mg Oral BID    insulin glargine  0.15 Units/kg SubCUTAneous Nightly    insulin lispro  0.05 Units/kg SubCUTAneous TID WC    insulin lispro  0-4 Units SubCUTAneous TID WC    insulin lispro  0-4 Units SubCUTAneous Nightly    sodium chloride flush  5-40 mL IntraVENous 2 times per day    [START ON 8/6/2022] aspirin  81 mg Oral Daily    famotidine  20 mg Oral BID    metoprolol succinate  25 mg Oral Daily    enoxaparin  30 mg SubCUTAneous BID    multivitamin  1 tablet Oral Daily       Review of Systems -   Constitutional: Negative for weight gain/loss; malaise, fever  Respiratory: Negative for Asthma;  cough and hemoptysis  Cardiovascular: Negative for palpitations,dizziness   Gastrointestinal: Negative for abd.pain; constipation/diarrhea;    Genitourinary: Negative for stones; hematuria; frequency hesitancy  Integumentt: Negative for rash or pruritis  Hematologic/lymphatic: Negative for blood dyscrasia; leukemia/lymphoma  Musculoskeletal: Negative for Connective tissue disease  Neurological:  Negative for Seizure   Behavioral/Psych:Negative for Bipolar disorder, Schizophrenia; Dementia  Endocrine: negative for thyroid, parathyroid disease      Intake/Output Summary (Last 24 hours) at 8/5/2022 0839  Last data filed at 8/5/2022 0817  Gross per 24 hour   Intake --   Output 450 ml   Net -450 ml       Physical Examination:    /75   Pulse 67   Temp 98.4 °F (36.9 °C) (Oral)   Resp 17   Ht 5' 8\" (1.727 m)   Wt 247 lb 12.8 oz (112.4 kg)   SpO2 95%   BMI 37.68 kg/m²    HEENT:  Face: Atraumatic, Conjunctiva: Pink; non icteric,  Mucous Memb:  Moist, No thyromegaly or Lymphadenopathy  Respiratory:  Resp Assessment: normal, Resp Auscultation: clear   Cardiovascular: Auscultation: nl S1 & S2, Palpation:  Nl PMI;  No heaves or thrills, JVP:  normal  Abdomen: Soft, non-tender, Normal bowel sounds,  No organomegaly  Extremities: No Cyanosis or Clubbing; Edema none  Neurological: Oriented to time, place, and person, Non-anxious  Psychiatric: Normal mood and affect  Skin: Warm and dry,  No rash seen      Current Facility-Administered Medications: amitriptyline (ELAVIL) tablet 75 mg, 75 mg, Oral, Nightly PRN  atorvastatin (LIPITOR) tablet 20 mg, 20 mg, Oral, Nightly  methadone (DOLOPHINE) tablet 5 mg, 5 mg, Oral, BID  oxyCODONE-acetaminophen (PERCOCET) 5-325 MG per tablet 1 tablet, 1 tablet, Oral, Q8H PRN  zolpidem (AMBIEN) tablet 5 mg, 5 mg, Oral, Nightly PRN  nitroGLYCERIN (NITROSTAT) SL tablet 0.4 mg, 0.4 mg, SubLINGual, Q5 Min PRN  0.9 % sodium chloride infusion, , IntraVENous, Continuous  glucose chewable tablet 16 g, 4 tablet, Oral, PRN  dextrose bolus 10% 125 mL, 125 mL, IntraVENous, PRN **OR** dextrose bolus 10% 250 mL, 250 mL, IntraVENous, PRN  glucagon (rDNA) injection 1 mg, 1 mg, SubCUTAneous, PRN  dextrose 10 % infusion, , IntraVENous, Continuous PRN  insulin glargine (LANTUS) injection vial 17 Units, 0.15 Units/kg, SubCUTAneous, Nightly  insulin lispro (HUMALOG) injection vial 6 Units, 0.05 Units/kg, SubCUTAneous, TID WC  insulin lispro (HUMALOG) injection vial 0-4 Units, 0-4 Units, SubCUTAneous, TID WC  insulin lispro (HUMALOG) injection vial 0-4 Units, 0-4 Units, SubCUTAneous, Nightly  sodium chloride flush 0.9 % injection 5-40 mL, 5-40 mL, IntraVENous, 2 times per day  sodium chloride flush 0.9 % injection 5-40 mL, 5-40 mL, IntraVENous, PRN  0.9 % sodium chloride infusion, , IntraVENous, PRN  ondansetron (ZOFRAN-ODT) disintegrating tablet 4 mg, 4 mg, Oral, Q8H PRN **OR** ondansetron (ZOFRAN) injection 4 mg, 4 mg, IntraVENous, Q6H PRN  acetaminophen (TYLENOL) tablet 650 mg, 650 mg, Oral, Q6H PRN **OR** acetaminophen (TYLENOL) suppository 650 mg, 650 mg, Rectal, Q6H PRN  polyethylene glycol (GLYCOLAX) packet 17 g, 17 g, Oral, Daily PRN  [START ON 8/6/2022] aspirin chewable tablet 81 mg, 81 mg, Oral, Daily  famotidine (PEPCID) tablet 20 mg, 20 mg, Oral, BID  nitroGLYCERIN (NITROSTAT) SL tablet 0.4 mg, 0.4 mg, SubLINGual, Q5 Min PRN  metoprolol succinate (TOPROL XL) extended release tablet 25 mg, 25 mg, Oral, Daily  LORazepam (ATIVAN) tablet 0.5 mg, 0.5 mg, Oral, Q4H PRN  enoxaparin Sodium (LOVENOX) injection 30 mg, 30 mg, SubCUTAneous, BID  multivitamin 1 tablet, 1 tablet, Oral, Daily      Labs:   Recent Labs     08/05/22  0000   WBC 4.4   HGB 11.1*   HCT 33.0*        Recent Labs     08/05/22  0000 08/05/22  0338    140   K 4.3 3.7   CO2 29 27   BUN 23* 25*   CREATININE 1.6* 1.3   GLUCOSE 166* 64*     No results for input(s): BNP in the last 72 hours. No results for input(s): PROTIME, INR in the last 72 hours. No results for input(s): APTT in the last 72 hours. Recent Labs     08/05/22  0000 08/05/22  0338   TROPONINI <0.01 <0.01     No results found for: HDL, LDLDIRECT, LDLCALC, TRIG  Recent Labs     08/05/22  0338   LABALBU 3.6         EKG:   Sinus rhythm with Premature atrial complexes in a pattern of bigeminyOtherwise normal ECGWhen compared with ECG of 02-MAY-2019 20:21,Premature ventricular complexes are no longer PresentPremature atrial complexes are now PresentConfirmed     Telemetry monitor strip  Personally reviewed by me,  shows controlled atrial fibrillation    Stress Nuclear:8/5/2022  Normal myocardial perfusion study. Normal LV size and systolic function. Overall findings represent a low risk study.         ASSESSMENT AND PLAN:      Chest pain is atypical and noncardiac  EKG shows no ischemic changes  Troponins are negative  Stress nuclear scan is negative as well    New onset atrial fibrillation  Paroxysmal  Asymptomatic  However has hypotension  Would recommend using flecainide for rhythm control  Also needs anticoagulation  CHADS2 score is 2  (hypertension age)      Hypertension  Patient is on lisinopril hydrochlorothiazide & metoprolol at home  Currently running low blood pressure; unclear reasons ! Hold lisinopril hydrochlorothiazide for now  I think going home, need his blood pressure medications reassessed.     Deepa Garcia M.D  8/5/2022

## 2022-08-05 NOTE — PROGRESS NOTES
Attempted to call dr Jessica Burleson twice regarding medications that Pee Alejo states he takes at home that arent ordered here. Both attempts went unanswered to a disconnect on their end.  Will attempt again

## 2022-08-05 NOTE — PLAN OF CARE
Problem: Discharge Planning  Goal: Discharge to home or other facility with appropriate resources  Outcome: Progressing  Flowsheets (Taken 8/5/2022 0300)  Discharge to home or other facility with appropriate resources: Identify barriers to discharge with patient and caregiver     Problem: Safety - Adult  Goal: Free from fall injury  Outcome: Progressing     Problem: Pain  Goal: Verbalizes/displays adequate comfort level or baseline comfort level  Outcome: Progressing

## 2022-08-05 NOTE — PLAN OF CARE
Pain/discomfort being managed with PRN analgesics per MD orders. Pt able to express presence and absence of pain and rate pain appropriately using numerical scale. Fall risk assessment completed every shift. All precautions in place. Pt has call light within reach at all times. Room clear of clutter. Pt aware to call for assistance when getting up. Skin assessment completed every shift. Pt assessed for incontinence, appropriate barrier cream applied prn. Pt encouraged to turn/rotate every 2 hours. Assistance provided if pt unable to do so themselves. Intake/Output Summary (Last 24 hours) at 8/5/2022 0746  Last data filed at 8/5/2022 0522  Gross per 24 hour   Intake --   Output 250 ml   Net -250 ml     Vitals:    08/05/22 0346   BP:    Pulse:    Resp: 17   Temp:    SpO2:      Patient assessed for fall risk; fall precautions initiated. Patient and family instructed about safety devices. Environment kept free of clutter and adequate lighting provided. Bed locked and in lowest position. Call light within reach. Will continue to monitor.

## 2022-08-05 NOTE — PROGRESS NOTES
Patient blood glucose noted to be 64 on AM labs. Patient alert and oriented, asymptomatic. Patient given apple juice and a turkey sandwich. Patient safe with call light in reach and bed alarm on for safety.

## 2022-08-06 VITALS
SYSTOLIC BLOOD PRESSURE: 147 MMHG | HEART RATE: 71 BPM | DIASTOLIC BLOOD PRESSURE: 66 MMHG | TEMPERATURE: 98.2 F | OXYGEN SATURATION: 97 % | BODY MASS INDEX: 37.86 KG/M2 | RESPIRATION RATE: 15 BRPM | WEIGHT: 249.78 LBS | HEIGHT: 68 IN

## 2022-08-06 PROBLEM — I95.2 HYPOTENSION DUE TO MEDICATION: Status: ACTIVE | Noted: 2022-08-05

## 2022-08-06 LAB
ALBUMIN SERPL-MCNC: 3.2 G/DL (ref 3.4–5)
ANION GAP SERPL CALCULATED.3IONS-SCNC: 11 MMOL/L (ref 3–16)
BUN BLDV-MCNC: 12 MG/DL (ref 7–20)
CALCIUM SERPL-MCNC: 8.1 MG/DL (ref 8.3–10.6)
CHLORIDE BLD-SCNC: 109 MMOL/L (ref 99–110)
CHOLESTEROL, TOTAL: 129 MG/DL (ref 0–199)
CO2: 25 MMOL/L (ref 21–32)
CREAT SERPL-MCNC: 0.9 MG/DL (ref 0.8–1.3)
EKG ATRIAL RATE: 64 BPM
EKG DIAGNOSIS: NORMAL
EKG P AXIS: 48 DEGREES
EKG P-R INTERVAL: 196 MS
EKG Q-T INTERVAL: 384 MS
EKG QRS DURATION: 98 MS
EKG QTC CALCULATION (BAZETT): 396 MS
EKG R AXIS: 46 DEGREES
EKG T AXIS: 26 DEGREES
EKG VENTRICULAR RATE: 64 BPM
FERRITIN: 235.1 NG/ML (ref 30–400)
FOLATE: >20 NG/ML (ref 4.78–24.2)
GFR AFRICAN AMERICAN: >60
GFR NON-AFRICAN AMERICAN: >60
GLUCOSE BLD-MCNC: 212 MG/DL (ref 70–99)
GLUCOSE BLD-MCNC: 78 MG/DL (ref 70–99)
GLUCOSE BLD-MCNC: 84 MG/DL (ref 70–99)
HCT VFR BLD CALC: 31.3 % (ref 40.5–52.5)
HCT VFR BLD CALC: 31.8 % (ref 40.5–52.5)
HDLC SERPL-MCNC: 45 MG/DL (ref 40–60)
HEMOGLOBIN: 10.6 G/DL (ref 13.5–17.5)
IMMATURE RETIC FRACT: 0.51 (ref 0.21–0.37)
IRON SATURATION: 32 % (ref 20–50)
IRON: 88 UG/DL (ref 59–158)
LDL CHOLESTEROL CALCULATED: 71 MG/DL
MCH RBC QN AUTO: 34.1 PG (ref 26–34)
MCHC RBC AUTO-ENTMCNC: 34 G/DL (ref 31–36)
MCV RBC AUTO: 100.3 FL (ref 80–100)
PDW BLD-RTO: 13.6 % (ref 12.4–15.4)
PERFORMED ON: ABNORMAL
PERFORMED ON: NORMAL
PHOSPHORUS: 3.1 MG/DL (ref 2.5–4.9)
PLATELET # BLD: 142 K/UL (ref 135–450)
PMV BLD AUTO: 8.6 FL (ref 5–10.5)
POTASSIUM SERPL-SCNC: 3.7 MMOL/L (ref 3.5–5.1)
PROTEIN PROTEIN: 0.02 G/DL
PROTEIN PROTEIN: 24 MG/DL
RBC # BLD: 3.12 M/UL (ref 4.2–5.9)
RETICULOCYTE ABSOLUTE COUNT: 0.08 M/UL
RETICULOCYTE COUNT PCT: 2.51 % (ref 0.5–2.18)
SODIUM BLD-SCNC: 145 MMOL/L (ref 136–145)
TOTAL IRON BINDING CAPACITY: 277 UG/DL (ref 260–445)
TRIGL SERPL-MCNC: 67 MG/DL (ref 0–150)
VITAMIN B-12: 256 PG/ML (ref 211–911)
VLDLC SERPL CALC-MCNC: 13 MG/DL
WBC # BLD: 3.7 K/UL (ref 4–11)

## 2022-08-06 PROCEDURE — 80069 RENAL FUNCTION PANEL: CPT

## 2022-08-06 PROCEDURE — 85045 AUTOMATED RETICULOCYTE COUNT: CPT

## 2022-08-06 PROCEDURE — 83540 ASSAY OF IRON: CPT

## 2022-08-06 PROCEDURE — 84155 ASSAY OF PROTEIN SERUM: CPT

## 2022-08-06 PROCEDURE — 82746 ASSAY OF FOLIC ACID SERUM: CPT

## 2022-08-06 PROCEDURE — 85027 COMPLETE CBC AUTOMATED: CPT

## 2022-08-06 PROCEDURE — 84166 PROTEIN E-PHORESIS/URINE/CSF: CPT

## 2022-08-06 PROCEDURE — 93010 ELECTROCARDIOGRAM REPORT: CPT | Performed by: INTERNAL MEDICINE

## 2022-08-06 PROCEDURE — 6360000002 HC RX W HCPCS: Performed by: INTERNAL MEDICINE

## 2022-08-06 PROCEDURE — 6370000000 HC RX 637 (ALT 250 FOR IP): Performed by: INTERNAL MEDICINE

## 2022-08-06 PROCEDURE — 80061 LIPID PANEL: CPT

## 2022-08-06 PROCEDURE — 93005 ELECTROCARDIOGRAM TRACING: CPT

## 2022-08-06 PROCEDURE — 93005 ELECTROCARDIOGRAM TRACING: CPT | Performed by: INTERNAL MEDICINE

## 2022-08-06 PROCEDURE — 84165 PROTEIN E-PHORESIS SERUM: CPT

## 2022-08-06 PROCEDURE — 36415 COLL VENOUS BLD VENIPUNCTURE: CPT

## 2022-08-06 PROCEDURE — 82728 ASSAY OF FERRITIN: CPT

## 2022-08-06 PROCEDURE — 82607 VITAMIN B-12: CPT

## 2022-08-06 PROCEDURE — 83550 IRON BINDING TEST: CPT

## 2022-08-06 PROCEDURE — 99232 SBSQ HOSP IP/OBS MODERATE 35: CPT | Performed by: NURSE PRACTITIONER

## 2022-08-06 PROCEDURE — 84156 ASSAY OF PROTEIN URINE: CPT

## 2022-08-06 RX ORDER — METOPROLOL SUCCINATE 50 MG/1
50 TABLET, EXTENDED RELEASE ORAL DAILY
Qty: 30 TABLET | Refills: 3 | Status: SHIPPED | OUTPATIENT
Start: 2022-08-06 | End: 2022-08-24

## 2022-08-06 RX ORDER — FLECAINIDE ACETATE 50 MG/1
50 TABLET ORAL EVERY 12 HOURS SCHEDULED
Qty: 60 TABLET | Refills: 3 | Status: SHIPPED | OUTPATIENT
Start: 2022-08-06

## 2022-08-06 RX ADMIN — ENOXAPARIN SODIUM 30 MG: 100 INJECTION SUBCUTANEOUS at 08:35

## 2022-08-06 RX ADMIN — FLECAINIDE ACETATE 50 MG: 100 TABLET ORAL at 08:35

## 2022-08-06 RX ADMIN — FAMOTIDINE 20 MG: 20 TABLET, FILM COATED ORAL at 08:35

## 2022-08-06 RX ADMIN — PREGABALIN 75 MG: 75 CAPSULE ORAL at 08:35

## 2022-08-06 RX ADMIN — OXYCODONE AND ACETAMINOPHEN 1 TABLET: 5; 325 TABLET ORAL at 08:35

## 2022-08-06 RX ADMIN — ASPIRIN 81 MG: 81 TABLET, CHEWABLE ORAL at 08:35

## 2022-08-06 RX ADMIN — THERA TABS 1 TABLET: TAB at 08:35

## 2022-08-06 RX ADMIN — METOPROLOL SUCCINATE 50 MG: 50 TABLET, EXTENDED RELEASE ORAL at 08:35

## 2022-08-06 ASSESSMENT — PAIN SCALES - GENERAL
PAINLEVEL_OUTOF10: 4
PAINLEVEL_OUTOF10: 7

## 2022-08-06 ASSESSMENT — PAIN DESCRIPTION - LOCATION: LOCATION: BACK

## 2022-08-06 ASSESSMENT — PAIN DESCRIPTION - DESCRIPTORS: DESCRIPTORS: DISCOMFORT;CRAMPING

## 2022-08-06 ASSESSMENT — PAIN DESCRIPTION - PAIN TYPE: TYPE: CHRONIC PAIN

## 2022-08-06 ASSESSMENT — PAIN DESCRIPTION - ORIENTATION: ORIENTATION: LOWER;MID

## 2022-08-06 NOTE — DISCHARGE INSTR - DIET

## 2022-08-06 NOTE — PROGRESS NOTES
Discussed plan for discharge, sent urine specimen that was ordered and called lab for blood draw. Patient's wife will be here around 4pm for transport.

## 2022-08-06 NOTE — PROGRESS NOTES
Written discharge instructions including diet, activity, weight monitoring, what to do if symptoms worsen and the importance of follow up care and need to call his/her physician for  an appointment were reviewed with the patient who verbalized understanding. These written instructions were given to the patient to take home and a copy was made for the medical record. Reviewed the signs and symptoms of a stroke with he and his wife. He has a new prescription for eliquis and was provided a coupon and instructed to  today for tonights dose.  Patient Electronically signed by Nasim Carson RN on 8/6/2022 at 3:42 PM

## 2022-08-06 NOTE — PROGRESS NOTES
Cardiology - PROGRESS NOTE    Admit Date: 8/4/2022     Reason for follow up: chest pain and hypotension     69 y.o. male came to the ER because of chest pain and hypotension. The patient was at home and may have had a meal when he had discomfort in his chest lasted for short period of time he described this as a pressure. There was no associated nausea vomiting radiation. His wife took his blood pressure and it was very low and that is when she called the squad. The squad brought him in and the blood pressure was in the 80s. In the ER he was found to be in A. fib. I personally looked at the monitor strips in the hospital and did confirm controlled A. fib. The patient is not aware of them      Social History:   reports that he has never smoked. He has never used smokeless tobacco. He reports that he does not drink alcohol and does not use drugs. Family History: family history includes Diabetes in his father and mother; Heart Disease in his father and mother; High Blood Pressure in his father and mother. Interval History:   Patient seen and examined and notes reviewed   Sitting in chair  Feels well  Denies chest pain or palpitations   Denies chest pain   All other systems reviewed and negative except as above. Diet: ADULT DIET; Regular; No Caffeine  Pain is:None  Nausea:None    In: 3491.2 [P.O.:840;  I.V.:2651.2]  Out: 1600    Wt Readings from Last 3 Encounters:   08/06/22 249 lb 12.5 oz (113.3 kg)   02/02/22 253 lb (114.8 kg)   01/20/22 253 lb (114.8 kg)           Data:   Scheduled Meds:   Scheduled Meds:   atorvastatin  20 mg Oral Nightly    insulin glargine  0.15 Units/kg SubCUTAneous Nightly    insulin lispro  0.05 Units/kg SubCUTAneous TID WC    insulin lispro  0-4 Units SubCUTAneous TID WC    insulin lispro  0-4 Units SubCUTAneous Nightly    sodium chloride flush  5-40 mL IntraVENous 2 times per day    aspirin  81 mg Oral Daily famotidine  20 mg Oral BID    enoxaparin  30 mg SubCUTAneous BID    multivitamin  1 tablet Oral Daily    metoprolol succinate  50 mg Oral Daily    pregabalin  75 mg Oral TID    flecainide  50 mg Oral 2 times per day     Continuous Infusions:   dextrose      sodium chloride       PRN Meds:.amitriptyline, oxyCODONE-acetaminophen, zolpidem, nitroGLYCERIN, glucose, dextrose bolus **OR** dextrose bolus, glucagon (rDNA), dextrose, sodium chloride flush, sodium chloride, ondansetron **OR** ondansetron, acetaminophen **OR** acetaminophen, polyethylene glycol, nitroGLYCERIN, LORazepam, amLODIPine, tiZANidine  Continuous Infusions:   dextrose      sodium chloride         Intake/Output Summary (Last 24 hours) at 8/6/2022 0958  Last data filed at 8/6/2022 3617  Gross per 24 hour   Intake 3491.21 ml   Output 1100 ml   Net 2391.21 ml       CBC:   Recent Labs     08/06/22  0624   WBC 3.7*   HGB 10.6*        BMP:  Recent Labs     08/06/22  0624      K 3.7      CO2 25   BUN 12   CREATININE 0.9   GLUCOSE 78     ABGs: No results found for: PHART, PO2ART, TKF9AFP  INR: No results for input(s): INR in the last 72 hours. CARDIAC LABS     ENZYMES:  Recent Labs     08/05/22  0000 08/05/22  0338   TROPONINI <0.01 <0.01     FASTING LIPID PANEL:  Lab Results   Component Value Date/Time    HDL 45 08/06/2022 06:24 AM    LDLCALC 71 08/06/2022 06:24 AM    TRIG 67 08/06/2022 06:24 AM     LIVER PROFILE:No results for input(s): AST, ALT, ALB in the last 72 hours. -----------------------------------------------------------------  Telemetry: personally reviewed   SR with frequent PAC     GXT:   Stress Nuclear:8/5/2022  Normal myocardial perfusion study. Normal LV size and systolic function. Overall findings represent a low risk study.         Objective:   Vitals: BP (!) 146/86   Pulse 57   Temp 98.2 °F (36.8 °C) (Oral)   Resp 12   Ht 5' 8\" (1.727 m)   Wt 249 lb 12.5 oz (113.3 kg)   SpO2 95%   BMI 37.98 kg/m²   General appearance: alert, appears stated age and cooperative, No acute distress   Skin: Skin color, texture, turgor normal. No rashes or ecchymosis. HEENT: Head: Normal, normocephalic, atraumatic. Neck: no carotid bruit, no JVD, supple, symmetrical, trachea midline, and thyroid not enlarged, symmetric, no tenderness/mass/nodules  Lungs: clear to auscultation bilaterally, no accessory muscle use, no respiratory distress,   Heart: regular rate and rhythm, S1, S2 normal, no murmur, click, rub or gallop and frequent ectopy   Abdomen: soft, non-tender; bowel sounds normal; no masses,  no organomegaly  Extremities: extremities normal, atraumatic, no cyanosis or edema, pulses: DP +2/+2, PT +2/+2  Neurologic: Mental status: Alert, oriented, thought content appropriate, no tremors, no gross sensory motor deficit,   Psychiatric: normal insight and affect      Assessment & Plan:    Patient Active Problem List:    Plan:    1. Atrial Fibrillation    Paroxsymal    Currently SR with frequent PAC    Pt denies symptoms    On Flecainide and beta-blocker     Continue    CHADSVASC 2 for age and HTN     Currently on ASA 81 mg    Would recommend anticoagulation therapy (Eliquis 5 mg PO BID)   2. Chest pain    Atypical   No ischemic changes on ECG   Stress test on 8/5/2022 low risk    Continue risk factor modifications for CAD     3. Hypotension/HTN  Off home medications secondary to hypotension   BP borderline this AM    continue beta-blocker therapy   Would continue to hold ACE and HCTZ at this time   4. HILLARY    Creatinine improved  Avoid nephrotoxic agents     Patient is stable from a CV standpoint. Cardiology will sign off with outpatient follow up in 2-3 weeks. Please call with questions.          RHYS Lai-CNP   [address]  Cardiology   8/6/2022  9:58 AM

## 2022-08-06 NOTE — PROGRESS NOTES
disintegrating tablet 4 mg  4 mg Oral Q8H PRRADHA Graff MD        Or    ondansetron [facility]) injection 4 mg  4 mg IntraVENous Q6H PRRADHA Graff MD        acetaminophen (TYLENOL) tablet 650 mg  650 mg Oral Q6H DAISY Graff MD        Or    acetaminophen (TYLENOL) suppository 650 mg  650 mg Rectal Q6H PRRADHA Graff MD        polyethylene glycol (GLYCOLAX) packet 17 g  17 g Oral Daily PRRADHA Graff MD        aspirin chewable tablet 81 mg  81 mg Oral Daily Zena Graff MD   81 mg at 08/06/22 0835    famotidine (PEPCID) tablet 20 mg  20 mg Oral BID Zena Graff MD   20 mg at 08/06/22 4134    nitroGLYCERIN (NITROSTAT) SL tablet 0.4 mg  0.4 mg SubLINGual Q5 Min PRRADHA Graff MD        LORazepam (ATIVAN) tablet 0.5 mg  0.5 mg Oral Q4H PRRADHA Graff MD        enoxaparin Sodium (LOVENOX) injection 30 mg  30 mg SubCUTAneous BID Zena Graff MD   30 mg at 08/06/22 3328    multivitamin 1 tablet  1 tablet Oral Daily Zena Graff MD   1 tablet at 08/06/22 0835    amLODIPine (NORVASC) tablet 2.5 mg  2.5 mg Oral BID DAISY Graff MD        metoprolol succinate (TOPROL XL) extended release tablet 50 mg  50 mg Oral Daily Zena Graff MD   50 mg at 08/06/22 0835    pregabalin (LYRICA) capsule 75 mg  75 mg Oral TID Zena Graff MD   75 mg at 08/06/22 0835    tiZANidine (ZANAFLEX) tablet 4 mg  4 mg Oral TID DAISY Graff MD   4 mg at 08/05/22 0878    flecainide (TAMBOCOR) tablet 50 mg  50 mg Oral 2 times per day Kiran Champagne MD   50 mg at 08/06/22 3646     Allergies   Allergen Reactions    Prochlorperazine Swelling     Swelling of tongue    Prednisone      Makes him ill. Principal Problem:    Chest pain  Active Problems:    Acute kidney injury ([address]    Arterial hypotension  Resolved Problems:    * No resolved hospital problems.  *    Blood pressure 133/68, pulse 68, temperature 98.3 °F (36.8 °C), temperature source Oral, resp. rate 16, height 5' 8\" (1.727 m), weight 249 lb 12.5 oz (113.3 kg), SpO2 95 %. Subjective No further CP  Objective:  Vital signs: (most recent): Blood pressure 133/68, pulse 68, temperature 98.3 °F (36.8 °C), temperature source Oral, resp. rate 16, height 5' 8\" (1.727 m), weight 249 lb 12.5 oz (113.3 kg), SpO2 95 %. Developed A fib with controlled rate and no sx. CTPA , IIR&R, no edema Renal function back to nl. With hydration. New anemia. Will investigate. [address] for D/C. Instructions given.     Hilary Lopez MD  8/6/2022

## 2022-08-06 NOTE — CARE COORDINATION
INITIAL CASE MANAGEMENT ASSESSMENT    Reviewed chart, spoke with patient to assess possible discharge needs. Explained Case Management role/services. Living Situation: Lives with wife    ADLs: independent     DME: none    PT/OT Recs: N/A     Active Services: none     Transportation: wife will transport home     Medications: [address]    PCP:   Pooja Estrada MD         HD/PD: N/A    PLAN/COMMENTS: Patient to return home with family support. ACP completed    The Plan for Transition of Care is related to the following treatment goals: returned home    The Patient was provided with a choice of provider and agrees   with the discharge plan. [x] Yes [] No    Freedom of choice list was provided with basic dialogue that supports the patient's individualized plan of care/goals, treatment preferences and shares the quality data associated with the providers. [x] Yes [] No    SW/CM provided contact information for patient or family to call with any questions. SW/CM will follow and assist as needed.

## 2022-08-06 NOTE — CARE COORDINATION
CASE MANAGEMENT DISCHARGE SUMMARY: No identified Needs.     DISCHARGE DATE: 8/6/22    DISCHARGED TO HOME     TRANSPORTATION: Wife to transport                   Electronically signed by CLAUDIA Dejesus on 8/6/2022 at 12:23 PM

## 2022-08-06 NOTE — DISCHARGE INSTR - LAB
Make an appointment with Dr. Tanvir Schwarz, cardiac electrophysiologist.  See Dr. Nehemiah Eason, as scheduled.

## 2022-08-06 NOTE — ACP (ADVANCE CARE PLANNING)
Advance Care Planning     Advance Care Planning Activator (Inpatient)  Conversation Note      Date of ACP Conversation: 8/6/2022     Conversation Conducted with: Patient with Decision Making Capacity    ACP Activator: [address] Maker:     Current Designated Health Care Decision Maker:     Primary Decision Maker: Valerie 44 - [phone]    Secondary Decision Maker: Dot Danielle Child - [phone]    Today we documented Decision Maker(s) consistent with Legal Next of Kin hierarchy. Care Preferences    Ventilation: \"If you were in your present state of health and suddenly became very ill and were unable to breathe on your own, what would your preference be about the use of a ventilator (breathing machine) if it were available to you? \"      Would the patient desire the use of ventilator (breathing machine)?: no    \"If your health worsens and it becomes clear that your chance of recovery is unlikely, what would your preference be about the use of a ventilator (breathing machine) if it were available to you? \"     Would the patient desire the use of ventilator (breathing machine)?: No      Resuscitation  \"CPR works best to restart the heart when there is a sudden event, like a heart attack, in someone who is otherwise healthy. Unfortunately, CPR does not typically restart the heart for people who have serious health conditions or who are very sick. \"    \"In the event your heart stopped as a result of an underlying serious health condition, would you want attempts to be made to restart your heart (answer \"yes\" for attempt to resuscitate) or would you prefer a natural death (answer \"no\" for do not attempt to resuscitate)? \" yes       [x] Yes   [] No   Educated Patient / Kristi Drake regarding differences between Advance Directives and portable DNR orders.     Length of ACP Conversation in minutes:  10    Conversation Outcomes:  [x] ACP discussion completed  [] Existing advance directive reviewed with patient; no changes to patient's previously recorded wishes  [] New Advance Directive completed  [] Portable Do Not Rescitate prepared for Provider review and signature  [] POLST/POST/MOLST/MOST prepared for Provider review and signature      Follow-up plan:    [] Schedule follow-up conversation to continue planning  [] Referred individual to Provider for additional questions/concerns   [] Advised patient/agent/surrogate to review completed ACP document and update if needed with changes in condition, patient preferences or care setting    [x] This note routed to one or more involved healthcare providers

## 2022-08-07 NOTE — PROGRESS NOTES
Nephrology ([facility]) Progress Note    CC: HILLARY    Subjective:    HPI:  Breathing comfortably. No CP. Renal function better. BP better. ROS:  In bed. No fever. [address]:  medications reviewed. Objective:  Blood pressure (!) 147/66, pulse 71, temperature 98.2 °F (36.8 °C), temperature source Oral, resp. rate 15, height 5' 8\" (1.727 m), weight 249 lb 12.5 oz (113.3 kg), SpO2 97 %. Intake/Output Summary (Last 24 hours) at 8/6/2022 2107  Last data filed at 8/6/2022 1001  Gross per 24 hour   Intake 3538.63 ml   Output 585 ml   Net 2953.63 ml     General:  NAD, A+Ox3  Chest:  CTAB  CVS:  RRR  Abdominal:  NTND, soft, +BS  Extremities:  no edema  Skin:  no rash    Labs:  Renal panel:  Lab Results   Component Value Date/Time     08/06/2022 06:24 AM    K 3.7 08/06/2022 06:24 AM    K 4.3 08/05/2022 12:00 AM    CO2 25 08/06/2022 06:24 AM    BUN 12 08/06/2022 06:24 AM    CREATININE 0.9 08/06/2022 06:24 AM    CALCIUM 8.1 (L) 08/06/2022 06:24 AM    PHOS 3.1 08/06/2022 06:24 AM    MG 1.90 06/06/2019 03:27 PM     CBC:  Lab Results   Component Value Date/Time    WBC 3.7 (L) 08/06/2022 06:24 AM    HGB 10.6 (L) 08/06/2022 06:24 AM    HCT 31.3 (L) 08/06/2022 06:24 AM    HCT 31.8 (L) 08/06/2022 06:24 AM     08/06/2022 06:24 AM       Assessment/Plan:  Reviewed old records and labs. 1) HILLARY              - baseline 12/31/21 Cr 1.0 > 1.6, stage 1 of 3 HILLARY              - pre-renal              - renal function has normalized              - d/c IVF     2) CV              - BP is much improved              - not sure this is dehydration as he was indoors and had access to fluids, and despite IVF here, his  for the last reading              - would hold off restarting lisinopril and HCTZ until patient is seen in the office by Dr. Stephania Fraga   - advised patient to use a pill box     3) chest discomfort              - cardiac enzymes negative     No renal follow-up necessary.   Thank you for this consult

## 2022-08-07 NOTE — DISCHARGE SUMMARY
cholesterol  was 129, LDL 71, HDL 45, triglycerides fasting were 67. Albumin was  slightly low at 3.2. EKG in the emergency room showed sinus rhythm with  PACs and some bigemini. EKG rhythm strip showed atrial fibrillation  with controlled rate. A 12-lead EKG on 08/06 showed sinus rhythm with  PACs. Myoview scan showed normal LV function and circulation. Chest  x-ray suggested some possible bronchiolitis at the left base but patient  had no respiratory symptoms. Further laboratory testing relating to his  anemia is pending and will be followed up as an outpatient. HOSPITAL COURSE:  The patient's hospital course was characterized by  improvement. His blood pressure came back up with hydration and  discontinuing his lisinopril/hydrochlorothiazide. He had no further  chest pain. Paroxysmal AFib was controlled. Rate was noted and Dr. Virginia Lyn  began the patient on flecainide 50 mg twice a day and increased his  metoprolol from 25-50 mg once a day. The patient was seen in Nephrology  consultation who agreed with hydration for his hypertension and acute  kidney injury and this appeared to resolve with hydration. ALLERGIES:  The patient was intolerant to COMPAZINE and PREDNISONE. Subsequently, the patient was discharged home in improved condition. Discharge meds were per the med rec. Discharge instructions were  discussed with the patient. He is to follow up with Dr. Mumtaz Paul. He  already has an appointment for this month and he is to make an  appointment to follow up with Dr. Leelee Velázquez for his new-onset atrial  fibrillation.         Linda Abreu MD    D: 08/06/2022 11:47:13       T: 08/06/2022 11:50:51     [email or web address]  Job#: [id number]     Doc#: [id number]    CC:

## 2022-08-08 ENCOUNTER — TELEPHONE (OUTPATIENT)
Dept: CARDIOLOGY CLINIC | Age: 67
End: 2022-08-08

## 2022-08-08 LAB
ALBUMIN SERPL-MCNC: 2.8 G/DL (ref 3.1–4.9)
ALPHA-1-GLOBULIN: 0.2 G/DL (ref 0.2–0.4)
ALPHA-2-GLOBULIN: 0.9 G/DL (ref 0.4–1.1)
BETA GLOBULIN: 1 G/DL (ref 0.9–1.6)
GAMMA GLOBULIN: 1 G/DL (ref 0.6–1.8)
SPE/IFE INTERPRETATION: NORMAL
TOTAL PROTEIN: 5.8 G/DL (ref 6.4–8.2)
URINE ELECTROPHORESIS INTERP: NORMAL

## 2022-08-08 NOTE — TELEPHONE ENCOUNTER
Patient was seen in  by Dr. Jaylen Garner. Patient discharged over the weekend and needs f/u with Dr. Jaylen Garner in 2-3 weeks. 15 minute appointment is fine.     Please call patient to schedule

## 2022-08-19 ENCOUNTER — HOSPITAL ENCOUNTER (INPATIENT)
Age: 67
LOS: 4 days | Discharge: HOME | DRG: 287 | End: 2022-08-24
Attending: INTERNAL MEDICINE | Admitting: INTERNAL MEDICINE
Payer: COMMERCIAL

## 2022-08-19 DIAGNOSIS — R06.09 DOE (DYSPNEA ON EXERTION): Primary | ICD-10-CM

## 2022-08-19 DIAGNOSIS — R77.8 ELEVATED TROPONIN: ICD-10-CM

## 2022-08-19 PROCEDURE — 83880 ASSAY OF NATRIURETIC PEPTIDE: CPT

## 2022-08-19 PROCEDURE — 85025 COMPLETE CBC W/AUTO DIFF WBC: CPT

## 2022-08-19 PROCEDURE — 83690 ASSAY OF LIPASE: CPT

## 2022-08-19 PROCEDURE — 80053 COMPREHEN METABOLIC PANEL: CPT

## 2022-08-19 PROCEDURE — 84484 ASSAY OF TROPONIN QUANT: CPT

## 2022-08-19 PROCEDURE — 99285 EMERGENCY DEPT VISIT HI MDM: CPT

## 2022-08-19 PROCEDURE — 36415 COLL VENOUS BLD VENIPUNCTURE: CPT

## 2022-08-19 ASSESSMENT — ENCOUNTER SYMPTOMS
COUGH: 0
RHINORRHEA: 0
ABDOMINAL PAIN: 0
DIARRHEA: 0
VOMITING: 0
NAUSEA: 0
SHORTNESS OF BREATH: 1

## 2022-08-20 ENCOUNTER — APPOINTMENT (OUTPATIENT)
Dept: CT IMAGING | Age: 67
DRG: 287 | End: 2022-08-20
Payer: COMMERCIAL

## 2022-08-20 PROBLEM — R06.09 DYSPNEA ON EXERTION: Status: ACTIVE | Noted: 2022-08-20

## 2022-08-20 LAB
A/G RATIO: 1.5 (ref 1.1–2.2)
ALBUMIN SERPL-MCNC: 3.7 G/DL (ref 3.4–5)
ALP BLD-CCNC: 80 U/L (ref 40–129)
ALT SERPL-CCNC: 43 U/L (ref 10–40)
ANION GAP SERPL CALCULATED.3IONS-SCNC: 7 MMOL/L (ref 3–16)
AST SERPL-CCNC: 52 U/L (ref 15–37)
BASOPHILS ABSOLUTE: 0 K/UL (ref 0–0.2)
BASOPHILS RELATIVE PERCENT: 0.5 %
BILIRUB SERPL-MCNC: 0.8 MG/DL (ref 0–1)
BUN BLDV-MCNC: 15 MG/DL (ref 7–20)
CALCIUM SERPL-MCNC: 8.4 MG/DL (ref 8.3–10.6)
CHLORIDE BLD-SCNC: 109 MMOL/L (ref 99–110)
CHOLESTEROL, TOTAL: 113 MG/DL (ref 0–199)
CO2: 26 MMOL/L (ref 21–32)
CREAT SERPL-MCNC: 0.9 MG/DL (ref 0.8–1.3)
EOSINOPHILS ABSOLUTE: 0.1 K/UL (ref 0–0.6)
EOSINOPHILS RELATIVE PERCENT: 1.5 %
GFR AFRICAN AMERICAN: >60
GFR NON-AFRICAN AMERICAN: >60
GLUCOSE BLD-MCNC: 101 MG/DL (ref 70–99)
GLUCOSE BLD-MCNC: 113 MG/DL (ref 70–99)
GLUCOSE BLD-MCNC: 132 MG/DL (ref 70–99)
GLUCOSE BLD-MCNC: 162 MG/DL (ref 70–99)
GLUCOSE BLD-MCNC: 208 MG/DL (ref 70–99)
HCT VFR BLD CALC: 29.2 % (ref 40.5–52.5)
HDLC SERPL-MCNC: 47 MG/DL (ref 40–60)
HEMOGLOBIN: 9.6 G/DL (ref 13.5–17.5)
LDL CHOLESTEROL CALCULATED: 57 MG/DL
LIPASE: 11 U/L (ref 13–60)
LV EF: 65 %
LVEF MODALITY: NORMAL
LYMPHOCYTES ABSOLUTE: 0.9 K/UL (ref 1–5.1)
LYMPHOCYTES RELATIVE PERCENT: 20.3 %
MCH RBC QN AUTO: 33.2 PG (ref 26–34)
MCHC RBC AUTO-ENTMCNC: 32.8 G/DL (ref 31–36)
MCV RBC AUTO: 101.5 FL (ref 80–100)
MONOCYTES ABSOLUTE: 0.3 K/UL (ref 0–1.3)
MONOCYTES RELATIVE PERCENT: 7.8 %
NEUTROPHILS ABSOLUTE: 3.1 K/UL (ref 1.7–7.7)
NEUTROPHILS RELATIVE PERCENT: 69.9 %
PDW BLD-RTO: 13.8 % (ref 12.4–15.4)
PERFORMED ON: ABNORMAL
PLATELET # BLD: 144 K/UL (ref 135–450)
PMV BLD AUTO: 9.2 FL (ref 5–10.5)
POTASSIUM SERPL-SCNC: 3.8 MMOL/L (ref 3.5–5.1)
PRO-BNP: 123 PG/ML (ref 0–124)
RBC # BLD: 2.88 M/UL (ref 4.2–5.9)
SODIUM BLD-SCNC: 142 MMOL/L (ref 136–145)
TOTAL PROTEIN: 6.1 G/DL (ref 6.4–8.2)
TRIGL SERPL-MCNC: 47 MG/DL (ref 0–150)
TROPONIN: 0.02 NG/ML
TROPONIN: 0.03 NG/ML
VLDLC SERPL CALC-MCNC: 9 MG/DL
WBC # BLD: 4.4 K/UL (ref 4–11)

## 2022-08-20 PROCEDURE — 2060000000 HC ICU INTERMEDIATE R&B

## 2022-08-20 PROCEDURE — 71260 CT THORAX DX C+: CPT | Performed by: PHYSICIAN ASSISTANT

## 2022-08-20 PROCEDURE — 6360000002 HC RX W HCPCS: Performed by: NURSE PRACTITIONER

## 2022-08-20 PROCEDURE — 6370000000 HC RX 637 (ALT 250 FOR IP): Performed by: INTERNAL MEDICINE

## 2022-08-20 PROCEDURE — 36415 COLL VENOUS BLD VENIPUNCTURE: CPT

## 2022-08-20 PROCEDURE — B2151ZZ FLUOROSCOPY OF LEFT HEART USING LOW OSMOLAR CONTRAST: ICD-10-PCS | Performed by: INTERNAL MEDICINE

## 2022-08-20 PROCEDURE — 93005 ELECTROCARDIOGRAM TRACING: CPT | Performed by: PHYSICIAN ASSISTANT

## 2022-08-20 PROCEDURE — 99223 1ST HOSP IP/OBS HIGH 75: CPT | Performed by: INTERNAL MEDICINE

## 2022-08-20 PROCEDURE — B2111ZZ FLUOROSCOPY OF MULTIPLE CORONARY ARTERIES USING LOW OSMOLAR CONTRAST: ICD-10-PCS | Performed by: INTERNAL MEDICINE

## 2022-08-20 PROCEDURE — 80061 LIPID PANEL: CPT

## 2022-08-20 PROCEDURE — 6360000002 HC RX W HCPCS: Performed by: INTERNAL MEDICINE

## 2022-08-20 PROCEDURE — 6360000004 HC RX CONTRAST MEDICATION: Performed by: PHYSICIAN ASSISTANT

## 2022-08-20 PROCEDURE — 84484 ASSAY OF TROPONIN QUANT: CPT

## 2022-08-20 PROCEDURE — 4A023N7 MEASUREMENT OF CARDIAC SAMPLING AND PRESSURE, LEFT HEART, PERCUTANEOUS APPROACH: ICD-10-PCS | Performed by: INTERNAL MEDICINE

## 2022-08-20 PROCEDURE — 2580000003 HC RX 258: Performed by: INTERNAL MEDICINE

## 2022-08-20 PROCEDURE — 93306 TTE W/DOPPLER COMPLETE: CPT

## 2022-08-20 PROCEDURE — 70450 CT HEAD/BRAIN W/O DYE: CPT

## 2022-08-20 RX ORDER — ONDANSETRON 2 MG/ML
4 INJECTION INTRAMUSCULAR; INTRAVENOUS EVERY 6 HOURS PRN
Status: DISCONTINUED | OUTPATIENT
Start: 2022-08-20 | End: 2022-08-24 | Stop reason: HOSPADM

## 2022-08-20 RX ORDER — SODIUM CHLORIDE 0.9 % (FLUSH) 0.9 %
5-40 SYRINGE (ML) INJECTION EVERY 12 HOURS SCHEDULED
Status: DISCONTINUED | OUTPATIENT
Start: 2022-08-20 | End: 2022-08-24 | Stop reason: HOSPADM

## 2022-08-20 RX ORDER — ENOXAPARIN SODIUM 100 MG/ML
40 INJECTION SUBCUTANEOUS DAILY
Status: DISCONTINUED | OUTPATIENT
Start: 2022-08-20 | End: 2022-08-24 | Stop reason: HOSPADM

## 2022-08-20 RX ORDER — POLYETHYLENE GLYCOL 3350 17 G/17G
17 POWDER, FOR SOLUTION ORAL DAILY PRN
Status: DISCONTINUED | OUTPATIENT
Start: 2022-08-20 | End: 2022-08-24 | Stop reason: HOSPADM

## 2022-08-20 RX ORDER — OXYCODONE HYDROCHLORIDE AND ACETAMINOPHEN 5; 325 MG/1; MG/1
1 TABLET ORAL EVERY 8 HOURS PRN
Status: DISCONTINUED | OUTPATIENT
Start: 2022-08-20 | End: 2022-08-24 | Stop reason: HOSPADM

## 2022-08-20 RX ORDER — CELECOXIB 200 MG/1
200 CAPSULE ORAL 2 TIMES DAILY
Status: DISCONTINUED | OUTPATIENT
Start: 2022-08-20 | End: 2022-08-20

## 2022-08-20 RX ORDER — ATORVASTATIN CALCIUM 40 MG/1
40 TABLET, FILM COATED ORAL NIGHTLY
Status: DISCONTINUED | OUTPATIENT
Start: 2022-08-20 | End: 2022-08-24 | Stop reason: HOSPADM

## 2022-08-20 RX ORDER — FLECAINIDE ACETATE 50 MG/1
50 TABLET ORAL EVERY 12 HOURS SCHEDULED
Status: DISCONTINUED | OUTPATIENT
Start: 2022-08-20 | End: 2022-08-24 | Stop reason: HOSPADM

## 2022-08-20 RX ORDER — INSULIN LISPRO 100 [IU]/ML
0-4 INJECTION, SOLUTION INTRAVENOUS; SUBCUTANEOUS EVERY 4 HOURS
Status: DISCONTINUED | OUTPATIENT
Start: 2022-08-20 | End: 2022-08-21

## 2022-08-20 RX ORDER — SODIUM CHLORIDE 0.9 % (FLUSH) 0.9 %
5-40 SYRINGE (ML) INJECTION PRN
Status: DISCONTINUED | OUTPATIENT
Start: 2022-08-20 | End: 2022-08-24 | Stop reason: HOSPADM

## 2022-08-20 RX ORDER — ATORVASTATIN CALCIUM 20 MG/1
20 TABLET, FILM COATED ORAL NIGHTLY
Status: DISCONTINUED | OUTPATIENT
Start: 2022-08-20 | End: 2022-08-20

## 2022-08-20 RX ORDER — DEXTROSE MONOHYDRATE 100 MG/ML
INJECTION, SOLUTION INTRAVENOUS CONTINUOUS PRN
Status: DISCONTINUED | OUTPATIENT
Start: 2022-08-20 | End: 2022-08-24 | Stop reason: HOSPADM

## 2022-08-20 RX ORDER — ZOLPIDEM TARTRATE 5 MG/1
5 TABLET ORAL NIGHTLY PRN
Status: DISCONTINUED | OUTPATIENT
Start: 2022-08-20 | End: 2022-08-24 | Stop reason: HOSPADM

## 2022-08-20 RX ORDER — TIZANIDINE 4 MG/1
4 TABLET ORAL 3 TIMES DAILY
Status: DISCONTINUED | OUTPATIENT
Start: 2022-08-20 | End: 2022-08-24 | Stop reason: HOSPADM

## 2022-08-20 RX ORDER — ASPIRIN 81 MG/1
81 TABLET ORAL DAILY
Status: DISCONTINUED | OUTPATIENT
Start: 2022-08-20 | End: 2022-08-24 | Stop reason: HOSPADM

## 2022-08-20 RX ORDER — HYDRALAZINE HYDROCHLORIDE 20 MG/ML
5 INJECTION INTRAMUSCULAR; INTRAVENOUS EVERY 6 HOURS PRN
Status: DISCONTINUED | OUTPATIENT
Start: 2022-08-20 | End: 2022-08-23

## 2022-08-20 RX ORDER — ACETAMINOPHEN 325 MG/1
650 TABLET ORAL EVERY 6 HOURS PRN
Status: DISCONTINUED | OUTPATIENT
Start: 2022-08-20 | End: 2022-08-24 | Stop reason: HOSPADM

## 2022-08-20 RX ORDER — ACETAMINOPHEN 650 MG/1
650 SUPPOSITORY RECTAL EVERY 6 HOURS PRN
Status: DISCONTINUED | OUTPATIENT
Start: 2022-08-20 | End: 2022-08-24 | Stop reason: HOSPADM

## 2022-08-20 RX ORDER — FUROSEMIDE 10 MG/ML
INJECTION INTRAMUSCULAR; INTRAVENOUS
Status: DISPENSED
Start: 2022-08-20 | End: 2022-08-20

## 2022-08-20 RX ORDER — SODIUM CHLORIDE 9 MG/ML
INJECTION, SOLUTION INTRAVENOUS PRN
Status: DISCONTINUED | OUTPATIENT
Start: 2022-08-20 | End: 2022-08-24 | Stop reason: HOSPADM

## 2022-08-20 RX ORDER — PREGABALIN 75 MG/1
75 CAPSULE ORAL 3 TIMES DAILY
Status: DISCONTINUED | OUTPATIENT
Start: 2022-08-20 | End: 2022-08-24 | Stop reason: HOSPADM

## 2022-08-20 RX ORDER — ONDANSETRON 4 MG/1
4 TABLET, ORALLY DISINTEGRATING ORAL EVERY 8 HOURS PRN
Status: DISCONTINUED | OUTPATIENT
Start: 2022-08-20 | End: 2022-08-24 | Stop reason: HOSPADM

## 2022-08-20 RX ORDER — ASPIRIN 81 MG/1
324 TABLET, CHEWABLE ORAL ONCE
Status: DISCONTINUED | OUTPATIENT
Start: 2022-08-20 | End: 2022-08-24 | Stop reason: HOSPADM

## 2022-08-20 RX ADMIN — OXYCODONE AND ACETAMINOPHEN 1 TABLET: 5; 325 TABLET ORAL at 06:35

## 2022-08-20 RX ADMIN — ZOLPIDEM TARTRATE 5 MG: 5 TABLET ORAL at 23:06

## 2022-08-20 RX ADMIN — INSULIN LISPRO 1 UNITS: 100 INJECTION, SOLUTION INTRAVENOUS; SUBCUTANEOUS at 15:58

## 2022-08-20 RX ADMIN — ASPIRIN 81 MG: 81 TABLET, COATED ORAL at 08:24

## 2022-08-20 RX ADMIN — Medication 10 ML: at 21:07

## 2022-08-20 RX ADMIN — PREGABALIN 75 MG: 75 CAPSULE ORAL at 08:24

## 2022-08-20 RX ADMIN — IOPAMIDOL 75 ML: 755 INJECTION, SOLUTION INTRAVENOUS at 00:22

## 2022-08-20 RX ADMIN — CELECOXIB 200 MG: 200 CAPSULE ORAL at 08:30

## 2022-08-20 RX ADMIN — ENOXAPARIN SODIUM 40 MG: 100 INJECTION SUBCUTANEOUS at 08:25

## 2022-08-20 RX ADMIN — TIZANIDINE 4 MG: 4 TABLET ORAL at 21:05

## 2022-08-20 RX ADMIN — ACETAMINOPHEN 650 MG: 325 TABLET ORAL at 21:07

## 2022-08-20 RX ADMIN — ATORVASTATIN CALCIUM 40 MG: 40 TABLET, FILM COATED ORAL at 21:05

## 2022-08-20 RX ADMIN — HYDRALAZINE HYDROCHLORIDE 5 MG: 20 INJECTION INTRAMUSCULAR; INTRAVENOUS at 19:28

## 2022-08-20 RX ADMIN — Medication 10 ML: at 10:07

## 2022-08-20 RX ADMIN — PREGABALIN 75 MG: 75 CAPSULE ORAL at 21:05

## 2022-08-20 RX ADMIN — FLECAINIDE ACETATE 50 MG: 50 TABLET ORAL at 08:24

## 2022-08-20 RX ADMIN — FLECAINIDE ACETATE 50 MG: 50 TABLET ORAL at 21:06

## 2022-08-20 RX ADMIN — TIZANIDINE 4 MG: 4 TABLET ORAL at 08:24

## 2022-08-20 RX ADMIN — TIZANIDINE 4 MG: 4 TABLET ORAL at 13:00

## 2022-08-20 RX ADMIN — OXYCODONE AND ACETAMINOPHEN 1 TABLET: 5; 325 TABLET ORAL at 19:27

## 2022-08-20 RX ADMIN — PREGABALIN 75 MG: 75 CAPSULE ORAL at 13:00

## 2022-08-20 ASSESSMENT — PAIN SCALES - GENERAL
PAINLEVEL_OUTOF10: 2
PAINLEVEL_OUTOF10: 4
PAINLEVEL_OUTOF10: 6
PAINLEVEL_OUTOF10: 6
PAINLEVEL_OUTOF10: 7
PAINLEVEL_OUTOF10: 7
PAINLEVEL_OUTOF10: 6
PAINLEVEL_OUTOF10: 3

## 2022-08-20 ASSESSMENT — PAIN DESCRIPTION - PAIN TYPE
TYPE: CHRONIC PAIN

## 2022-08-20 ASSESSMENT — PAIN DESCRIPTION - DESCRIPTORS
DESCRIPTORS: ACHING
DESCRIPTORS: DISCOMFORT
DESCRIPTORS: DISCOMFORT

## 2022-08-20 ASSESSMENT — PAIN DESCRIPTION - LOCATION
LOCATION: BACK
LOCATION: ABDOMEN
LOCATION: BACK
LOCATION: BACK

## 2022-08-20 ASSESSMENT — PAIN DESCRIPTION - ORIENTATION
ORIENTATION: LOWER
ORIENTATION: LOWER
ORIENTATION: MID

## 2022-08-20 ASSESSMENT — PAIN DESCRIPTION - FREQUENCY
FREQUENCY: CONTINUOUS
FREQUENCY: CONTINUOUS

## 2022-08-20 NOTE — PROGRESS NOTES
[facility] PROGRESS NOTE    8/20/2022 7:39 AM        Name: Keo Stauffer . Admitted: 8/19/2022  Primary Care Provider: Kamlesh Malin MD (Tel: [phone])      Subjective:  .     Had recent stress test at [facility] which was negative  BP was low and meds adjusted    Had shortness of breath with walking in a parking lot    Seen with wife at bedside  He is fully alert and pleasant  Feels well while resting in the bed  Cardiology in to evaluate         Reviewed interval ancillary notes    Current Medications  aspirin chewable tablet 324 mg, Once  furosemide (LASIX) 10 MG/ML injection,   amitriptyline (ELAVIL) tablet 75 mg, Nightly PRN  aspirin EC tablet 81 mg, Daily  flecainide (TAMBOCOR) tablet 50 mg, 2 times per day  celecoxib (CELEBREX) capsule 200 mg, BID  oxyCODONE-acetaminophen (PERCOCET) 5-325 MG per tablet 1 tablet, Q8H PRN  pregabalin (LYRICA) capsule 75 mg, TID  tiZANidine (ZANAFLEX) tablet 4 mg, TID  zolpidem (AMBIEN) tablet 5 mg, Nightly PRN  sodium chloride flush 0.9 % injection 5-40 mL, 2 times per day  sodium chloride flush 0.9 % injection 5-40 mL, PRN  0.9 % sodium chloride infusion, PRN  ondansetron (ZOFRAN-ODT) disintegrating tablet 4 mg, Q8H PRN   Or  ondansetron (ZOFRAN) injection 4 mg, Q6H PRN  acetaminophen (TYLENOL) tablet 650 mg, Q6H PRN   Or  acetaminophen (TYLENOL) suppository 650 mg, Q6H PRN  polyethylene glycol (GLYCOLAX) packet 17 g, Daily PRN  atorvastatin (LIPITOR) tablet 40 mg, Nightly  enoxaparin (LOVENOX) injection 40 mg, Daily  dextrose bolus 10% 125 mL, PRN   Or  dextrose bolus 10% 250 mL, PRN  glucagon (rDNA) injection 1 mg, PRN  dextrose 10 % infusion, Continuous PRN  insulin lispro (HUMALOG) injection vial 0-4 Units, Q4H        Objective:  BP (!) 174/78   Pulse 62   Temp 97.8 °F (36.6 °C) (Oral)   Resp 16   Ht 5' 8\" (1.727 m)   Wt 251 lb 6.4 oz (114 kg)   SpO2 92%   BMI 38.23 kg/m²     Intake/Output Summary (Last 24 hours) at 8/20/2022 0739  Last data filed at 8/20/2022 0650  Gross per 24 hour   Intake --   Output 200 ml   Net -200 ml      Wt Readings from Last 3 Encounters:   08/20/22 251 lb 6.4 oz (114 kg)   08/06/22 249 lb 12.5 oz (113.3 kg)   02/02/22 253 lb (114.8 kg)       General appearance:  Appears comfortable  Eyes: Sclera clear. Pupils equal.  ENT: Moist oral mucosa. Trachea midline, no adenopathy. Cardiovascular: Regular rhythm, normal S1, S2. No murmur. No edema in lower extremities  Respiratory: Not using accessory muscles. Good inspiratory effort. Clear to auscultation bilaterally, no wheeze or crackles. GI: Abdomen soft, no tenderness, not distended, normal bowel sounds  Musculoskeletal: No cyanosis in digits, neck supple  Neurology: CN 2-12 grossly intact. No speech or motor deficits  Psych: Normal affect. Alert and oriented in time, place and person  Skin: Warm, dry, normal turgor    Labs and Tests:  CBC:   Recent Labs     08/19/22  2346   WBC 4.4   HGB 9.6*        BMP:    Recent Labs     08/19/22 2346      K 3.8      CO2 26   BUN 15   CREATININE 0.9   GLUCOSE 113*     Hepatic:   Recent Labs     08/19/22 2346   AST 52*   ALT 43*   BILITOT 0.8   ALKPHOS 80       CTPA   No pulmonary emboli. 2. Pulmonary arterial hypertension. 3. Atherosclerotic disease with coronary artery involvement. 4. Atelectasis, though no focal consolidation or pleural effusion. 5. There are a couple small pulmonary nodules, the largest of which measuring   5 mm in size. Recommend follow up based on criteria as below. 6. Hepatic steatosis. RECOMMENDATIONS:   Multiple pulmonary nodules. Most severe: 5 mm left solid pulmonary nodule   within the upper lobe.  If patient is low risk for malignancy, no routine   follow-up imaging is recommended; if patient is high risk for malignancy, a   non-contrast Chest CT at 12 months is optional. If performed and the nodule   is stable at 12 months, no further follow-up is recommended. These guidelines do not apply to immunocompromised patients and patients with   cancer. Follow up in patients with significant comorbidities as clinically   warranted. For lung cancer screening, adhere to Lung-RADS guidelines. Reference: Radiology. 2017; 284(1):228-43. Problem List  Principal Problem:    Dyspnea on exertion  Resolved Problems:    * No resolved hospital problems. *       Assessment & Plan:   Dyspnea with risk factors for CAD:   anticipate LHC on Monday, on asa and statin therapy . Echo added   HTN:   bp creeping up , prn hydralazine added. He is on low dose BB. AF:  rate controlled , on tambicor AC  with eliquis on hold per cardiology for [facility]  Intermittent confusion:  CT head,   pt is currently fully alert and oriented   Lung nodule:  will need follow up imagine  T2DM: A1C:  5.5       Diet: ADULT DIET; Regular; 4 carb choices (60 gm/meal);  No Added Salt (3-4 gm)  Code:Full Code  DVT PPX      RHYS Henley - CNP   8/20/2022 7:39 AM

## 2022-08-20 NOTE — ED NOTES
Report called to RN. Pt taken up on ED monitors. Pt's wife taken up to room with patient. Pt's wife assumed responsibility of all pt belongings.       Aria Mcneil RN  08/20/22 6983

## 2022-08-20 NOTE — CONSULTS
[address]  H+P  Consult  OP Visit  FU Visit   CC HPI   SOB Admitted sob intermittent for several weeks. SOB worse with exertion. No cp. Wife notes intermittent confusion as well. Patient recently admitted [facility] with cp. Strong family hx, dm, htn, chol. Concern for false negative stress recently and cardiology consulted. Trop 0.03, 0.03; Hgb 9.6  EKG - AF   HISTORY/ALLERGY/ROS   MEDHx  has a past medical history of Arthritis, Chronic back pain, Diabetes mellitus ([address] Fracture of nasal bone, Headache, Hearing loss, High blood pressure, Hyperlipidemia, and Wears partial dentures. SURGHx  has a past surgical history that includes hip surgery (04/2008); joint replacement (2009, 2006, 2003, 1997); Appendectomy; Carpal tunnel release (Bilateral); back surgery (10/2007   1979  1980); knee surgery (Left); Hip Arthroplasty (Left, 04/04/2017); sinus surgery; bronchoscopy; Esophagus surgery; Colonoscopy; and Shoulder arthroscopy (Left, 7/26/2019). SOCHx  reports that he has never smoked. He has never used smokeless tobacco. He reports that he does not drink alcohol and does not use drugs. FAMHx family history includes Diabetes in his father and mother; Heart Disease in his father and mother; High Blood Pressure in his father and mother.    ALLERG Prochlorperazine and Prednisone   ROS Full ROS obtained and negative except as mentioned in HPI   MEDICATIONS   Current Facility-Administered Medications   Medication Dose Route Frequency Provider Last Rate Last Admin    aspirin chewable tablet 324 mg  324 mg Oral Once Gomez Paris MD        furosemide (LASIX) 10 MG/ML injection     Gomez Paris MD        amitriptyline (ELAVIL) tablet 75 mg  75 mg Oral Nightly PRN Gomez Paris MD        aspirin EC tablet 81 mg  81 mg Oral Daily Gomez Paris MD   81 mg at 08/20/22 7583    flecainide (TAMBOCOR) tablet 50 mg  50 mg Oral 2 times per day Gomez Paris MD   50 mg at 08/20/22 0924    celecoxib (CELEBREX) capsule 200 mg  200 mg Oral BID Emanuel Fletcher MD   200 mg at 08/20/22 0830    oxyCODONE-acetaminophen (PERCOCET) 5-325 MG per tablet 1 tablet  1 tablet Oral Q8H PRN Emanuel Fletcher MD   1 tablet at 08/20/22 8743    pregabalin (LYRICA) capsule 75 mg  75 mg Oral TID Emanuel Fletcher MD   75 mg at 08/20/22 8626    tiZANidine (ZANAFLEX) tablet 4 mg  4 mg Oral TID Emanuel Fletcher MD   4 mg at 08/20/22 1132    zolpidem (AMBIEN) tablet 5 mg  5 mg Oral Nightly PRN Emanuel Fletcher MD        sodium chloride flush 0.9 % injection 5-40 mL  5-40 mL IntraVENous 2 times per day Emanuel Fletcher MD        sodium chloride flush 0.9 % injection 5-40 mL  5-40 mL IntraVENous PRN Emanuel Fletcher MD        0.9 % sodium chloride infusion   IntraVENous PRN Emanuel Fletcher MD        ondansetron (ZOFRAN-ODT) disintegrating tablet 4 mg  4 mg Oral Q8H PRN Emanuel Fletcher MD        Or    ondansetron [facility]) injection 4 mg  4 mg IntraVENous Q6H PRN Emanuel Fletcher MD        acetaminophen (TYLENOL) tablet 650 mg  650 mg Oral Q6H PRN Emanuel Fletcher MD        Or    acetaminophen (TYLENOL) suppository 650 mg  650 mg Rectal Q6H PRN Emanuel Fletcher MD        polyethylene glycol [facility]) packet 17 g  17 g Oral Daily PRN Emanuel Fletcher MD        atorvastatin (LIPITOR) tablet 40 mg  40 mg Oral Nightly Emanuel Fletcher MD        enoxaparin (LOVENOX) injection 40 mg  40 mg SubCUTAneous Daily Emanuel Fletcher MD   40 mg at 08/20/22 0825    dextrose bolus 10% 125 mL  125 mL IntraVENous PRN Emanuel Fletcher MD        Or    dextrose bolus 10% 250 mL  250 mL IntraVENous PRN Emanuel Fletcher MD        glucagon (rDNA) injection 1 mg  1 mg SubCUTAneous PRN Emanuel Fletcher MD        dextrose 10 % infusion   IntraVENous Continuous PRN Emanuel Fletcher MD        insulin lispro (HUMALOG) injection vial 0-4 Units  0-4 Units SubCUTAneous Q4H Emanuel Fletcher MD          PHYSICAL EXAM   Vitals Vitals:    08/20/22 0735   BP: (!) 174/78   Pulse: 62   Resp: 16   Temp: 97.8 °F (36.6 °C)   SpO2: 92%      Gen Alert, coop, no distress Heart  Irreg irreg   Head NC, AT, no abnorm Abd  Soft, NT, +BS, no mass, no OM   Eyes PER, conj/corn clear Ext  Ext nl, AT, no C/C/E   Nose Nares nl, no drain, NT Pulse 2+ and symmetric   Throat Lips, mucosa, tongue nl Skin Col/text/turg nl, no vis rash/les   Neck S/S, TM, NT, no bruit/JVD Psych Nl mood and affect   Lung CTA-B, unlabored, no DTP Lymph   No cervical or axillary LA   Ch wall NT, no deform Neuro  Nl gross M/S exam      ASSESSMENT AND PLAN   *CAD   Date EF Results   Sx   Cp, sob, fatigue   Data   Most recent EKG, ECHO and [facility] reviewed personally   [facility]   None   MPI 8/22 NL Normal perfusion   TTE   No recent   Plan   Echo  [facility] Monday, R/B/O discussed   *AFIB  Status New dx at [facility], flecainide per dr cuellar   Plan On eliquis outpatient, HOLD for [facility] monday

## 2022-08-20 NOTE — CARE COORDINATION
Discharge Planning:     (CM) reviewed the patient's chart to assess needs. Patient's Readmission Risk Score is 16%. Patient's medical insurance is [employer]. Patient's PCP is Dr. Tonya Rangel. No needs anticipated, at this time. CM team to follow. Staff to inform CM if additional discharge needs arise.         Clayton SHERIDAN, NATHANAEL, [facility] [place] -   [phone]    Electronically signed by FATIMAH Daniels on 8/20/2022 at 4:17 PM

## 2022-08-20 NOTE — ED PROVIDER NOTES
[facility]        Pt Name: Danay Gautam  MRN: [id number]  [place] 1955  Date of evaluation: 8/19/2022  Provider: Anand Montelongo PA-C  PCP: Marcella Cavanaugh MD  Note Started: 11:55 PM EDT       BRUCE. I have evaluated this patient. My supervising physician was available for consultation. CHIEF COMPLAINT       Chief Complaint   Patient presents with    Shortness of Breath     Pt walked from car to [employer] [place] and felt SOB. This resolved after sitting for the [employer] game. When the pt ambulated back to the car he felt SOB again and decided to come get checked out. Pt recently diagnosed with afib. Pt arrives in afib. HISTORY OF PRESENT ILLNESS   (Location, Timing/Onset, Context/Setting, Quality, Duration, Modifying Factors, Severity, Associated Signs and Symptoms)  Note limiting factors. Chief Complaint: Shortness of breath    Danay Gautam is a 69 y.o. male who presents to the emergency department today for evaluation for shortness of breath. Patient has a history of hyperlipidemia, diabetes. The patient was recently admitted at [facility] at the beginning of the month for chest pain. Was found to be in new onset atrial fibrillation. He is anticoagulated on Eliquis. The patient states that he has been doing well at home. He tells me that he went to his [employer] football game tonight, he states that he was walking from the car to the stadium, and felt short of breath. He states that once he sat down and rested, he states that he did feel better. The patient states that he felt fine at the game, and from walking from the stadium to the car he had increasing shortness of breath. The patient states that he is asymptomatic at this time he states he just wanted to come to get checked out. Patient has no chest pain or chest tightness. Patient has atrial fibrillation when he arrives. He has no fever or chills.   He has no cough or congestion. He denies any abdominal pain, nausea, vomiting or diarrhea. He has no urinary symptoms, patient otherwise has no other complaints    Nursing Notes were all reviewed and agreed with or any disagreements were addressed in the HPI. REVIEW OF SYSTEMS    (2-9 systems for level 4, 10 or more for level 5)     Review of Systems   Constitutional:  Negative for activity change, appetite change, chills and fever. HENT:  Negative for congestion and rhinorrhea. Respiratory:  Positive for shortness of breath. Negative for cough. Cardiovascular:  Negative for chest pain. Gastrointestinal:  Negative for abdominal pain, diarrhea, nausea and vomiting. Genitourinary:  Negative for difficulty urinating, dysuria and hematuria. Positives and Pertinent negatives as per HPI. Except as noted above in the ROS, all other systems were reviewed and negative.        PAST MEDICAL HISTORY     Past Medical History:   Diagnosis Date    Arthritis     Chronic back pain     Diabetes mellitus ([facility] [address]     Fracture of nasal bone     Headache     Hearing loss     High blood pressure     Hyperlipidemia     Wears partial dentures          SURGICAL HISTORY     Past Surgical History:   Procedure Laterality Date    APPENDECTOMY      BACK SURGERY  10/2007   1979  1980    BRONCHOSCOPY      CARPAL TUNNEL RELEASE Bilateral     COLONOSCOPY      ESOPHAGUS SURGERY      HIP ARTHROPLASTY Left 04/04/2017    HIP SURGERY  04/2008    bursa removed    JOINT REPLACEMENT  2009, 2006, 2003, 1997    Left knee replacement    KNEE SURGERY Left     [id number] scopes    SHOULDER ARTHROSCOPY Left 7/26/2019    LEFT SHOULDER ARTHROSCOPY, SUBACROMIAL DECOMPRESSION, ROTATOR CUFF REPAIR performed by Alma Garcia MD at [address]       Previous Medications    AMITRIPTYLINE (ELAVIL) 75 MG TABLET    Take 75 mg by mouth nightly as needed     APIXABAN (ELIQUIS) 5 MG TABS TABLET    Take 1 tablet by mouth in the morning and 1 tablet before bedtime. ASPIRIN 81 MG EC TABLET    Take 81 mg by mouth    ATORVASTATIN (LIPITOR) 20 MG TABLET    Take 20 mg by mouth nightly     FLECAINIDE (TAMBOCOR) 50 MG TABLET    Take 1 tablet by mouth in the morning and 1 tablet before bedtime. GLIPIZIDE (GLUCOTROL) 5 MG TABLET    Take 5 mg by mouth daily as needed (BG > 180)    METOPROLOL SUCCINATE (TOPROL XL) 50 MG EXTENDED RELEASE TABLET    Take 1 tablet by mouth in the morning. MULTIPLE VITAMIN PO    Take by mouth    MULTIPLE VITAMINS-MINERALS (CENTRUM SILVER PO)    Take 1 tablet by mouth daily    NABUMETONE (RELAFEN) 750 MG TABLET    Take 750 mg by mouth in the morning and 750 mg before bedtime. OXYCODONE-ACETAMINOPHEN (PERCOCET) 5-325 MG PER TABLET    Take 1 tablet by mouth every 8 hours as needed for Pain. PREGABALIN (LYRICA) 75 MG CAPSULE    Take 75 mg by mouth in the morning and 75 mg at noon and 75 mg before bedtime. TIZANIDINE (ZANAFLEX) 4 MG TABLET    Take 4 mg by mouth in the morning and 4 mg at noon and 4 mg before bedtime.     ZOLPIDEM (AMBIEN) 5 MG TABLET    Take 5 mg by mouth nightly as needed          ALLERGIES     Prochlorperazine and Prednisone    FAMILYHISTORY       Family History   Problem Relation Age of Onset    Heart Disease Mother     High Blood Pressure Mother     Diabetes Mother     Heart Disease Father     High Blood Pressure Father     Diabetes Father           SOCIAL HISTORY       Social History     Tobacco Use    Smoking status: Never    Smokeless tobacco: Never   Vaping Use    Vaping Use: Never used   Substance Use Topics    Alcohol use: No    Drug use: No       SCREENINGS    Aron Coma Scale  Eye Opening: Spontaneous  Best Verbal Response: Oriented  Best Motor Response: Obeys commands  [place] Coma Scale Score: 15        PHYSICAL EXAM    (up to 7 for level 4, 8 or more for level 5)     ED Triage Vitals [08/19/22 2330]   BP Temp Temp src Heart Rate Resp SpO2 Height Weight   (!) 152/85 -- -- 67 15 99 % -- --       Physical Exam  Vitals and nursing note reviewed. Constitutional:       Appearance: He is well-developed. He is not diaphoretic. HENT:      Head: Normocephalic and atraumatic. Right Ear: External ear normal.      Left Ear: External ear normal.      Nose: Nose normal.   Eyes:      General:         Right eye: No discharge. Left eye: No discharge. Neck:      Trachea: No tracheal deviation. Cardiovascular:      Rate and Rhythm: Normal rate. Rhythm irregular. Heart sounds: No murmur heard. Comments: No lower leg edema  Pulmonary:      Effort: Pulmonary effort is normal. No respiratory distress. Breath sounds: Normal breath sounds. No wheezing or rales. Abdominal:      General: Bowel sounds are normal. There is no distension. Palpations: Abdomen is soft. Tenderness: There is no abdominal tenderness. There is no guarding. Musculoskeletal:         General: Normal range of motion. Cervical back: Normal range of motion and neck supple. Skin:     General: Skin is warm and dry. Neurological:      Mental Status: He is alert and oriented to person, place, and time.    Psychiatric:         Behavior: Behavior normal.       DIAGNOSTIC RESULTS   LABS:    Labs Reviewed   CBC WITH AUTO DIFFERENTIAL - Abnormal; Notable for the following components:       Result Value    RBC 2.88 (*)     Hemoglobin 9.6 (*)     Hematocrit 29.2 (*)     .5 (*)     Lymphocytes Absolute 0.9 (*)     All other components within normal limits   COMPREHENSIVE METABOLIC PANEL - Abnormal; Notable for the following components:    Glucose 113 (*)     Total Protein 6.1 (*)     ALT 43 (*)     AST 52 (*)     All other components within normal limits   LIPASE - Abnormal; Notable for the following components:    Lipase 11.0 (*)     All other components within normal limits   TROPONIN - Abnormal; Notable for the following components:    Troponin 0.03 (*)     All other components within normal limits   BRAIN NATRIURETIC PEPTIDE       When ordered only abnormal lab results are displayed. All other labs were within normal range or not returned as of this dictation. EKG: When ordered, EKG's are interpreted by the Emergency Department Physician in the absence of a cardiologist.  Please see their note for interpretation of EKG. RADIOLOGY:   Non-plain film images such as CT, Ultrasound and MRI are read by the radiologist. Plain radiographic images are visualized and preliminarily interpreted by the ED Provider with the below findings:        Interpretation per the Radiologist below, if available at the time of this note:    CT CHEST PULMONARY EMBOLISM W CONTRAST   Final Result   1. No pulmonary emboli. 2. Pulmonary arterial hypertension. 3. Atherosclerotic disease with coronary artery involvement. 4. Atelectasis, though no focal consolidation or pleural effusion. 5. There are a couple small pulmonary nodules, the largest of which measuring   5 mm in size. Recommend follow up based on criteria as below. 6. Hepatic steatosis. RECOMMENDATIONS:   Multiple pulmonary nodules. Most severe: 5 mm left solid pulmonary nodule   within the upper lobe. If patient is low risk for malignancy, no routine   follow-up imaging is recommended; if patient is high risk for malignancy, a   non-contrast Chest CT at 12 months is optional. If performed and the nodule   is stable at 12 months, no further follow-up is recommended. These guidelines do not apply to immunocompromised patients and patients with   cancer. Follow up in patients with significant comorbidities as clinically   warranted. For lung cancer screening, adhere to Lung-RADS guidelines. Reference: Radiology. 2017; 284(1):228-43. No results found.         PROCEDURES   Unless otherwise noted below, none     Procedures    CRITICAL CARE TIME       CONSULTS:  None      EMERGENCY DEPARTMENT COURSE and DIFFERENTIAL DIAGNOSIS/MDM:   Vitals: Vitals:    08/19/22 2330 08/19/22 2345 08/20/22 0000   BP: (!) 152/85 136/65 (!) 145/67   Pulse: 67 65 64   Resp: 15 16 14   SpO2: 99% 93% 92%       Patient was given the following medications:  Medications   aspirin chewable tablet 324 mg (has no administration in time range)   furosemide (LASIX) 10 MG/ML injection (has no administration in time range)   iopamidol (ISOVUE-370) 76 % injection 75 mL (75 mLs IntraVENous Given 8/20/22 0022)         Is this patient to be included in the SEP-1 Core Measure due to severe sepsis or septic shock? No   Exclusion criteria - the patient is NOT to be included for SEP-1 Core Measure due to: Infection is not suspected    Briefly, this is a 42-year-old male who presents to the emergency department today for evaluation for shortness of breath. The patient states that he went to his [employer] football game, and upon walking from the car to the stadium and stated to the car he felt short of breath, never had chest pain or chest tightness. Patient is asymptomatic at this time. Patient was recently admitted to [address] [place] [address] for chest pain. Was found to be in A. fib at that time, he is anticoagulated on Eliquis. He had a normal echocardiogram and normal cardiac work-up there. Physical exam he is in A. fib, otherwise is unremarkable    EKG will be documented by my attending, please see his note for further details. CBC shows no evidence of leukocytosis, he is anemic. CMP unremarkable. Troponin 0.03. Will give aspirin, he is already on Eliquis, he will need to be admitted for further care and evaluation. Hospitalist has been paged for admission, patient is updated, agreeable with plan, and stable for admission. FINAL IMPRESSION      1. SOTO (dyspnea on exertion)    2. Elevated troponin          DISPOSITION/PLAN   DISPOSITION Decision To Admit 08/20/2022 12:58:59 AM      PATIENT REFERRED TO:  No follow-up provider specified.     DISCHARGE MEDICATIONS:  New Prescriptions No medications on file       DISCONTINUED MEDICATIONS:  Discontinued Medications    No medications on file              (Please note that portions of this note were completed with a voice recognition program.  Efforts were made to edit the dictations but occasionally words are mis-transcribed.)    Graydon Soulier, PA-C (electronically signed)              Graydon Soulier, PA-C  08/20/22 5673

## 2022-08-20 NOTE — PROGRESS NOTES
Patient admitted to [phone]. Patient is alert and oriented. Admission completed, vital signs WNL, call light within reached. Patient complain of back pain, rates pain 7/10. Wife at bedside.  Will continue to monitor

## 2022-08-20 NOTE — H&P
Hospital Medicine History & Physical      PCP: Freddie Becerra MD    Date of Admission: 8/19/2022    Date of Service: Pt seen/examined on 8/20/2022 and Admitted to Inpatient with expected LOS greater than two midnights due to medical therapy. Chief Complaint: Shortness of breath on walking from car to the stadium, intermittent episodes of confusion at home      History Of Present Illness:   69 y.o. male [place]  With history of diabetes, hypertension, chronic pain syndrome,who was recently admitted to [facility] [place] for hypotension, found to have new onset of atrial fibrillation, hospital course complicated by hypoglycemia episodes. For chest pain work up had Stress imaging done which was negative. Echo showed preserved EF. Discharged home on apixaban for anticoagulation. But per wife has been intermittently confused for the last two weeks since Hospitalist Nurse, patient went for a game today where it was noted by his son that hes getting short of breath walking from the car to the stadium, this is unusual for him, he was able to see the game enjoy it again walking back from the state of the car made him winded. Im reaching home blood pressure was checked on the left it was 205/107 and right was 195/105. What elevated blood pressure has brought to the emergency room for for the evaluation. On my evaluation in the ED my wife president bedside was able to provide me history. Shes concerned that patient has been confused and also talked to Primary Care Physician who is planning to do some stroke work up.     Past Medical History:          Diagnosis Date    Arthritis     Chronic back pain     Diabetes mellitus ([address]     Fracture of nasal bone     Headache     Hearing loss     High blood pressure     Hyperlipidemia     Wears partial dentures        Past Surgical History:          Procedure Laterality Date    APPENDECTOMY      BACK SURGERY  10/2007   [place] CARPAL TUNNEL RELEASE Bilateral     COLONOSCOPY      ESOPHAGUS SURGERY      HIP ARTHROPLASTY Left 04/04/2017    HIP SURGERY  04/2008    bursa removed    JOINT REPLACEMENT  2009, 2006, 2003, 1997    Left knee replacement    KNEE SURGERY Left     [id number] scopes    SHOULDER ARTHROSCOPY Left 7/26/2019    LEFT SHOULDER ARTHROSCOPY, SUBACROMIAL DECOMPRESSION, ROTATOR CUFF REPAIR performed by Carla Reina MD at [address]         Medications Prior to Admission:      Prior to Admission medications    Medication Sig Start Date End Date Taking? Authorizing Provider   flecainide (TAMBOCOR) 50 MG tablet Take 1 tablet by mouth in the morning and 1 tablet before bedtime. 8/6/22   Kamlesh Malin MD   metoprolol succinate (TOPROL XL) 50 MG extended release tablet Take 1 tablet by mouth in the morning. 8/6/22   Kamlesh Malin MD   apixaban (ELIQUIS) 5 MG TABS tablet Take 1 tablet by mouth in the morning and 1 tablet before bedtime. 8/6/22   Kamlesh Malin MD   pregabalin (LYRICA) 75 MG capsule Take 75 mg by mouth in the morning and 75 mg at noon and 75 mg before bedtime. 1/3/22   Historical Provider, MD   MULTIPLE VITAMIN PO Take by mouth    Historical Provider, MD   aspirin 81 MG EC tablet Take 81 mg by mouth    Historical Provider, MD   glipiZIDE (GLUCOTROL) 5 MG tablet Take 5 mg by mouth daily as needed (BG > 180)  Patient not taking: Reported on 8/20/2022 12/1/21   Historical Provider, MD   nabumetone (RELAFEN) 750 MG tablet Take 750 mg by mouth in the morning and 750 mg before bedtime. 12/3/21   Historical Provider, MD   tiZANidine (ZANAFLEX) 4 MG tablet Take 4 mg by mouth in the morning and 4 mg at noon and 4 mg before bedtime. 5/29/20   Historical Provider, MD   oxyCODONE-acetaminophen (PERCOCET) 5-325 MG per tablet Take 1 tablet by mouth every 8 hours as needed for Pain.     Historical Provider, MD   amitriptyline (ELAVIL) 75 MG tablet Take 75 mg by mouth nightly as needed  3/15/17 Historical Provider, MD   zolpidem (AMBIEN) 5 MG tablet Take 5 mg by mouth nightly as needed  3/12/13   Historical Provider, MD   metformin (GLUCOPHAGE) 500 MG tablet Take 500 mg by mouth in the morning and 500 mg in the evening. Take with meals. 2/14/13 8/6/22  Historical Provider, MD   Multiple Vitamins-Minerals (CENTRUM SILVER PO) Take 1 tablet by mouth daily    Historical Provider, MD   atorvastatin (LIPITOR) 20 MG tablet Take 20 mg by mouth nightly     Historical Provider, MD       Allergies:  Prochlorperazine and Prednisone    Social History:      The patient currently lives at home with family    TOBACCO:   reports that he has never smoked. He has never used smokeless tobacco.  ETOH:   reports no history of alcohol use. Family History:    Positive family history of both mother and father with heart disease        Problem Relation Age of Onset    Heart Disease Mother     High Blood Pressure Mother     Diabetes Mother     Heart Disease Father     High Blood Pressure Father     Diabetes Father        REVIEW OF SYSTEMS:   Pertinent positives as noted in the HPI. All other systems reviewed and negative. PHYSICAL EXAM PERFORMED:    BP (!) 161/92   Pulse 65   Temp 98.2 °F (36.8 °C) (Oral)   Resp 16   Ht 5' 8\" (1.727 m)   Wt 251 lb 6.4 oz (114 kg)   SpO2 92%   BMI 38.23 kg/m²     General appearance: No acute distress at rest  HEENT:  Normal cephalic, atraumatic without obvious deformity. Pupils equal, round, and reactive to light. Extra ocular muscles intact. Conjunctivae/corneas clear. Neck: Supple, with full range of motion. No jugular venous distention. Trachea midline. Respiratory:  Normal respiratory effort. Clear to auscultation, bilaterally without Rales/Wheezes/Rhonchi. Cardiovascular: Irregularly irregular heart rate  Abdomen: Soft, non-tender, non-distended with normal bowel sounds.   Musculoskeletal: 1+ edema bilateral lower extremity  Neurologic:  Neurovascularly intact without any focal sensory/motor deficits. Cranial nerves: II-XII intact, grossly non-focal.  Psychiatric:  Alert and oriented, thought content appropriate, normal insight  Capillary Refill: Brisk,< 3 seconds   Peripheral Pulses: +2 palpable, equal bilaterally       Labs:     Recent Labs     08/19/22 2346   WBC 4.4   HGB 9.6*   HCT 29.2*        Recent Labs     08/19/22 2346      K 3.8      CO2 26   BUN 15   CREATININE 0.9   CALCIUM 8.4     Recent Labs     08/19/22  2346   AST 52*   ALT 43*   BILITOT 0.8   ALKPHOS 80     No results for input(s): INR in the last 72 hours. Recent Labs     08/19/22  2346 08/20/22  0539   TROPONINI 0.03* 0.03*       Urinalysis:      Lab Results   Component Value Date/Time    NITRU Negative 06/06/2019 04:57 PM    WBCUA 15-20 01/03/2013 12:00 PM    BACTERIA Rare 01/03/2013 12:00 PM    RBCUA Rare 01/03/2013 12:00 PM    BLOODU Negative 06/06/2019 04:57 PM    SPECGRAV 1.011 06/06/2019 04:57 PM    GLUCOSEU Negative 06/06/2019 04:57 PM       Radiology:     CXR: I have reviewed the CXR with the following interpretation: n/a, reviewed CT imaging done in the hospital  EKG:  I have reviewed the EKG with the following interpretation: Atrial fibrillation ventricular to 64, no specific ST or T wave changes to indicate ischemia or infarction    CT CHEST PULMONARY EMBOLISM W CONTRAST   Final Result   1. No pulmonary emboli. 2. Pulmonary arterial hypertension. 3. Atherosclerotic disease with coronary artery involvement. 4. Atelectasis, though no focal consolidation or pleural effusion. 5. There are a couple small pulmonary nodules, the largest of which measuring   5 mm in size. Recommend follow up based on criteria as below. 6. Hepatic steatosis. RECOMMENDATIONS:   Multiple pulmonary nodules. Most severe: 5 mm left solid pulmonary nodule   within the upper lobe.  If patient is low risk for malignancy, no routine   follow-up imaging is recommended; if patient is high risk for malignancy, a   non-contrast Chest CT at 12 months is optional. If performed and the nodule   is stable at 12 months, no further follow-up is recommended. These guidelines do not apply to immunocompromised patients and patients with   cancer. Follow up in patients with significant comorbidities as clinically   warranted. For lung cancer screening, adhere to Lung-RADS guidelines. Reference: Radiology. 2017; 284(1):228-43. CT HEAD WO CONTRAST    (Results Pending)       ASSESSMENT/PLAN:    Active Hospital Problems    Diagnosis Date Noted    Dyspnea on exertion [R06.00] 08/20/2022     Priority: Medium     Atrial fibrillation with controlled ventricular rate  Dyspnea on exertion, concern that it is angina equilant, family hx of CAD in both parents, atherosclerosis noted on CT imaging and underlying diabetes  Chronic anticoagulation with apixaban  Obesity  Snoring, apnea episodes witness by wife  Chronic back pain  Type 2 DM, controlled with A1c 5.5    Plan  Check CT head wo contrast for further evaluation of intermittent episodes of confusion  CT head is negative can continue back on apixaban versus place patient on heparin drip  Cardiology consulted for evaluation  Trend troponin, if trending up will place on heparin gtt  Lung nodule 5 mm, low risk as no hx of smoking nor does have positive family hx, can have discussion with pcp for repeating CT imaging  Continue flecainide  Concern for volume overload we will give a dose of IV Lasix 40 mg and see response  Strict input and output      DVT Prophylaxis: Lovenox, if CT head is negative so back on apixaban or heparin drip  Diet: Diet NPO Exceptions are: Sips of Water with Meds  Code Status: Full Code    PT/OT Eval Status: Evaluate daily order if needed    Dispo - Admit as inpatient. I anticipate hospitalization spanning more than two midnights for investigation and treatment of the above medically necessary diagnoses.          Nasrin Macario MD    Thank you Miguel Mcclain

## 2022-08-20 NOTE — ED PROVIDER NOTES
I was asked for an EKG interpretation. Otherwise, I did not evaluate the patient. I was available for consultation. EKG  The Ekg interpreted by me in the absence of a cardiologist shows. atrial fibrillation with a rate of 64  Axis is   Normal  QTc is  normal  Intervals and Durations are unremarkable. No specific ST-T wave changes appreciated. No evidence of acute ischemia. Compared to prior EKG dated 8/6/2022, patient is in A. fib today. He is rate controlled today.      Samson Harding MD  08/20/22 9656

## 2022-08-21 PROBLEM — I25.10 CORONARY ARTERY DISEASE INVOLVING NATIVE HEART WITHOUT ANGINA PECTORIS: Status: ACTIVE | Noted: 2022-08-21

## 2022-08-21 PROBLEM — D64.9 ANEMIA: Status: ACTIVE | Noted: 2022-08-21

## 2022-08-21 PROBLEM — I10 PRIMARY HYPERTENSION: Status: ACTIVE | Noted: 2022-08-21

## 2022-08-21 LAB
ALBUMIN SERPL-MCNC: 3.5 G/DL (ref 3.4–5)
ANION GAP SERPL CALCULATED.3IONS-SCNC: 9 MMOL/L (ref 3–16)
BUN BLDV-MCNC: 13 MG/DL (ref 7–20)
CALCIUM SERPL-MCNC: 8.4 MG/DL (ref 8.3–10.6)
CHLORIDE BLD-SCNC: 106 MMOL/L (ref 99–110)
CO2: 27 MMOL/L (ref 21–32)
CREAT SERPL-MCNC: 0.9 MG/DL (ref 0.8–1.3)
EKG ATRIAL RATE: 63 BPM
EKG ATRIAL RATE: 71 BPM
EKG DIAGNOSIS: NORMAL
EKG DIAGNOSIS: NORMAL
EKG P AXIS: 19 DEGREES
EKG P-R INTERVAL: 208 MS
EKG Q-T INTERVAL: 408 MS
EKG Q-T INTERVAL: 428 MS
EKG QRS DURATION: 88 MS
EKG QRS DURATION: 96 MS
EKG QTC CALCULATION (BAZETT): 420 MS
EKG QTC CALCULATION (BAZETT): 462 MS
EKG R AXIS: 25 DEGREES
EKG R AXIS: 47 DEGREES
EKG T AXIS: -6 DEGREES
EKG T AXIS: 33 DEGREES
EKG VENTRICULAR RATE: 64 BPM
EKG VENTRICULAR RATE: 70 BPM
GFR AFRICAN AMERICAN: >60
GFR NON-AFRICAN AMERICAN: >60
GLUCOSE BLD-MCNC: 146 MG/DL (ref 70–99)
GLUCOSE BLD-MCNC: 149 MG/DL (ref 70–99)
GLUCOSE BLD-MCNC: 160 MG/DL (ref 70–99)
GLUCOSE BLD-MCNC: 161 MG/DL (ref 70–99)
GLUCOSE BLD-MCNC: 164 MG/DL (ref 70–99)
GLUCOSE BLD-MCNC: 173 MG/DL (ref 70–99)
HCT VFR BLD CALC: 33 % (ref 40.5–52.5)
HEMOGLOBIN: 10.6 G/DL (ref 13.5–17.5)
MCH RBC QN AUTO: 32.8 PG (ref 26–34)
MCHC RBC AUTO-ENTMCNC: 32.2 G/DL (ref 31–36)
MCV RBC AUTO: 102 FL (ref 80–100)
PDW BLD-RTO: 14.3 % (ref 12.4–15.4)
PERFORMED ON: ABNORMAL
PHOSPHORUS: 3.4 MG/DL (ref 2.5–4.9)
PLATELET # BLD: 153 K/UL (ref 135–450)
PMV BLD AUTO: 8.8 FL (ref 5–10.5)
POTASSIUM SERPL-SCNC: 3.8 MMOL/L (ref 3.5–5.1)
RBC # BLD: 3.24 M/UL (ref 4.2–5.9)
SODIUM BLD-SCNC: 142 MMOL/L (ref 136–145)
TSH SERPL DL<=0.05 MIU/L-ACNC: 1.69 UIU/ML (ref 0.27–4.2)
WBC # BLD: 3.7 K/UL (ref 4–11)

## 2022-08-21 PROCEDURE — 2060000000 HC ICU INTERMEDIATE R&B

## 2022-08-21 PROCEDURE — 93005 ELECTROCARDIOGRAM TRACING: CPT | Performed by: INTERNAL MEDICINE

## 2022-08-21 PROCEDURE — 99233 SBSQ HOSP IP/OBS HIGH 50: CPT | Performed by: CLINICAL NURSE SPECIALIST

## 2022-08-21 PROCEDURE — 93010 ELECTROCARDIOGRAM REPORT: CPT | Performed by: INTERNAL MEDICINE

## 2022-08-21 PROCEDURE — 6360000002 HC RX W HCPCS: Performed by: INTERNAL MEDICINE

## 2022-08-21 PROCEDURE — 84443 ASSAY THYROID STIM HORMONE: CPT

## 2022-08-21 PROCEDURE — 6370000000 HC RX 637 (ALT 250 FOR IP): Performed by: INTERNAL MEDICINE

## 2022-08-21 PROCEDURE — 2580000003 HC RX 258: Performed by: INTERNAL MEDICINE

## 2022-08-21 PROCEDURE — 36415 COLL VENOUS BLD VENIPUNCTURE: CPT

## 2022-08-21 PROCEDURE — 80069 RENAL FUNCTION PANEL: CPT

## 2022-08-21 PROCEDURE — 85027 COMPLETE CBC AUTOMATED: CPT

## 2022-08-21 RX ORDER — INSULIN LISPRO 100 [IU]/ML
0-4 INJECTION, SOLUTION INTRAVENOUS; SUBCUTANEOUS
Status: DISCONTINUED | OUTPATIENT
Start: 2022-08-21 | End: 2022-08-24 | Stop reason: HOSPADM

## 2022-08-21 RX ADMIN — OXYCODONE AND ACETAMINOPHEN 1 TABLET: 5; 325 TABLET ORAL at 14:25

## 2022-08-21 RX ADMIN — TIZANIDINE 4 MG: 4 TABLET ORAL at 08:40

## 2022-08-21 RX ADMIN — ZOLPIDEM TARTRATE 5 MG: 5 TABLET ORAL at 22:44

## 2022-08-21 RX ADMIN — FLECAINIDE ACETATE 50 MG: 50 TABLET ORAL at 20:28

## 2022-08-21 RX ADMIN — ENOXAPARIN SODIUM 40 MG: 100 INJECTION SUBCUTANEOUS at 08:40

## 2022-08-21 RX ADMIN — ATORVASTATIN CALCIUM 40 MG: 40 TABLET, FILM COATED ORAL at 20:28

## 2022-08-21 RX ADMIN — PREGABALIN 75 MG: 75 CAPSULE ORAL at 20:28

## 2022-08-21 RX ADMIN — Medication 10 ML: at 08:41

## 2022-08-21 RX ADMIN — TIZANIDINE 4 MG: 4 TABLET ORAL at 14:25

## 2022-08-21 RX ADMIN — OXYCODONE AND ACETAMINOPHEN 1 TABLET: 5; 325 TABLET ORAL at 22:44

## 2022-08-21 RX ADMIN — TIZANIDINE 4 MG: 4 TABLET ORAL at 20:28

## 2022-08-21 RX ADMIN — OXYCODONE AND ACETAMINOPHEN 1 TABLET: 5; 325 TABLET ORAL at 04:42

## 2022-08-21 RX ADMIN — PREGABALIN 75 MG: 75 CAPSULE ORAL at 08:40

## 2022-08-21 RX ADMIN — ASPIRIN 81 MG: 81 TABLET, COATED ORAL at 08:40

## 2022-08-21 RX ADMIN — FLECAINIDE ACETATE 50 MG: 50 TABLET ORAL at 08:40

## 2022-08-21 RX ADMIN — PREGABALIN 75 MG: 75 CAPSULE ORAL at 14:25

## 2022-08-21 RX ADMIN — Medication 10 ML: at 20:28

## 2022-08-21 ASSESSMENT — PAIN SCALES - GENERAL
PAINLEVEL_OUTOF10: 7
PAINLEVEL_OUTOF10: 3
PAINLEVEL_OUTOF10: 4
PAINLEVEL_OUTOF10: 0
PAINLEVEL_OUTOF10: 3

## 2022-08-21 ASSESSMENT — PAIN DESCRIPTION - DESCRIPTORS
DESCRIPTORS: SHARP;ACHING
DESCRIPTORS: ACHING

## 2022-08-21 ASSESSMENT — PAIN - FUNCTIONAL ASSESSMENT: PAIN_FUNCTIONAL_ASSESSMENT: ACTIVITIES ARE NOT PREVENTED

## 2022-08-21 ASSESSMENT — PAIN DESCRIPTION - LOCATION
LOCATION: BACK;HIP
LOCATION: BACK

## 2022-08-21 ASSESSMENT — PAIN DESCRIPTION - ORIENTATION
ORIENTATION: LOWER
ORIENTATION: LOWER;LEFT

## 2022-08-21 ASSESSMENT — PAIN DESCRIPTION - PAIN TYPE: TYPE: CHRONIC PAIN

## 2022-08-21 ASSESSMENT — PAIN DESCRIPTION - FREQUENCY: FREQUENCY: CONTINUOUS

## 2022-08-21 NOTE — PLAN OF CARE
Problem: Discharge Planning  Goal: Discharge to home or other facility with appropriate resources  Outcome: Progressing  Flowsheets (Taken 8/20/2022 1921)  Discharge to home or other facility with appropriate resources: Identify barriers to discharge with patient and caregiver     Problem: Safety - Adult  Goal: Free from fall injury  Outcome: Progressing     Problem: Pain  Goal: Verbalizes/displays adequate comfort level or baseline comfort level  Outcome: Progressing     Problem: Respiratory - Adult  Goal: Achieves optimal ventilation and oxygenation  Outcome: Progressing  Flowsheets (Taken 8/20/2022 1921)  Achieves optimal ventilation and oxygenation: Assess for changes in respiratory status     Problem: Cardiovascular - Adult  Goal: Maintains optimal cardiac output and hemodynamic stability  Outcome: Progressing  Goal: Absence of cardiac dysrhythmias or at baseline  Outcome: Progressing     Problem: Skin/Tissue Integrity - Adult  Goal: Skin integrity remains intact  Outcome: Progressing  Flowsheets (Taken 8/20/2022 1921)  Skin Integrity Remains Intact: Monitor for areas of redness and/or skin breakdown  Goal: Incisions, wounds, or drain sites healing without S/S of infection  Outcome: Progressing  Flowsheets (Taken 8/20/2022 1921)  Incisions, Wounds, or Drain Sites Healing Without Sign and Symptoms of Infection: ADMISSION and DAILY: Assess and document risk factors for pressure ulcer development  Goal: Oral mucous membranes remain intact  Outcome: Progressing  Flowsheets (Taken 8/20/2022 1921)  Oral Mucous Membranes Remain Intact: Assess oral mucosa and hygiene practices     Problem: Musculoskeletal - Adult  Goal: Return mobility to safest level of function  Outcome: Progressing  Flowsheets (Taken 8/20/2022 1921)  Return Mobility to Safest Level of Function: Assess patient stability and activity tolerance for standing, transferring and ambulating with or without assistive devices  Goal: Return ADL status to a safe level of function  Outcome: Progressing  Flowsheets (Taken 8/20/2022 1921)  Return ADL Status to a Safe Level of Function: Administer medication as ordered

## 2022-08-21 NOTE — PLAN OF CARE
Problem: Discharge Planning  Goal: Discharge to home or other facility with appropriate resources  8/21/2022 1025 by Elen Aguilar RN  Outcome: Progressing  Flowsheets (Taken 8/21/2022 0800)  Discharge to home or other facility with appropriate resources:   Identify barriers to discharge with patient and caregiver   Arrange for needed discharge resources and transportation as appropriate   Identify discharge learning needs (meds, wound care, etc)   Refer to discharge planning if patient needs post-hospital services based on physician order or complex needs related to functional status, cognitive ability or social support system  8/20/2022 2346 by June Nagy RN  Outcome: Progressing  Flowsheets (Taken 8/20/2022 1921)  Discharge to home or other facility with appropriate resources: Identify barriers to discharge with patient and caregiver     Problem: Safety - Adult  Goal: Free from fall injury  8/21/2022 1025 by Elen Aguilar RN  Outcome: Progressing  Flowsheets (Taken 8/21/2022 1023)  Free From Fall Injury:   Instruct family/caregiver on patient safety   Based on caregiver fall risk screen, instruct family/caregiver to ask for assistance with transferring infant if caregiver noted to have fall risk factors  8/20/2022 2346 by June Nagy RN  Outcome: Progressing     Problem: Pain  Goal: Verbalizes/displays adequate comfort level or baseline comfort level  8/21/2022 1025 by Elen Aguilar RN  Outcome: Progressing  Flowsheets (Taken 8/21/2022 1003)  Verbalizes/displays adequate comfort level or baseline comfort level:   Encourage patient to monitor pain and request assistance   Assess pain using appropriate pain scale   Administer analgesics based on type and severity of pain and evaluate response   Implement non-pharmacological measures as appropriate and evaluate response   Consider cultural and social influences on pain and pain management   Notify Licensed Independent Practitioner if interventions unsuccessful or patient reports new pain  8/20/2022 2346 by Calvin Jaime RN  Outcome: Progressing     Problem: Respiratory - Adult  Goal: Achieves optimal ventilation and oxygenation  8/21/2022 1025 by Brooksie Landau, RN  Outcome: Progressing  Flowsheets (Taken 8/21/2022 0800)  Achieves optimal ventilation and oxygenation:   Assess for changes in respiratory status   Assess for changes in mentation and behavior   Position to facilitate oxygenation and minimize respiratory effort   Oxygen supplementation based on oxygen saturation or arterial blood gases   Encourage broncho-pulmonary hygiene including cough, deep breathe, incentive spirometry   Assess and instruct to report shortness of breath or any respiratory difficulty   Respiratory therapy support as indicated  8/20/2022 2346 by Calvin Jaime RN  Outcome: Progressing  Flowsheets (Taken 8/20/2022 1921)  Achieves optimal ventilation and oxygenation: Assess for changes in respiratory status     Problem: Cardiovascular - Adult  Goal: Maintains optimal cardiac output and hemodynamic stability  8/21/2022 1025 by Brooksie Landau, RN  Outcome: Progressing  Flowsheets (Taken 8/21/2022 0800)  Maintains optimal cardiac output and hemodynamic stability:   Monitor blood pressure and heart rate   Monitor urine output and notify Licensed Independent Practitioner for values outside of normal range   Assess for signs of decreased cardiac output   Administer fluid and/or volume expanders as ordered   Administer vasoactive medications as ordered  8/20/2022 2346 by Calvin Jaime RN  Outcome: Progressing     Problem: Cardiovascular - Adult  Goal: Absence of cardiac dysrhythmias or at baseline  8/21/2022 1025 by Brooksie Landau, RN  Outcome: Progressing  Flowsheets (Taken 8/21/2022 0800)  Absence of cardiac dysrhythmias or at baseline:   Monitor cardiac rate and rhythm   Assess for signs of decreased cardiac output   Administer antiarrhythmia medication and electrolyte replacement as ordered  8/20/2022 2346 by Lucio Reyes RN  Outcome: Progressing     Problem: Skin/Tissue Integrity - Adult  Goal: Skin integrity remains intact  8/21/2022 1025 by Shen Briceno RN  Outcome: Progressing  Flowsheets (Taken 8/21/2022 0800)  Skin Integrity Remains Intact:   Monitor for areas of redness and/or skin breakdown   Assess vascular access sites hourly   Every 4-6 hours minimum: Change oxygen saturation probe site  8/20/2022 2346 by Lucio Reyes RN  Outcome: Progressing  Flowsheets (Taken 8/20/2022 1921)  Skin Integrity Remains Intact: Monitor for areas of redness and/or skin breakdown     Problem: Skin/Tissue Integrity - Adult  Goal: Incisions, wounds, or drain sites healing without S/S of infection  8/21/2022 1025 by Shen Briceno RN  Outcome: Progressing  Flowsheets (Taken 8/21/2022 0800)  Incisions, Wounds, or Drain Sites Healing Without Sign and Symptoms of Infection:   ADMISSION and DAILY: Assess and document risk factors for pressure ulcer development   TWICE DAILY: Assess and document skin integrity   Implement wound care per orders  8/20/2022 2346 by Lucio Reyes RN  Outcome: Progressing  Flowsheets (Taken 8/20/2022 1921)  Incisions, Wounds, or Drain Sites Healing Without Sign and Symptoms of Infection: ADMISSION and DAILY: Assess and document risk factors for pressure ulcer development     Problem: Skin/Tissue Integrity - Adult  Goal: Oral mucous membranes remain intact  8/21/2022 1025 by Shen Briceno RN  Outcome: Progressing  Flowsheets (Taken 8/21/2022 0800)  Oral Mucous Membranes Remain Intact:   Assess oral mucosa and hygiene practices   Implement preventative oral hygiene regimen  8/20/2022 2346 by Lucio Reyes RN  Outcome: Progressing  Flowsheets (Taken 8/20/2022 1921)  Oral Mucous Membranes Remain Intact: Assess oral mucosa and hygiene practices     Problem: Musculoskeletal - Adult  Goal: Return mobility to safest level of function  8/21/2022 1025 by Shen Briceno RN  Outcome: Progressing  Flowsheets (Taken 8/21/2022 0800)  Return Mobility to Safest Level of Function:   Assess patient stability and activity tolerance for standing, transferring and ambulating with or without assistive devices   Assist with transfers and ambulation using safe patient handling equipment as needed   Obtain physical therapy/occupational therapy consults as needed   Instruct patient/family in ordered activity level  8/20/2022 2346 by Quinn Moss RN  Outcome: Progressing  Flowsheets (Taken 8/20/2022 1921)  Return Mobility to Safest Level of Function: Assess patient stability and activity tolerance for standing, transferring and ambulating with or without assistive devices

## 2022-08-21 NOTE — PROGRESS NOTES
[address]   Daily Progress Note      Admit Date:  8/19/2022    HPI:    Mr. Jhon Suresh is a 69year old male with family/patient history of DM, hypertension, hyperlipidemia     He is admitted with several weeks of shortness of breath with exertion, no chest pain. Recent admission to [address] [place] [address],[address] with chest pain. Concern for false negative stress test.  Troponin 0.03 and Hgb 9.6. BP elevated       Subjective:  Patient is being seen for shortness of breath. There were no acute overnight cardiac events. When he was at [address]  his lisinopril/HCTZ was stopped due to creat 1. 6.  he denies any shortness of breath or chest pain today. He is NSR on the monitor  Creat 0.9 Hgb 10.6 (iron studies normal from 8/6/2022) he has had colonoscopy  BP elevated not on any medications    Objective:   /71   Pulse 74   Temp 98.2 °F (36.8 °C) (Oral)   Resp 19   Ht 5' 8\" (1.727 m)   Wt 251 lb 6.4 oz (114 kg)   SpO2 94%   BMI 38.23 kg/m²     Intake/Output Summary (Last 24 hours) at 8/21/2022 0803  Last data filed at 8/20/2022 2105  Gross per 24 hour   Intake 610 ml   Output 900 ml   Net -290 ml          Physical Exam:  General:  Awake, alert, oriented in NAD  Skin:  Warm and dry. No unusual bruising or rash  Neck:  Supple. No JVD or carotid bruit appreciated  Chest:  Normal effort.   Clear to auscultation, no wheezes/rhonchi/rales  Cardiovascular:  RRR, S1/S2, no murmur/gallop/rub  Abdomen:  Soft, nontender, +bowel sounds  Extremities:  No edema  Neurological: No focal deficits  Psychological: Normal mood and affect      Medications:    insulin lispro  0-4 Units SubCUTAneous 4x Daily AC & HS    aspirin  324 mg Oral Once    aspirin  81 mg Oral Daily    flecainide  50 mg Oral 2 times per day    pregabalin  75 mg Oral TID    tiZANidine  4 mg Oral TID    sodium chloride flush  5-40 mL IntraVENous 2 times per day    atorvastatin  40 mg Oral Nightly    enoxaparin  40 mg SubCUTAneous Daily      sodium chloride dextrose         Lab Data:  CBC:   Recent Labs     08/19/22  2346 08/21/22  0436   WBC 4.4 3.7*   HGB 9.6* 10.6*    153     BMP:    Recent Labs     08/19/22  2346 08/21/22  0436    142   K 3.8 3.8   CO2 26 27   BUN 15 13   CREATININE 0.9 0.9     INR:  No results for input(s): INR in the last 72 hours. BNP:    Recent Labs     08/19/22 2346   PROBNP 123         Diagnostics:  Echo 8/20/2022 pending    Stress test 8/5/2022  Summary  Normal myocardial perfusion study. Normal LV size and systolic function. Overall findings represent a low risk study. Assessment:    1. Dyspnea on exertion  2. CAD   3. New Atrial fib (at recent admission to [address] [place] [address],[address])  4. Anemia, not iron deficient  5. Hypertension     Plan:    Continue to hold eliquis  LHC on Monday, npo after MN  Check TSH and treat if abnormal  4. Will need to resume lisinopril after LHC tomorrow  5. Echo pending results    Discussed with patient who is agreeable with plan of care. Thank you for allowing me to participate in the care of your patient.     Che Cruz, APRN - CNS, CNS

## 2022-08-21 NOTE — PROGRESS NOTES
[facility] PROGRESS NOTE    8/21/2022 7:38 AM        Name: Darwin Borjas . Admitted: 8/19/2022  Primary Care Provider: Dwayne Monroy MD (Tel: [phone])      Subjective:  . Had recent stress test at [facility] which was negative  BP was low and meds adjusted while at [facility]     Had shortness of breath with walking in a parking lot to see his grandson play football       Seen this am while up in the chair  No reports of pain or dyspnea.    Son at bedside update given     Reviewed interval ancillary notes    Current Medications  insulin lispro (HUMALOG) injection vial 0-4 Units, 4x Daily AC & HS  aspirin chewable tablet 324 mg, Once  amitriptyline (ELAVIL) tablet 75 mg, Nightly PRN  aspirin EC tablet 81 mg, Daily  flecainide (TAMBOCOR) tablet 50 mg, 2 times per day  oxyCODONE-acetaminophen (PERCOCET) 5-325 MG per tablet 1 tablet, Q8H PRN  pregabalin (LYRICA) capsule 75 mg, TID  tiZANidine (ZANAFLEX) tablet 4 mg, TID  zolpidem (AMBIEN) tablet 5 mg, Nightly PRN  sodium chloride flush 0.9 % injection 5-40 mL, 2 times per day  sodium chloride flush 0.9 % injection 5-40 mL, PRN  0.9 % sodium chloride infusion, PRN  ondansetron (ZOFRAN-ODT) disintegrating tablet 4 mg, Q8H PRN   Or  ondansetron (ZOFRAN) injection 4 mg, Q6H PRN  acetaminophen (TYLENOL) tablet 650 mg, Q6H PRN   Or  acetaminophen (TYLENOL) suppository 650 mg, Q6H PRN  polyethylene glycol (GLYCOLAX) packet 17 g, Daily PRN  atorvastatin (LIPITOR) tablet 40 mg, Nightly  enoxaparin (LOVENOX) injection 40 mg, Daily  dextrose bolus 10% 125 mL, PRN   Or  dextrose bolus 10% 250 mL, PRN  glucagon (rDNA) injection 1 mg, PRN  dextrose 10 % infusion, Continuous PRN  perflutren lipid microspheres (DEFINITY) injection 1.65 mg, ONCE PRN  hydrALAZINE (APRESOLINE) injection 5 mg, Q6H PRN      Objective:  /71   Pulse 74   Temp 98.2 °F (36.8 °C) (Oral)   Resp 19   Ht 5' 8\" (1.727 m)   Wt 251 lb 6.4 oz (114 kg)   SpO2 94%   BMI 38.23 kg/m²     Intake/Output Summary (Last 24 hours) at 8/21/2022 0738  Last data filed at 8/20/2022 2105  Gross per 24 hour   Intake 610 ml   Output 900 ml   Net -290 ml        Wt Readings from Last 3 Encounters:   08/20/22 251 lb 6.4 oz (114 kg)   08/06/22 249 lb 12.5 oz (113.3 kg)   02/02/22 253 lb (114.8 kg)       General appearance:  Appears comfortable  Eyes: Sclera clear. Pupils equal.  ENT: Moist oral mucosa. Trachea midline, no adenopathy. Cardiovascular: Regular rhythm, normal S1, S2. No murmur. No edema in lower extremities  Respiratory: Not using accessory muscles. Good inspiratory effort. Clear to auscultation bilaterally, no wheeze or crackles. GI: Abdomen soft, no tenderness, not distended, normal bowel sounds  Musculoskeletal: No cyanosis in digits, neck supple  Neurology: CN 2-12 grossly intact. No speech or motor deficits  Psych: Normal affect. Alert and oriented in time, place and person  Skin: Warm, dry, normal turgor    Labs and Tests:  CBC:   Recent Labs     08/19/22 2346 08/21/22  0436   WBC 4.4 3.7*   HGB 9.6* 10.6*    153       BMP:    Recent Labs     08/19/22 2346 08/21/22  0436    142   K 3.8 3.8    106   CO2 26 27   BUN 15 13   CREATININE 0.9 0.9   GLUCOSE 113* 161*       Hepatic:   Recent Labs     08/19/22 2346   AST 52*   ALT 43*   BILITOT 0.8   ALKPHOS 80   Ldl 57     CTPA   No pulmonary emboli. 2. Pulmonary arterial hypertension. 3. Atherosclerotic disease with coronary artery involvement. 4. Atelectasis, though no focal consolidation or pleural effusion. 5. There are a couple small pulmonary nodules, the largest of which measuring   5 mm in size. Recommend follow up based on criteria as below. 6. Hepatic steatosis. RECOMMENDATIONS:   Multiple pulmonary nodules. Most severe: 5 mm left solid pulmonary nodule   within the upper lobe.  If patient is low risk for malignancy, no routine   follow-up imaging is recommended; if patient is high risk for malignancy, a   non-contrast Chest CT at 12 months is optional. If performed and the nodule   is stable at 12 months, no further follow-up is recommended. These guidelines do not apply to immunocompromised patients and patients with   cancer. Follow up in patients with significant comorbidities as clinically   warranted. For lung cancer screening, adhere to Lung-RADS guidelines. Reference: Radiology. 2017; 284(1):228-43. CT head:  No acute intracranial abnormality. Specifically, no evidence of acute   hemorrhage. Problem List  Principal Problem:    Dyspnea on exertion  Resolved Problems:    * No resolved hospital problems. *       Assessment & Plan:   Dyspnea with risk factors for CAD:   anticipate C on Monday, on asa and statin therapy . Echo completed but not read   HTN:   bp creeping up , prn hydralazine added. He is on low dose BB. AF:  rate controlled , on tambicor AC  with eliquis on hold per cardiology for [facility]  Intermittent confusion:  none since admission. CT head negative for any acute findings. Lung nodule:  will need follow up imaging  T2DM: A1C:  5.5       Diet: ADULT DIET; Regular; 4 carb choices (60 gm/meal);  No Added Salt (3-4 gm)  Code:Full Code  DVT PPX      Monico Ramos, RHYS - CNP   8/21/2022 7:38 AM

## 2022-08-22 LAB
GLUCOSE BLD-MCNC: 103 MG/DL (ref 70–99)
GLUCOSE BLD-MCNC: 125 MG/DL (ref 70–99)
GLUCOSE BLD-MCNC: 135 MG/DL (ref 70–99)
GLUCOSE BLD-MCNC: 144 MG/DL (ref 70–99)
GLUCOSE BLD-MCNC: 99 MG/DL (ref 70–99)
LEFT VENTRICULAR EJECTION FRACTION MODE: NORMAL
LV EF: 60 %
PERFORMED ON: ABNORMAL
PERFORMED ON: NORMAL

## 2022-08-22 PROCEDURE — 2580000003 HC RX 258: Performed by: INTERNAL MEDICINE

## 2022-08-22 PROCEDURE — 6360000002 HC RX W HCPCS

## 2022-08-22 PROCEDURE — 93005 ELECTROCARDIOGRAM TRACING: CPT | Performed by: NURSE PRACTITIONER

## 2022-08-22 PROCEDURE — 1200000000 HC SEMI PRIVATE

## 2022-08-22 PROCEDURE — C1894 INTRO/SHEATH, NON-LASER: HCPCS

## 2022-08-22 PROCEDURE — 6370000000 HC RX 637 (ALT 250 FOR IP): Performed by: INTERNAL MEDICINE

## 2022-08-22 PROCEDURE — 99152 MOD SED SAME PHYS/QHP 5/>YRS: CPT

## 2022-08-22 PROCEDURE — 99152 MOD SED SAME PHYS/QHP 5/>YRS: CPT | Performed by: INTERNAL MEDICINE

## 2022-08-22 PROCEDURE — 2580000003 HC RX 258

## 2022-08-22 PROCEDURE — 6370000000 HC RX 637 (ALT 250 FOR IP): Performed by: NURSE PRACTITIONER

## 2022-08-22 PROCEDURE — C1769 GUIDE WIRE: HCPCS

## 2022-08-22 PROCEDURE — 2709999900 HC NON-CHARGEABLE SUPPLY

## 2022-08-22 PROCEDURE — 2500000003 HC RX 250 WO HCPCS

## 2022-08-22 PROCEDURE — 93458 L HRT ARTERY/VENTRICLE ANGIO: CPT

## 2022-08-22 PROCEDURE — 6360000004 HC RX CONTRAST MEDICATION: Performed by: INTERNAL MEDICINE

## 2022-08-22 PROCEDURE — 99153 MOD SED SAME PHYS/QHP EA: CPT

## 2022-08-22 PROCEDURE — 6360000002 HC RX W HCPCS: Performed by: INTERNAL MEDICINE

## 2022-08-22 PROCEDURE — 6360000002 HC RX W HCPCS: Performed by: NURSE PRACTITIONER

## 2022-08-22 PROCEDURE — 93458 L HRT ARTERY/VENTRICLE ANGIO: CPT | Performed by: INTERNAL MEDICINE

## 2022-08-22 RX ORDER — METOPROLOL SUCCINATE 50 MG/1
50 TABLET, EXTENDED RELEASE ORAL DAILY
Status: DISCONTINUED | OUTPATIENT
Start: 2022-08-22 | End: 2022-08-23

## 2022-08-22 RX ADMIN — HYDRALAZINE HYDROCHLORIDE 5 MG: 20 INJECTION INTRAMUSCULAR; INTRAVENOUS at 03:32

## 2022-08-22 RX ADMIN — ENOXAPARIN SODIUM 40 MG: 100 INJECTION SUBCUTANEOUS at 08:43

## 2022-08-22 RX ADMIN — PREGABALIN 75 MG: 75 CAPSULE ORAL at 20:29

## 2022-08-22 RX ADMIN — PREGABALIN 75 MG: 75 CAPSULE ORAL at 08:43

## 2022-08-22 RX ADMIN — TIZANIDINE 4 MG: 4 TABLET ORAL at 20:29

## 2022-08-22 RX ADMIN — Medication 10 ML: at 09:18

## 2022-08-22 RX ADMIN — PREGABALIN 75 MG: 75 CAPSULE ORAL at 13:19

## 2022-08-22 RX ADMIN — TIZANIDINE 4 MG: 4 TABLET ORAL at 13:19

## 2022-08-22 RX ADMIN — FLECAINIDE ACETATE 50 MG: 50 TABLET ORAL at 21:57

## 2022-08-22 RX ADMIN — ASPIRIN 81 MG: 81 TABLET, COATED ORAL at 08:43

## 2022-08-22 RX ADMIN — METOPROLOL SUCCINATE 50 MG: 50 TABLET, EXTENDED RELEASE ORAL at 13:19

## 2022-08-22 RX ADMIN — OXYCODONE AND ACETAMINOPHEN 1 TABLET: 5; 325 TABLET ORAL at 20:29

## 2022-08-22 RX ADMIN — FLECAINIDE ACETATE 50 MG: 50 TABLET ORAL at 08:49

## 2022-08-22 RX ADMIN — Medication 10 ML: at 20:29

## 2022-08-22 RX ADMIN — TIZANIDINE 4 MG: 4 TABLET ORAL at 08:43

## 2022-08-22 RX ADMIN — IOPAMIDOL 57 ML: 755 INJECTION, SOLUTION INTRAVENOUS at 14:08

## 2022-08-22 RX ADMIN — ATORVASTATIN CALCIUM 40 MG: 40 TABLET, FILM COATED ORAL at 20:29

## 2022-08-22 RX ADMIN — OXYCODONE AND ACETAMINOPHEN 1 TABLET: 5; 325 TABLET ORAL at 08:43

## 2022-08-22 ASSESSMENT — PAIN SCALES - GENERAL
PAINLEVEL_OUTOF10: 0
PAINLEVEL_OUTOF10: 8
PAINLEVEL_OUTOF10: 0
PAINLEVEL_OUTOF10: 8
PAINLEVEL_OUTOF10: 8
PAINLEVEL_OUTOF10: 7

## 2022-08-22 ASSESSMENT — PAIN DESCRIPTION - LOCATION: LOCATION: BACK

## 2022-08-22 ASSESSMENT — PAIN SCALES - WONG BAKER: WONGBAKER_NUMERICALRESPONSE: 8

## 2022-08-22 ASSESSMENT — PAIN DESCRIPTION - DESCRIPTORS: DESCRIPTORS: ACHING;NAGGING

## 2022-08-22 NOTE — PROGRESS NOTES
[facility] PROGRESS NOTE    8/22/2022 7:34 AM        Name: Parris Rojo . Admitted: 8/19/2022  Primary Care Provider: Ankush Hall MD (Tel: [phone])      Subjective:  . Had recent stress test at [place] which was negative  BP was low and meds adjusted while at [place] , bp now creeping up. Pt has follow up appt with Dr Umanzor Staff on Tuesday 8/23/33    Wife at bedside and updated  Pt NPO for [facility] today.   Reports some back pain which is chronic     Reviewed interval ancillary notes    Current Medications  insulin lispro (HUMALOG) injection vial 0-4 Units, 4x Daily AC & HS  aspirin chewable tablet 324 mg, Once  amitriptyline (ELAVIL) tablet 75 mg, Nightly PRN  aspirin EC tablet 81 mg, Daily  flecainide (TAMBOCOR) tablet 50 mg, 2 times per day  oxyCODONE-acetaminophen (PERCOCET) 5-325 MG per tablet 1 tablet, Q8H PRN  pregabalin (LYRICA) capsule 75 mg, TID  tiZANidine (ZANAFLEX) tablet 4 mg, TID  zolpidem (AMBIEN) tablet 5 mg, Nightly PRN  sodium chloride flush 0.9 % injection 5-40 mL, 2 times per day  sodium chloride flush 0.9 % injection 5-40 mL, PRN  0.9 % sodium chloride infusion, PRN  ondansetron (ZOFRAN-ODT) disintegrating tablet 4 mg, Q8H PRN   Or  ondansetron (ZOFRAN) injection 4 mg, Q6H PRN  acetaminophen (TYLENOL) tablet 650 mg, Q6H PRN   Or  acetaminophen (TYLENOL) suppository 650 mg, Q6H PRN  polyethylene glycol (GLYCOLAX) packet 17 g, Daily PRN  atorvastatin (LIPITOR) tablet 40 mg, Nightly  enoxaparin (LOVENOX) injection 40 mg, Daily  dextrose bolus 10% 125 mL, PRN   Or  dextrose bolus 10% 250 mL, PRN  glucagon (rDNA) injection 1 mg, PRN  dextrose 10 % infusion, Continuous PRN  perflutren lipid microspheres (DEFINITY) injection 1.65 mg, ONCE PRN  hydrALAZINE (APRESOLINE) injection 5 mg, Q6H PRN      Objective:  BP (!) 169/78   Pulse 59   Temp 97.9 °F (36.6 °C) (Oral)   Resp 18   Ht 5' 8\" (1.727 m) Wt 251 lb 6.4 oz (114 kg)   SpO2 92%   BMI 38.23 kg/m²     Intake/Output Summary (Last 24 hours) at 8/22/2022 0734  Last data filed at 8/21/2022 2028  Gross per 24 hour   Intake 845 ml   Output --   Net 845 ml        Wt Readings from Last 3 Encounters:   08/20/22 251 lb 6.4 oz (114 kg)   08/06/22 249 lb 12.5 oz (113.3 kg)   02/02/22 253 lb (114.8 kg)       General appearance:  Appears comfortable, alert and pleasant   Eyes: Sclera clear. Pupils equal.  ENT: Moist oral mucosa. Trachea midline, no adenopathy. Cardiovascular: Regular rhythm, normal S1, S2. No murmur. No edema in lower extremities  Respiratory: Not using accessory muscles. Good inspiratory effort. Clear to auscultation bilaterally, no wheeze or crackles. GI: Abdomen soft, no tenderness, not distended, normal bowel sounds  Musculoskeletal: No cyanosis in digits, neck supple  Neurology: CN 2-12 grossly intact. No speech or motor deficits  Psych: Normal affect. Alert and oriented in time, place and person  Skin: Warm, dry, normal turgor    Labs and Tests:  CBC:   Recent Labs     08/19/22 2346 08/21/22  0436   WBC 4.4 3.7*   HGB 9.6* 10.6*    153     BMP:    Recent Labs     08/19/22 2346 08/21/22  0436    142   K 3.8 3.8    106   CO2 26 27   BUN 15 13   CREATININE 0.9 0.9   GLUCOSE 113* 161*     Hepatic:   Recent Labs     08/19/22 2346   AST 52*   ALT 43*   BILITOT 0.8   ALKPHOS 80     Ldl 57     CTPA   No pulmonary emboli. 2. Pulmonary arterial hypertension. 3. Atherosclerotic disease with coronary artery involvement. 4. Atelectasis, though no focal consolidation or pleural effusion. 5. There are a couple small pulmonary nodules, the largest of which measuring   5 mm in size. Recommend follow up based on criteria as below. 6. Hepatic steatosis. RECOMMENDATIONS:   Multiple pulmonary nodules. Most severe: 5 mm left solid pulmonary nodule   within the upper lobe.  If patient is low risk for malignancy, no routine follow-up imaging is recommended; if patient is high risk for malignancy, a   non-contrast Chest CT at 12 months is optional. If performed and the nodule   is stable at 12 months, no further follow-up is recommended. These guidelines do not apply to immunocompromised patients and patients with   cancer. Follow up in patients with significant comorbidities as clinically   warranted. For lung cancer screening, adhere to Lung-RADS guidelines. Reference: Radiology. 2017; 284(1):228-43. CT head:  No acute intracranial abnormality. Specifically, no evidence of acute   hemorrhage. Echo: 8/20/22   Summary   *Left ventricle - normal size, mild concentric LVH, normal function with EF   of 65%   *Aortic valve - sclerotic   *Tricuspid valve - trivial regurgitation with RVSP of 22mmHg          Problem List  Principal Problem:    SOTO (dyspnea on exertion)  Active Problems:    Coronary artery disease involving native heart without angina pectoris    Anemia    Primary hypertension    Atrial fibrillation ([address]  Resolved Problems:    * No resolved hospital problems. *       Assessment & Plan:   Dyspnea with risk factors for CAD:   for [facility] today. on asa and statin therapy . Echo completed but not read   HTN:   bp creeping up , prn hydralazine added. He is on low dose BB. AF:  rate controlled , on tambicor AC  with eliquis on hold per cardiology for [facility]  Intermittent confusion:  none since admission. CT head negative for any acute findings. Anemia:  iron studies are normal.  Folate and B12 also normal. TSH in range .  Need to check on C scope status ,  pt reports normal C scope in the past 5 years   Lung nodule:  will need follow up imaging  T2DM: A1C:  5.5         Diet: Diet NPO  Code:Full Code  DVT PPX      RHYS Gloria CNP   8/22/2022 7:34 AM

## 2022-08-22 NOTE — PLAN OF CARE
Problem: Safety - Adult  Goal: Free from fall injury  Outcome: Progressing  Note: Safety precautions in place. Bed locked, alarmed, lowest position. Call light in reach, educated on use. No falls or physical injury noted.      Problem: Pain  Goal: Verbalizes/displays adequate comfort level or baseline comfort level  Outcome: Progressing  Note: Pain managed with PRN pain meds per MAR     Problem: Respiratory - Adult  Goal: Achieves optimal ventilation and oxygenation  Outcome: Progressing  Note: Pt on room air with no signs of respiratory distress

## 2022-08-22 NOTE — PROGRESS NOTES
0730-Assessment/vitals complete and charted. Patient c/o pain of 8 on 0-10 scale. Patient to go to cath lab today for procedure. Patient is alert and oriented. Bed in low position, call light in reach. Patient instructed to call for assistance. 0845-Wife at bedside. Patient given percocet for pain as ordered. Will reassess for effectiveness.      1325-Patient off floor for cardiac cath    1420-Patient transferred to 3A s/p cath

## 2022-08-22 NOTE — OP NOTE
[address]   [facility] Operative Note     Procedure Summary  Procedure [facility]   Indication SOB, CAD RISKS   Consent Obtained   Access RRA   US US guidance used to determine artery patency, size (>2mm), anatomic variations and ideal puncture location. Real-time US utilized concurrent with vascular needle entry into artery. Image(s) permanently recorded and reported in chart. Bleed Risk Low   Sedation Minimal conscious sedation for patient comfort. Independent trained observer pushed medications at my direction. We monitored the patient's level of consciousness and vital signs/physiologic status throughout the procedure duration (see start and stop times above, as well as medication dosages).    Start Time 1340   Stop Time 1404   Versed 2mg   Fentanyl 100mcg   Contrast 57cc   Flouro 2.6   EBL <13FE   Complicat None   Specimens None     Findings  Artery Findings/Result   LM Normal   LAD Normal   Cx Normal   RI N/A   RCA Normal   LVEDP 21   LVG 60%     Intervention(s)  None    Post Cath Dx:   Normal coronaries

## 2022-08-22 NOTE — PRE SEDATION
[facility] Cardiology  Brief Pre-Op Note/Sedation Assessment    Sarah Dsouza  1955  [id number]  6:58 AM    Planned Procedure: Cardiac Catheterization Procedure  Post Procedure Plan: Return to same level of care  Consent:   I have discussed with the patient and/or the patient representative the indication, alternatives, and the possible risks and/or complications of the planned procedure and the anesthesia methods. The patient and/or patient representative appear to understand and agree to proceed. Chief Complaint:   Chest Pain/Pressure  Anginal Equivalent  Dyspnea on Exertion  Dyspnea  Fatigue     Indications for Procedure:  Presentation New Onset Angina <= 2 months and Worsening Angina   Anginal Class (2 wks) CCS III - Symptoms with everyday living activities, i.e., moderate limitation   Anginal Sx Typical CP   CHF Class (2wks) No symptoms   CV Instability Yes     Prior Ischemic Workup/Eval:  Pre-Proc Meds Yes: Aspirin and STATIN   Stress Test? No     Does Patient need surgery? Cardiac Valve Surgery No     Pre-Procedure Medical History:  Vital Signs BP (!) 169/78   Pulse 59   Temp 97.9 °F (36.6 °C) (Oral)   Resp 18   Ht 5' 8\" (1.727 m)   Wt 251 lb 6.4 oz (114 kg)   SpO2 92%   BMI 38.23 kg/m²    Allergies Reviewed in EMR   Medications Reviewed in EMR   Past Med Hx Reviewed in EMR   Surg Hx Reviewed in EMR     Pre-Sedation:  Pre-Sedation Exam I have assessed the patient and reviewed the H&P on the chart. Hx Anesthesia Comps None   Mod Mallampati II   ASA Class Class 2 - A normal healthy patient with mild systemic disease     Estelita Scale: Activity 2 - Able to move 4 extrem on command   Respiration 2 - Able to breath deeply and cough freely   Circulation 2 - BP +/- 20mmHg of normal   Consciousness 2 - Fully awake   Oxygen Saturation 2 - Able to maintain oxygen sat >92% on room air     Sedation/Anesthesia Plan:  Guard patient safety and welfare. Minimize physical discomfort and pain.  Minimize negative psychological responses to treatment by providing sedation and analgesia and maximize the potential amnesia. Patient to meet pre-procedure discharge plan.      Medication Planned:  Midazolam intravenously and fentanyl intravenously     Patient is an appropriate candidate for plan of sedation:   Yes    Electronically signed by Emily Hanley MD on 8/22/2022 at 6:58 AM

## 2022-08-22 NOTE — PROGRESS NOTES
Pt brought to room 3318 after cath lab (was previously on 3T). Family at bedside. R radial site WNL as well as RUE neurovascular assessment. Vitals obtained. Pt & family oriented to room and floor policies. Call light within reach. Denies needs at this time.

## 2022-08-23 LAB
ANION GAP SERPL CALCULATED.3IONS-SCNC: 10 MMOL/L (ref 3–16)
BASOPHILS ABSOLUTE: 0 K/UL (ref 0–0.2)
BASOPHILS RELATIVE PERCENT: 0.5 %
BUN BLDV-MCNC: 14 MG/DL (ref 7–20)
CALCIUM SERPL-MCNC: 9.2 MG/DL (ref 8.3–10.6)
CHLORIDE BLD-SCNC: 106 MMOL/L (ref 99–110)
CO2: 25 MMOL/L (ref 21–32)
CREAT SERPL-MCNC: 0.8 MG/DL (ref 0.8–1.3)
EKG ATRIAL RATE: 45 BPM
EKG DIAGNOSIS: NORMAL
EKG P AXIS: 19 DEGREES
EKG P-R INTERVAL: 226 MS
EKG Q-T INTERVAL: 484 MS
EKG QRS DURATION: 92 MS
EKG QTC CALCULATION (BAZETT): 418 MS
EKG R AXIS: 33 DEGREES
EKG T AXIS: 22 DEGREES
EKG VENTRICULAR RATE: 45 BPM
EOSINOPHILS ABSOLUTE: 0.2 K/UL (ref 0–0.6)
EOSINOPHILS RELATIVE PERCENT: 4.1 %
GFR AFRICAN AMERICAN: >60
GFR NON-AFRICAN AMERICAN: >60
GLUCOSE BLD-MCNC: 107 MG/DL (ref 70–99)
GLUCOSE BLD-MCNC: 107 MG/DL (ref 70–99)
GLUCOSE BLD-MCNC: 109 MG/DL (ref 70–99)
GLUCOSE BLD-MCNC: 164 MG/DL (ref 70–99)
GLUCOSE BLD-MCNC: 189 MG/DL (ref 70–99)
HCT VFR BLD CALC: 39.4 % (ref 40.5–52.5)
HEMOGLOBIN: 12 G/DL (ref 13.5–17.5)
LYMPHOCYTES ABSOLUTE: 1.5 K/UL (ref 1–5.1)
LYMPHOCYTES RELATIVE PERCENT: 32.7 %
MCH RBC QN AUTO: 33 PG (ref 26–34)
MCHC RBC AUTO-ENTMCNC: 30.5 G/DL (ref 31–36)
MCV RBC AUTO: 108.2 FL (ref 80–100)
MONOCYTES ABSOLUTE: 0.4 K/UL (ref 0–1.3)
MONOCYTES RELATIVE PERCENT: 8.9 %
NEUTROPHILS ABSOLUTE: 2.4 K/UL (ref 1.7–7.7)
NEUTROPHILS RELATIVE PERCENT: 53.8 %
PDW BLD-RTO: 14.9 % (ref 12.4–15.4)
PERFORMED ON: ABNORMAL
PLATELET # BLD: 164 K/UL (ref 135–450)
PMV BLD AUTO: 8.9 FL (ref 5–10.5)
POTASSIUM REFLEX MAGNESIUM: 4.7 MMOL/L (ref 3.5–5.1)
RBC # BLD: 3.64 M/UL (ref 4.2–5.9)
SODIUM BLD-SCNC: 141 MMOL/L (ref 136–145)
WBC # BLD: 4.6 K/UL (ref 4–11)

## 2022-08-23 PROCEDURE — 80048 BASIC METABOLIC PNL TOTAL CA: CPT

## 2022-08-23 PROCEDURE — 85025 COMPLETE CBC W/AUTO DIFF WBC: CPT

## 2022-08-23 PROCEDURE — 93010 ELECTROCARDIOGRAM REPORT: CPT | Performed by: INTERNAL MEDICINE

## 2022-08-23 PROCEDURE — 6370000000 HC RX 637 (ALT 250 FOR IP): Performed by: PHYSICIAN ASSISTANT

## 2022-08-23 PROCEDURE — 6360000002 HC RX W HCPCS: Performed by: NURSE PRACTITIONER

## 2022-08-23 PROCEDURE — 36415 COLL VENOUS BLD VENIPUNCTURE: CPT

## 2022-08-23 PROCEDURE — 6370000000 HC RX 637 (ALT 250 FOR IP): Performed by: INTERNAL MEDICINE

## 2022-08-23 PROCEDURE — 99233 SBSQ HOSP IP/OBS HIGH 50: CPT | Performed by: NURSE PRACTITIONER

## 2022-08-23 PROCEDURE — 2580000003 HC RX 258: Performed by: INTERNAL MEDICINE

## 2022-08-23 PROCEDURE — 1200000000 HC SEMI PRIVATE

## 2022-08-23 PROCEDURE — 6360000002 HC RX W HCPCS: Performed by: INTERNAL MEDICINE

## 2022-08-23 RX ORDER — CARVEDILOL 25 MG/1
25 TABLET ORAL 2 TIMES DAILY WITH MEALS
Status: DISCONTINUED | OUTPATIENT
Start: 2022-08-23 | End: 2022-08-23

## 2022-08-23 RX ORDER — AMLODIPINE BESYLATE 5 MG/1
10 TABLET ORAL DAILY
Status: DISCONTINUED | OUTPATIENT
Start: 2022-08-24 | End: 2022-08-24 | Stop reason: HOSPADM

## 2022-08-23 RX ORDER — METOPROLOL SUCCINATE 25 MG/1
25 TABLET, EXTENDED RELEASE ORAL DAILY
Status: DISCONTINUED | OUTPATIENT
Start: 2022-08-24 | End: 2022-08-23

## 2022-08-23 RX ORDER — CARVEDILOL 6.25 MG/1
12.5 TABLET ORAL 2 TIMES DAILY WITH MEALS
Status: DISCONTINUED | OUTPATIENT
Start: 2022-08-23 | End: 2022-08-23

## 2022-08-23 RX ORDER — HYDRALAZINE HYDROCHLORIDE 25 MG/1
25 TABLET, FILM COATED ORAL EVERY 8 HOURS SCHEDULED
Status: DISCONTINUED | OUTPATIENT
Start: 2022-08-23 | End: 2022-08-24

## 2022-08-23 RX ORDER — HYDRALAZINE HYDROCHLORIDE 25 MG/1
25 TABLET, FILM COATED ORAL EVERY 8 HOURS SCHEDULED
Status: DISCONTINUED | OUTPATIENT
Start: 2022-08-23 | End: 2022-08-23

## 2022-08-23 RX ORDER — AMLODIPINE BESYLATE 5 MG/1
5 TABLET ORAL ONCE
Status: COMPLETED | OUTPATIENT
Start: 2022-08-23 | End: 2022-08-23

## 2022-08-23 RX ORDER — AMLODIPINE BESYLATE 5 MG/1
5 TABLET ORAL DAILY
Status: DISCONTINUED | OUTPATIENT
Start: 2022-08-23 | End: 2022-08-23

## 2022-08-23 RX ADMIN — ASPIRIN 81 MG: 81 TABLET, COATED ORAL at 09:17

## 2022-08-23 RX ADMIN — HYDRALAZINE HYDROCHLORIDE 5 MG: 20 INJECTION INTRAMUSCULAR; INTRAVENOUS at 03:51

## 2022-08-23 RX ADMIN — PREGABALIN 75 MG: 75 CAPSULE ORAL at 14:16

## 2022-08-23 RX ADMIN — Medication 10 ML: at 09:18

## 2022-08-23 RX ADMIN — AMLODIPINE BESYLATE 5 MG: 5 TABLET ORAL at 09:17

## 2022-08-23 RX ADMIN — HYDRALAZINE HYDROCHLORIDE 25 MG: 25 TABLET, FILM COATED ORAL at 18:31

## 2022-08-23 RX ADMIN — Medication 10 ML: at 20:14

## 2022-08-23 RX ADMIN — OXYCODONE AND ACETAMINOPHEN 1 TABLET: 5; 325 TABLET ORAL at 03:47

## 2022-08-23 RX ADMIN — AMLODIPINE BESYLATE 5 MG: 5 TABLET ORAL at 10:22

## 2022-08-23 RX ADMIN — FLECAINIDE ACETATE 50 MG: 50 TABLET ORAL at 20:13

## 2022-08-23 RX ADMIN — ATORVASTATIN CALCIUM 40 MG: 40 TABLET, FILM COATED ORAL at 20:13

## 2022-08-23 RX ADMIN — OXYCODONE AND ACETAMINOPHEN 1 TABLET: 5; 325 TABLET ORAL at 20:13

## 2022-08-23 RX ADMIN — TIZANIDINE 4 MG: 4 TABLET ORAL at 20:13

## 2022-08-23 RX ADMIN — ZOLPIDEM TARTRATE 5 MG: 5 TABLET ORAL at 00:02

## 2022-08-23 RX ADMIN — PREGABALIN 75 MG: 75 CAPSULE ORAL at 09:17

## 2022-08-23 RX ADMIN — FLECAINIDE ACETATE 50 MG: 50 TABLET ORAL at 09:20

## 2022-08-23 RX ADMIN — TIZANIDINE 4 MG: 4 TABLET ORAL at 09:17

## 2022-08-23 RX ADMIN — ENOXAPARIN SODIUM 40 MG: 100 INJECTION SUBCUTANEOUS at 09:17

## 2022-08-23 RX ADMIN — PREGABALIN 75 MG: 75 CAPSULE ORAL at 20:13

## 2022-08-23 RX ADMIN — TIZANIDINE 4 MG: 4 TABLET ORAL at 14:16

## 2022-08-23 ASSESSMENT — PAIN SCALES - GENERAL
PAINLEVEL_OUTOF10: 0
PAINLEVEL_OUTOF10: 0
PAINLEVEL_OUTOF10: 6
PAINLEVEL_OUTOF10: 0
PAINLEVEL_OUTOF10: 8
PAINLEVEL_OUTOF10: 0
PAINLEVEL_OUTOF10: 6

## 2022-08-23 NOTE — PROGRESS NOTES
[facility] PROGRESS NOTE    8/23/2022 4:45 PM        Name: Peyman Oreilly . Admitted: 8/19/2022  Primary Care Provider: Stephen Ulrich MD (Tel: [phone])      Subjective:  . Patient seen this am, feeling ok, no cp or sob. Had unremarkable LHC yesterday. Was at [facility] two weeks ago. Lisinopril/hctz stopped at that time due to HILLARY and hypotension.          Reviewed interval ancillary notes    Current Medications  [START ON 8/24/2022] amLODIPine (NORVASC) tablet 10 mg, Daily  hydrALAZINE (APRESOLINE) tablet 25 mg, 3 times per day  insulin lispro (HUMALOG) injection vial 0-4 Units, 4x Daily AC & HS  aspirin chewable tablet 324 mg, Once  amitriptyline (ELAVIL) tablet 75 mg, Nightly PRN  aspirin EC tablet 81 mg, Daily  flecainide (TAMBOCOR) tablet 50 mg, 2 times per day  oxyCODONE-acetaminophen (PERCOCET) 5-325 MG per tablet 1 tablet, Q8H PRN  pregabalin (LYRICA) capsule 75 mg, TID  tiZANidine (ZANAFLEX) tablet 4 mg, TID  zolpidem (AMBIEN) tablet 5 mg, Nightly PRN  sodium chloride flush 0.9 % injection 5-40 mL, 2 times per day  sodium chloride flush 0.9 % injection 5-40 mL, PRN  0.9 % sodium chloride infusion, PRN  ondansetron (ZOFRAN-ODT) disintegrating tablet 4 mg, Q8H PRN   Or  ondansetron (ZOFRAN) injection 4 mg, Q6H PRN  acetaminophen (TYLENOL) tablet 650 mg, Q6H PRN   Or  acetaminophen (TYLENOL) suppository 650 mg, Q6H PRN  polyethylene glycol (GLYCOLAX) packet 17 g, Daily PRN  atorvastatin (LIPITOR) tablet 40 mg, Nightly  enoxaparin (LOVENOX) injection 40 mg, Daily  dextrose bolus 10% 125 mL, PRN   Or  dextrose bolus 10% 250 mL, PRN  glucagon (rDNA) injection 1 mg, PRN  dextrose 10 % infusion, Continuous PRN  perflutren lipid microspheres (DEFINITY) injection 1.65 mg, ONCE PRN      Objective:  BP (!) 172/85   Pulse (!) 48 Comment: PA notified  Temp 98 °F (36.7 °C) (Oral)   Resp 16   Ht 5' 8\" (1.727 follow-up imaging is recommended; if patient is high risk for malignancy, a   non-contrast Chest CT at 12 months is optional. If performed and the nodule   is stable at 12 months, no further follow-up is recommended. These guidelines do not apply to immunocompromised patients and patients with   cancer. Follow up in patients with significant comorbidities as clinically   warranted. For lung cancer screening, adhere to Lung-RADS guidelines. Reference: Radiology. 2017; 284(1):228-43. CT head:  No acute intracranial abnormality. Specifically, no evidence of acute   hemorrhage. Echo: 8/20/22   Summary   *Left ventricle - normal size, mild concentric LVH, normal function with EF   of 65%   *Aortic valve - sclerotic   *Tricuspid valve - trivial regurgitation with RVSP of 22mmHg          Problem List  Principal Problem:    Dyspnea on exertion  Active Problems:    Coronary artery disease involving native heart without angina pectoris    Anemia    Primary hypertension    Atrial fibrillation ([address]  Resolved Problems:    * No resolved hospital problems. *       Assessment & Plan:   Accelerated HTN:  maybe etiology of his dyspnea. CXR was unremarkable. Cath yesterday normal. Recent ECHO also normal. Norvasc added. BP this afternoon was 110s at 11 but then up to 170 later. Discussed with cards. Will add hydralazine. He had been on lisinopril and hctz which was stopped during last admission due to [facility] and hypotension. AF-currently sinus nayeli. Discussed with cards. Will dc the b blocker. Intermittent confusion:  none since admission. CT head negative for any acute findings. Anemia:  iron studies are normal.  Folate and B12 also normal. TSH in range. Pt reports normal C scope in the past 5 years   Lung nodule:  will need follow up imaging  T2DM: A1C:  5.5   Disposition-hopefully dc in am.        Diet: ADULT DIET;  Regular  Code:Full Code  DVT PPX      Jarocho Fountain PA-C   8/23/2022 4:45 PM

## 2022-08-23 NOTE — PROGRESS NOTES
Pt informed RN he has not had a bowel movement since 8/18/22. RN offered prn glycolax, pt declined. Pt states he does not want bowel intervention meds and wants to try drinking juice & ambulation.

## 2022-08-23 NOTE — PROGRESS NOTES
[facility]   Cardiology Progress Note     Date: 8/23/2022  Admit Date: 8/19/2022     Reason for consultation:     Chief Complaint   Patient presents with    Shortness of Breath     Pt walked from car to [employer] and felt SOB. This resolved after sitting for the [employer] game. When the pt ambulated back to the car he felt SOB again and decided to come get checked out. Pt recently diagnosed with afib. Pt arrives in afib. History of Present Illness: History obtained from patient and medical record. Parris Rojo is a 69 y.o. male admitted sob intermittent for several weeks. SOB worse with exertion. No cp. Wife notes intermittent confusion as well. Patient recently admitted to [facility] with cp. Strong family hx, dm, htn, chol. Concern for false negative stress recently and cardiology consulted. Trop 0.03, 0.03; Hgb 9.6  EKG - AF  (per consult note)    Interval Hx:   Pt reports PTA feeling short of breath when walking a further distance than he was used to so he came to the hospital.  LHC yesterday with normal cors. Today, he is feeling ok. Bradycardic in 40s-50s with toprol. Asymptomatic. Hypertensive. He is hoping to dc home. Patient seen and examined. Clinical notes reviewed. Telemetry reviewed. No new complaints today. No major events overnight. Denies having chest pain, palpitations, shortness of breath, orthopnea/PND, cough, or dizziness at the time of this visit. Past Medical History:  Past Medical History:   Diagnosis Date    Arthritis     Chronic back pain     Diabetes mellitus ([facility] [address]     Fracture of nasal bone     Headache     Hearing loss     High blood pressure     Hyperlipidemia     Wears partial dentures         Past Surgical History:    has a past surgical history that includes hip surgery (04/2008); joint replacement (2009, 2006, 2003, 1997); Appendectomy; Carpal tunnel release (Bilateral); back surgery (10/2007   1979  1980); knee surgery (Left);  Hip Arthroplasty (Left, 04/04/2017); sinus surgery; bronchoscopy; Esophagus surgery; Colonoscopy; and Shoulder arthroscopy (Left, 7/26/2019). Social History:  Reviewed. reports that he has never smoked. He has never used smokeless tobacco. He reports that he does not drink alcohol and does not use drugs. Allergies: Allergies   Allergen Reactions    Prochlorperazine Swelling     Swelling of tongue    Prednisone      Makes him ill. Family History:  Reviewed. family history includes Diabetes in his father and mother; Heart Disease in his father and mother; High Blood Pressure in his father and mother. Denies family history of sudden cardiac death, arrhythmia, premature CAD    Home Meds:  Prior to Visit Medications    Medication Sig Taking? Authorizing Provider   flecainide (TAMBOCOR) 50 MG tablet Take 1 tablet by mouth in the morning and 1 tablet before bedtime. Leyda Alarcon MD   metoprolol succinate (TOPROL XL) 50 MG extended release tablet Take 1 tablet by mouth in the morning. Leyda Alarcon MD   apixaban (ELIQUIS) 5 MG TABS tablet Take 1 tablet by mouth in the morning and 1 tablet before bedtime. Leyda Alarcon MD   pregabalin (LYRICA) 75 MG capsule Take 75 mg by mouth in the morning and 75 mg at noon and 75 mg before bedtime. Historical Provider, MD   MULTIPLE VITAMIN PO Take by mouth  Historical Provider, MD   aspirin 81 MG EC tablet Take 81 mg by mouth  Historical Provider, MD   glipiZIDE (GLUCOTROL) 5 MG tablet Take 5 mg by mouth daily as needed (BG > 180)  Patient not taking: Reported on 8/20/2022  Historical Provider, MD   nabumetone (RELAFEN) 750 MG tablet Take 750 mg by mouth in the morning and 750 mg before bedtime. Historical Provider, MD   tiZANidine (ZANAFLEX) 4 MG tablet Take 4 mg by mouth in the morning and 4 mg at noon and 4 mg before bedtime. Historical Provider, MD   oxyCODONE-acetaminophen (PERCOCET) 5-325 MG per tablet Take 1 tablet by mouth every 8 hours as needed for Pain. Historical Provider, MD   amitriptyline (ELAVIL) 75 MG tablet Take 75 mg by mouth nightly as needed   Historical Provider, MD   zolpidem (AMBIEN) 5 MG tablet Take 5 mg by mouth nightly as needed   Historical Provider, MD   metformin (GLUCOPHAGE) 500 MG tablet Take 500 mg by mouth in the morning and 500 mg in the evening. Take with meals.   Historical Provider, MD   Multiple Vitamins-Minerals (CENTRUM SILVER PO) Take 1 tablet by mouth daily  Historical Provider, MD   atorvastatin (LIPITOR) 20 MG tablet Take 20 mg by mouth nightly   Historical Provider, MD        Scheduled Meds:   amLODIPine  5 mg Oral Daily    metoprolol succinate  50 mg Oral Daily    insulin lispro  0-4 Units SubCUTAneous 4x Daily AC & HS    aspirin  324 mg Oral Once    aspirin  81 mg Oral Daily    flecainide  50 mg Oral 2 times per day    pregabalin  75 mg Oral TID    tiZANidine  4 mg Oral TID    sodium chloride flush  5-40 mL IntraVENous 2 times per day    atorvastatin  40 mg Oral Nightly    enoxaparin  40 mg SubCUTAneous Daily     Continuous Infusions:   sodium chloride      dextrose       PRN Meds:amitriptyline, oxyCODONE-acetaminophen, zolpidem, sodium chloride flush, sodium chloride, ondansetron **OR** ondansetron, acetaminophen **OR** acetaminophen, polyethylene glycol, dextrose bolus **OR** dextrose bolus, glucagon (rDNA), dextrose, perflutren lipid microspheres, hydrALAZINE     Review of Systems:  Constitutional: Negative for fever, night sweats, chills,  Skin: Negative for rash, pruritus, bleeding, or bruising    HEENT: Negative for vision changes, ringing in the ears, dysphagia  Respiratory: Reviewed in HPI  Cardiovascular: Reviewed in HPI  Gastrointestinal: Negative for abdominal pain, N/V/D, constipation, or black/tarry stools  Genito-Urinary: Negative for dysuria, incontinence, or hematuria  Musculoskeletal: Negative for joint swelling, muscle pain, or injuries  Neurological/Psych: Negative for confusion, seizures, headaches, or Lipids:   Lab Results   Component Value Date/Time    CHOL 113 2022 07:00 AM    HDL 47 2022 07:00 AM    TRIG 47 2022 07:00 AM     LFTS:   Lab Results   Component Value Date/Time    ALT 43 2022 11:46 PM    AST 52 2022 11:46 PM    ALKPHOS 80 2022 11:46 PM    PROT 6.1 2022 11:46 PM    PROT 6.2 2013 03:15 AM    AGRATIO 1.5 2022 11:46 PM    BILITOT 0.8 2022 11:46 PM     Cardiac Enzymes:   Lab Results   Component Value Date/Time    TROPONINI 0.02 2022 09:26 AM    TROPONINI 0.03 2022 07:00 AM    TROPONINI 0.03 2022 05:39 AM     Coags:   Lab Results   Component Value Date/Time    PROTIME 12.4 2017 03:23 PM    INR 1.10 2017 03:23 PM       EC22  Marked sinus bradycardia with marked sinus arrhythmia with 1st degree A-V block  Abnormal ECG  When compared with ECG of 21-AUG-2022 05:17,  Premature supraventricular complexes are no longer Present  Vent. rate has decreased BY 25 BPM    ECHO:  22   Summary   *Left ventricle - normal size, mild concentric LVH, normal function with EF   of 65%   *Aortic valve - sclerotic   *Tricuspid valve - trivial regurgitation with RVSP of 22mmHg    Stress Test: 22  Summary    Normal myocardial perfusion study. Normal LV size and systolic function. Overall findings represent a low risk study. Cath: 22  Findings  Artery Findings/Result   LM Normal   LAD Normal   Cx Normal   RI N/A   RCA Normal   LVEDP 21   LVG 60%      Intervention(s)  None     Post Cath Dx:       Normal coronaries    CTPA: 22  1. No pulmonary emboli. 2. Pulmonary arterial hypertension. 3. Atherosclerotic disease with coronary artery involvement. 4. Atelectasis, though no focal consolidation or pleural effusion. 5. There are a couple small pulmonary nodules, the largest of which measuring   5 mm in size. Recommend follow up based on criteria as below. 6. Hepatic steatosis.      Problem List: Patient Active Problem List    Diagnosis Date Noted    Coronary artery disease involving native heart without angina pectoris 08/21/2022    Anemia 08/21/2022    Primary hypertension 08/21/2022    Dyspnea on exertion 08/20/2022    Acute chest pain 08/05/2022    HILLARY (acute kidney injury) ([facility] [address] 08/05/2022    Hypotension due to medication 08/05/2022    Primary osteoarthritis of right hip 06/08/2022    Rotator cuff tendonitis, left 02/02/2022    Carpal tunnel syndrome of left wrist 01/18/2022    History of claustrophobia 03/13/2019    Lateral epicondylitis of left elbow 02/11/2019    Incomplete tear of left rotator cuff 02/11/2019    Status post total hip replacement, left 4/4/17 07/25/2017    Arthritis of left hip 04/04/2017    Trochanteric bursitis, left hip 05/25/2016    Primary osteoarthritis of left hip 05/25/2016    Left elbow pain 08/27/2013    Lateral epicondylitis of right elbow 08/27/2013    Trochanteric bursitis of left hip 03/13/2013    Atrial fibrillation ([facility] 01/17/2013    Contusion of elbow 02/22/2012    Postlaminectomy syndrome, lumbar region 11/08/2011    DDD (degenerative disc disease), lumbosacral 11/08/2011    Status post lumbar spinal fusion 10/21/2011    Status post lumbar surgery 01/30/2011    Trochanteric bursitis 07/12/2010    Back pain 01/14/2010    Left hip pain 01/14/2010        Assessment and Plan:     Shortness of breath   ~ stress test 8/5/22 normal   ~ Echo 8/21/22: EF 65%, valvular function stable, RVSP 22mmHg  ~ d/t persistent symptoms and concern for false negative stress test, underwent LHC 8/22/22 revealing normal coronary arteries, LVEF 60% by LVG.   ~ denies hx of COPD or asthma, nonsmoker   ~ would recommend sleep study as OP     Hypertension   ~ uncontrolled. Norvasc 10mg added today. Change toprol to coreg 12.5mg BID. Monitor HR closely. May need to consider additional agent if remains hypertensive as HR limiting factor.      Atrial fibrillation   ~ recent new diagnosis   ~ on flecainide and eliquis. Intermittent confusion   ~ CT head negative   ~ per primary     Anemia   ~ per primary     Would recommend monitoring overnight d/t med changes with goal for dc in AM.     Late entry: when evaluated pt earlier HR trended up into 50-60s. Notified about decreased to 49. Pt asymptomatic when asked earlier about symptoms with bradycardia. Stop BB all together. Was not in a fib rvr when diagnosed earlier this month. Continue flecainide. Add hydralazine for BP control. Multiple medical conditions with risk of decompensation. All pertinent information and plan of care discussed with the physician. All questions and concerns were addressed to the patient. Alternatives to my treatment were discussed. I have discussed the above stated plan with patient and family and the nurse. The patient and family verbalized understanding and agreed with the plan. Thank you for allowing to us to participate in the care of Patsy Blanchard. Total visit time > 35 minutes; > 50% spend counseling / coordinating care. I reviewed interval history, physical exam, review of data including labs, imaging, development and implementation of treatment plan and coordination of complex care. Counseled on risk factor modifications. Kate JOINER-CNP  [address]   Office: [phone]    NOTE:  This report was transcribed using voice recognition software. Every effort was made to ensure accuracy; however, inadvertent computerized transcription errors may be present.

## 2022-08-24 VITALS
HEART RATE: 68 BPM | HEIGHT: 68 IN | RESPIRATION RATE: 16 BRPM | TEMPERATURE: 98 F | SYSTOLIC BLOOD PRESSURE: 148 MMHG | OXYGEN SATURATION: 93 % | WEIGHT: 256 LBS | DIASTOLIC BLOOD PRESSURE: 78 MMHG | BODY MASS INDEX: 38.8 KG/M2

## 2022-08-24 LAB
GLUCOSE BLD-MCNC: 111 MG/DL (ref 70–99)
GLUCOSE BLD-MCNC: 245 MG/DL (ref 70–99)
PERFORMED ON: ABNORMAL
PERFORMED ON: ABNORMAL

## 2022-08-24 PROCEDURE — 6370000000 HC RX 637 (ALT 250 FOR IP): Performed by: PHYSICIAN ASSISTANT

## 2022-08-24 PROCEDURE — 99232 SBSQ HOSP IP/OBS MODERATE 35: CPT | Performed by: INTERNAL MEDICINE

## 2022-08-24 PROCEDURE — 6370000000 HC RX 637 (ALT 250 FOR IP): Performed by: NURSE PRACTITIONER

## 2022-08-24 PROCEDURE — 6370000000 HC RX 637 (ALT 250 FOR IP): Performed by: INTERNAL MEDICINE

## 2022-08-24 PROCEDURE — 6360000002 HC RX W HCPCS: Performed by: INTERNAL MEDICINE

## 2022-08-24 PROCEDURE — 2580000003 HC RX 258: Performed by: INTERNAL MEDICINE

## 2022-08-24 RX ORDER — HYDRALAZINE HYDROCHLORIDE 25 MG/1
50 TABLET, FILM COATED ORAL EVERY 8 HOURS
Status: DISCONTINUED | OUTPATIENT
Start: 2022-08-24 | End: 2022-08-24 | Stop reason: HOSPADM

## 2022-08-24 RX ORDER — AMLODIPINE BESYLATE 10 MG/1
10 TABLET ORAL DAILY
Qty: 30 TABLET | Refills: 1 | Status: SHIPPED | OUTPATIENT
Start: 2022-08-25 | End: 2022-10-07 | Stop reason: DRUGHIGH

## 2022-08-24 RX ORDER — HYDRALAZINE HYDROCHLORIDE 25 MG/1
25 TABLET, FILM COATED ORAL ONCE
Status: DISCONTINUED | OUTPATIENT
Start: 2022-08-24 | End: 2022-08-24 | Stop reason: DRUGHIGH

## 2022-08-24 RX ORDER — HYDRALAZINE HYDROCHLORIDE 50 MG/1
50 TABLET, FILM COATED ORAL EVERY 8 HOURS
Qty: 90 TABLET | Refills: 1 | Status: SHIPPED | OUTPATIENT
Start: 2022-08-24 | End: 2022-10-07 | Stop reason: ALTCHOICE

## 2022-08-24 RX ADMIN — OXYCODONE AND ACETAMINOPHEN 1 TABLET: 5; 325 TABLET ORAL at 05:13

## 2022-08-24 RX ADMIN — AMLODIPINE BESYLATE 10 MG: 5 TABLET ORAL at 08:56

## 2022-08-24 RX ADMIN — ZOLPIDEM TARTRATE 5 MG: 5 TABLET ORAL at 00:29

## 2022-08-24 RX ADMIN — PREGABALIN 75 MG: 75 CAPSULE ORAL at 08:56

## 2022-08-24 RX ADMIN — ENOXAPARIN SODIUM 40 MG: 100 INJECTION SUBCUTANEOUS at 08:56

## 2022-08-24 RX ADMIN — FLECAINIDE ACETATE 50 MG: 50 TABLET ORAL at 08:56

## 2022-08-24 RX ADMIN — TIZANIDINE 4 MG: 4 TABLET ORAL at 13:50

## 2022-08-24 RX ADMIN — INSULIN LISPRO 1 UNITS: 100 INJECTION, SOLUTION INTRAVENOUS; SUBCUTANEOUS at 13:11

## 2022-08-24 RX ADMIN — PREGABALIN 75 MG: 75 CAPSULE ORAL at 13:50

## 2022-08-24 RX ADMIN — HYDRALAZINE HYDROCHLORIDE 25 MG: 25 TABLET, FILM COATED ORAL at 05:13

## 2022-08-24 RX ADMIN — HYDRALAZINE HYDROCHLORIDE 50 MG: 25 TABLET, FILM COATED ORAL at 08:59

## 2022-08-24 RX ADMIN — Medication 10 ML: at 08:57

## 2022-08-24 RX ADMIN — ASPIRIN 81 MG: 81 TABLET, COATED ORAL at 08:56

## 2022-08-24 RX ADMIN — TIZANIDINE 4 MG: 4 TABLET ORAL at 08:56

## 2022-08-24 ASSESSMENT — PAIN DESCRIPTION - DESCRIPTORS: DESCRIPTORS: ACHING

## 2022-08-24 ASSESSMENT — PAIN DESCRIPTION - ORIENTATION: ORIENTATION: LOWER

## 2022-08-24 ASSESSMENT — PAIN SCALES - GENERAL
PAINLEVEL_OUTOF10: 6
PAINLEVEL_OUTOF10: 0
PAINLEVEL_OUTOF10: 7
PAINLEVEL_OUTOF10: 6
PAINLEVEL_OUTOF10: 0
PAINLEVEL_OUTOF10: 0

## 2022-08-24 ASSESSMENT — PAIN DESCRIPTION - PAIN TYPE: TYPE: CHRONIC PAIN

## 2022-08-24 ASSESSMENT — PAIN DESCRIPTION - LOCATION: LOCATION: BACK

## 2022-08-24 NOTE — PROGRESS NOTES
Data- discharge order received, pt verbalized agreement to discharge, disposition to previous residence, no needs for HHC/DME. Action- discharge instructions prepared and given to pt, pt verbalized understanding. Medication information packet given r/t NEW and/or CHANGED prescriptions emphasizing name/purpose/side effects, pt verbalized understanding. Discharge instruction summary: Diet- cardiac, Activity- as zane, Primary Care Physician as follows: Alexx Miller -[phone] f/u appointment , immunizations reviewed , prescription medications filled [facility]. Inpatient surgical procedure precautions reviewed: post radial cath CHF Education reviewed. Pt/ Family has had a total of 60 minutes CHF education this admission encounter. 1. WEIGHT: Admit Weight: 251 lb 6.4 oz (114 kg) (08/20/22 0524)        Today  Weight: 256 lb (116.1 kg) (08/24/22 0026)       2. O2 SAT.: SpO2: 93 % (08/24/22 1157)    Response- Pt belongings gathered, IV removed. Disposition is home (no HHC/DME needs), transported with wife, taken to lobby via w/c w/ this nurse, no complications.

## 2022-08-24 NOTE — PROGRESS NOTES
[address]  Daily Progress Note    Cardiologist Kirsty   Admit 8/19/2022   CC CP, AF, CHOL   Subjective Doing well today, wants to go home. Denies cp, headache. Exam BP (!) 188/82   Pulse 70   Temp 98.3 °F (36.8 °C) (Oral)   Resp 18   Ht 5' 8\" (1.727 m)   Wt 256 lb (116.1 kg)   SpO2 92%   BMI 38.92 kg/m²    Intake/Output Summary (Last 24 hours) at 8/24/2022 0958  Last data filed at 8/24/2022 0418  Gross per 24 hour   Intake 240 ml   Output 800 ml   Net -560 ml     Gen Alert, coop, no distress Heart  Rrr, no mrg   Head NC, AT, no abnorm Abd  Soft, NT, +BS, no mass, no OM   Eyes PER, conj/corn clear Ext  Ext nl, AT, no C/C/E   Nose Nares nl, no drain, NT Pulse 2+ and symmetric   Throat Lips, mucosa, tongue nl Skin Col/text/turg nl, no vis rash/les   Neck S/S, TM, NT, no bruit/JVD Psych Nl mood and affect   Lung CTA-B, unlabored, no DTP Lymph   No cervical or axillary LA   Ch wall NT, no deform Neuro  Nl gross M/S exam      Medications  hydrALAZINE  50 mg Oral Q8H    amLODIPine  10 mg Oral Daily    insulin lispro  0-4 Units SubCUTAneous 4x Daily AC & HS    aspirin  324 mg Oral Once    aspirin  81 mg Oral Daily    flecainide  50 mg Oral 2 times per day    pregabalin  75 mg Oral TID    tiZANidine  4 mg Oral TID    sodium chloride flush  5-40 mL IntraVENous 2 times per day    atorvastatin  40 mg Oral Nightly    enoxaparin  40 mg SubCUTAneous Daily      sodium chloride      dextrose        Labs Relevant and available CV data reviewed   Telemetry Reviewed   Time More than 35 minutes spent reviewing patient chart (including but not limited to notes, labs, imaging and other testing), interviewing patient and/or family members, performing a physical exam, documentation of my findings above and subsequent follow-up of ordered testing. More than 50% of that time spent face to face with patient discussing clinical condition and counseling regarding treatment plan.        Assessment and Plan  Problem Status Plan CP Noncardiac LHC normal with normal EF, echo with normal EF and pulmonary pressures  Risk factor modification  Outpatient sleep study   AF Controlled Continue flecainide/Eliquis   CHOL Controlled Continue HI/MT statin   HTN elevated Med changes noted yesterday  Ambulatory BP measurements and outpatient followup   FU  Dr. Jesiska Feliciano in several weeks

## 2022-08-24 NOTE — DISCHARGE INSTRUCTIONS
Radial Angiogram      Care of your puncture site:  Remove clear bandage 24 hours after the procedure. May shower in 24 hours  Inspect the site daily and gently clean using soap and water. Dry thoroughly and apply a Band-Aid. Normal Observations:  Soreness or tenderness which may last one week. Mild oozing from the incision site. Possible bruising that could last 2 weeks. Activity:  You may resume driving 24 hours following the procedure. You may resume normal activity in 3 days or after the wound heals. Avoid lifting more than 10 pounds for 3 days with affected arm. Nutrition:  Regular diet or ***. Drink at least 8 to 10 glasses of decaffeinated, non-alcoholic fluid for the next 24 hours to flush the x-ray dye used for your angiogram out of your body. Call your doctor immediately if your condition worsens, for any other concerns, for a follow-up appointment or if you experience any of the following:  Significant bleeding that does not stop after 10 minutes of applying firm pressure on the puncture site. Increased swelling of the wrist.  Unusual pain, numbness, or tingling of the wrist/arm. Any signs of infection such as: redness, yellow drainage at the site, swelling or pain. Other Instructions:  Hold Metformin or Metformin containing drugs for 48 hours after procedure.

## 2022-08-26 NOTE — DISCHARGE SUMMARY
[address] [facility] DISCHARGE SUMMARY    Patient Demographics    Patient. Cindy Jamison  Date of Birth. 1955  MRN. [id number]     Primary care provider. Pearlean Dakin, MD  (Tel: [phone])    Admit date: 8/19/2022    Discharge date (blank if same as Note Date): 8/24/2022  Note Date: 8/25/2022     Reason for Hospitalization. Chief Complaint   Patient presents with    Shortness of Breath     Pt walked from car to [employer] and felt SOB. This resolved after sitting for the [employer] game. When the pt ambulated back to the car he felt SOB again and decided to come get checked out. Pt recently diagnosed with afib. Pt arrives in afib. Significant Findings. Principal Problem:    Dyspnea on exertion  Active Problems:    Coronary artery disease involving native heart without angina pectoris    Anemia    Primary hypertension    Atrial fibrillation (HCC)  Resolved Problems:    * No resolved hospital problems. *       Problems and results from this hospitalization that need follow up. Hypertension  Afib  Bradycardia  Possible sleep apnea    Significant test results and incidental findings. CT head  No acute intracranial abnormality. Specifically, no evidence of acute   hemorrhage. CXR  Mildly prominent peribronchial markings seen at the right lung base, which   could be related to bronchiolitis. No consolidation or pleural effusion. CTPA  1. No pulmonary emboli. 2. Pulmonary arterial hypertension. 3. Atherosclerotic disease with coronary artery involvement. 4. Atelectasis, though no focal consolidation or pleural effusion. 5. There are a couple small pulmonary nodules, the largest of which measuring   5 mm in size. Recommend follow up based on criteria as below. 6. Hepatic steatosis. RECOMMENDATIONS:   Multiple pulmonary nodules.  Most severe: 5 mm left solid pulmonary nodule   within the upper lobe. If patient is low risk for malignancy, no routine   follow-up imaging is recommended; if patient is high risk for malignancy, a   non-contrast Chest CT at 12 months is optional. If performed and the nodule   is stable at 12 months, no further follow-up is recommended. These guidelines do not apply to immunocompromised patients and patients with   cancer. Follow up in patients with significant comorbidities as clinically   warranted. For lung cancer screening, adhere to Lung-RADS guidelines. Reference: Radiology. 2017; 284(1):228-43. Invasive procedures and treatments. None     [facility] Course. Patient is a 68 yo male recently admitted to [facility] with hypotension, a fib, who presented to the hospital with intermittent confusion, exertional SOB. His BP was 205/107. CTPA in the ED was negative for PE but was suggestive of pulmonary hypertension, atelectasis, and a few pulmonary nodules. CT of the head was negative. He was admitted and seen by cardiology. He underwent a left heart cath which was normal. His BP was elevated. He was started on norvasc as well as hydralazine. His b blocker was discontinued due to bradycardia. He will follow up with PCP in one week. He will follow up with EP in 1 month. Consults. IP CONSULT TO CARDIOLOGY    Physical examination on discharge day. BP (!) 148/78 Comment: up and sitting on egde of bed  Pulse 68   Temp 98 °F (36.7 °C) (Oral)   Resp 16   Ht 5' 8\" (1.727 m)   Wt 256 lb (116.1 kg)   SpO2 93%   BMI 38.92 kg/m²   General appearance. Alert. Looks comfortable. HEENT. Sclera clear. Moist mucus membranes. Cardiovascular. Regular rate and rhythm, normal S1, S2. No murmur. Respiratory. Not using accessory muscles. Clear to auscultation bilaterally, no wheeze. Gastrointestinal. Abdomen soft, non-tender, not distended, normal bowel sounds  Neurology. Facial symmetry. No speech deficits.  Moving all extremities equally. Extremities. No edema in lower extremities. Skin. Warm, dry, normal turgor    Condition at time of discharge stable    Medication instructions provided to patient at discharge. Medication List        START taking these medications      amLODIPine 10 MG tablet  Commonly known as: NORVASC  Take 1 tablet by mouth daily  Notes to patient: CALCIUM CHANNEL BLOCKERS  Use:  treats fast heartbeat and high blood pressure. Side effects:  headache, dizziness and fluid retention/swelling . hydrALAZINE 50 MG tablet  Commonly known as: APRESOLINE  Take 1 tablet by mouth in the morning and 1 tablet at noon and 1 tablet in the evening. Notes to patient: Hydralazine  Use: treat heart failure, lower blood pressure  Side effects: headache and dizziness            CONTINUE taking these medications      amitriptyline 75 MG tablet  Commonly known as: ELAVIL  Notes to patient: Use: sleep aide  Side effects: sleepiness, dry mouth     apixaban 5 MG Tabs tablet  Commonly known as: Eliquis  Take 1 tablet by mouth in the morning and 1 tablet before bedtime. Notes to patient: Use: blood thinner reduces risk for blood clots, heart attack and stroke  Side effects: bruising, bleeding     aspirin 81 MG EC tablet  Notes to patient: Use: prevents blood clots  Side effects: bruising, bleeding     atorvastatin 20 MG tablet  Commonly known as: LIPITOR  Notes to patient: Use: Lowers cholesterol  Side effects: muscle pain, headache, constipation, diarrhea     CENTRUM SILVER PO     flecainide 50 MG tablet  Commonly known as: TAMBOCOR  Take 1 tablet by mouth in the morning and 1 tablet before bedtime.   Notes to patient: Use: to regulate heart rhythm  Side effects: dizzy, urinary rentention, blurry vision     MULTIPLE VITAMIN PO     nabumetone 750 MG tablet  Commonly known as: RELAFEN  Notes to patient: Use: reduces inflammation  Side effects: bruising, bleeding     oxyCODONE-acetaminophen 5-325 MG per tablet  Commonly known as: PERCOCET     pregabalin 75 MG capsule  Commonly known as: LYRICA  Notes to patient: Use: to help control certain kinds of seizures, painful nerve diseases or fibromyalgia  Side effects: weight gain, headache, dry mouth     tiZANidine 4 MG tablet  Commonly known as: Fredo Riojas  Notes to patient: Use: is a muscle relaxant to treat muscle spasms  Side effects: muscle weakness, low blood pressure, dizziness, fatigue, constipation and dry mouth     zolpidem 5 MG tablet  Commonly known as: AMBIEN  Notes to patient: Use: antidepressant used to treat anxiety and depression, sleep aide  Side effects: dry mouth, nausea, sleepiness, fatigue            STOP taking these medications      glipiZIDE 5 MG tablet  Commonly known as: GLUCOTROL     metFORMIN 500 MG tablet  Commonly known as: GLUCOPHAGE  Notes to patient: Keeps blood sugar from going too high  Side effects: abdominal pain, diarrhea     metoprolol succinate 50 MG extended release tablet  Commonly known as: TOPROL XL               Where to Get Your Medications        These medications were sent to [address] [place] [address] [address] [phone] [address], [facility] [address]      Phone: [phone]   amLODIPine 10 MG tablet  hydrALAZINE 50 MG tablet         Discharge recommendations given to patient. Follow Up. PCP in 1 week, EP in 2 weeks   Disposition. home  Activity. activity as tolerated      Spent 36 minutes in discharge process. Signed:   Eulalia Rose PA-C     8/25/2022 9:17 PM

## 2022-09-06 ENCOUNTER — OFFICE VISIT (OUTPATIENT)
Dept: ORTHOPEDIC SURGERY | Age: 67
End: 2022-09-06
Payer: COMMERCIAL

## 2022-09-06 VITALS — WEIGHT: 256 LBS | HEIGHT: 68 IN | RESPIRATION RATE: 16 BRPM | BODY MASS INDEX: 38.8 KG/M2

## 2022-09-06 DIAGNOSIS — S32.592A CLOSED FRACTURE OF LEFT SIDE OF SYMPHYSIS PUBIS, INITIAL ENCOUNTER (HCC): ICD-10-CM

## 2022-09-06 DIAGNOSIS — Z96.642 STATUS POST TOTAL HIP REPLACEMENT, LEFT: ICD-10-CM

## 2022-09-06 DIAGNOSIS — M25.552 LEFT HIP PAIN: Primary | ICD-10-CM

## 2022-09-06 PROCEDURE — 99213 OFFICE O/P EST LOW 20 MIN: CPT | Performed by: PHYSICIAN ASSISTANT

## 2022-09-06 PROCEDURE — 3017F COLORECTAL CA SCREEN DOC REV: CPT | Performed by: PHYSICIAN ASSISTANT

## 2022-09-06 PROCEDURE — 1123F ACP DISCUSS/DSCN MKR DOCD: CPT | Performed by: PHYSICIAN ASSISTANT

## 2022-09-06 PROCEDURE — G8427 DOCREV CUR MEDS BY ELIG CLIN: HCPCS | Performed by: PHYSICIAN ASSISTANT

## 2022-09-06 PROCEDURE — 1036F TOBACCO NON-USER: CPT | Performed by: PHYSICIAN ASSISTANT

## 2022-09-06 PROCEDURE — G8417 CALC BMI ABV UP PARAM F/U: HCPCS | Performed by: PHYSICIAN ASSISTANT

## 2022-09-06 PROCEDURE — 1111F DSCHRG MED/CURRENT MED MERGE: CPT | Performed by: PHYSICIAN ASSISTANT

## 2022-09-06 NOTE — PROGRESS NOTES
Subjective:      Patient ID: Cindy Jamison is a 69 y.o. male who is here for an initial evaluation of left groin pain. He has a history of prior left hip arthroplasty. He had a fall on 8/28/2022 and at that time injured his neck and shoulder. Pain is improving. He still complaining of discomfort in the left groin. Difficulty raising his left leg to don shoes and socks. Difficulty getting in or out of a car. Mild discomfort with weightbearing. He did undergo a right hip intra-articular injection by Dr. Jacob Thomas for right hip pain prior to fall. He reports 100% relief of right hip pain after right hip injection. Pain Scale 8/10 VAS. Location of pain left groin. Pain is worse with activity. Pain improves with rest.   Previous treatments have included tylenol. As outlined in the HPI. Negative for poly-joint pain, swelling and stiffness. Negative numbness or tingling into the affected extremity.      Past Medical History:   Diagnosis Date    Arthritis     Chronic back pain     Diabetes mellitus ([facility] [address]     Fracture of nasal bone     Headache     Hearing loss     High blood pressure     Hyperlipidemia     Wears partial dentures        Family History   Problem Relation Age of Onset    Heart Disease Mother     High Blood Pressure Mother     Diabetes Mother     Heart Disease Father     High Blood Pressure Father     Diabetes Father        Past Surgical History:   Procedure Laterality Date    APPENDECTOMY      BACK SURGERY  10/2007   1979  1980    BRONCHOSCOPY      CARPAL TUNNEL RELEASE Bilateral     COLONOSCOPY      ESOPHAGUS SURGERY      HIP ARTHROPLASTY Left 04/04/2017    HIP SURGERY  04/2008    bursa removed    JOINT REPLACEMENT  2009, 2006, 2003, 1997    Left knee replacement    KNEE SURGERY Left     [id number] scopes    SHOULDER ARTHROSCOPY Left 7/26/2019    LEFT SHOULDER ARTHROSCOPY, SUBACROMIAL DECOMPRESSION, ROTATOR CUFF REPAIR performed by Richard Riggins MD at [facility] Social History     Occupational History    Not on file   Tobacco Use    Smoking status: Never    Smokeless tobacco: Never   Vaping Use    Vaping Use: Never used   Substance and Sexual Activity    Alcohol use: No    Drug use: No    Sexual activity: Not Currently       Current Outpatient Medications   Medication Sig Dispense Refill    hydrALAZINE (APRESOLINE) 50 MG tablet Take 1 tablet by mouth in the morning and 1 tablet at noon and 1 tablet in the evening. 90 tablet 1    amLODIPine (NORVASC) 10 MG tablet Take 1 tablet by mouth daily 30 tablet 1    flecainide (TAMBOCOR) 50 MG tablet Take 1 tablet by mouth in the morning and 1 tablet before bedtime. 60 tablet 3    apixaban (ELIQUIS) 5 MG TABS tablet Take 1 tablet by mouth in the morning and 1 tablet before bedtime. 60 tablet 0    pregabalin (LYRICA) 75 MG capsule Take 75 mg by mouth in the morning and 75 mg at noon and 75 mg before bedtime. MULTIPLE VITAMIN PO Take by mouth      aspirin 81 MG EC tablet Take 81 mg by mouth      nabumetone (RELAFEN) 750 MG tablet Take 750 mg by mouth in the morning and 750 mg before bedtime. tiZANidine (ZANAFLEX) 4 MG tablet Take 4 mg by mouth in the morning and 4 mg at noon and 4 mg before bedtime. oxyCODONE-acetaminophen (PERCOCET) 5-325 MG per tablet Take 1 tablet by mouth every 8 hours as needed for Pain. amitriptyline (ELAVIL) 75 MG tablet Take 75 mg by mouth nightly as needed       zolpidem (AMBIEN) 5 MG tablet Take 5 mg by mouth nightly as needed       Multiple Vitamins-Minerals (CENTRUM SILVER PO) Take 1 tablet by mouth daily      atorvastatin (LIPITOR) 20 MG tablet Take 20 mg by mouth nightly        No current facility-administered medications for this visit. Allergies   Allergen Reactions    Prochlorperazine Swelling     Swelling of tongue    Prednisone      Makes him ill. Objective:     He is alert, oriented x 3, pleasant, well nourished, developed and in no acute distress.      Resp 16  5' 8\" (1.727 m)   Wt 256 lb (116.1 kg)   BMI 38.92 kg/m²      Examination of the left hip:  Mild pain with active ROM. Mild pain with passive ROM. No instability, crepitus with ROM. Mild pain with weight bearing. Gait is normal.       Lower extremities:  4+ to 5/5 motor strength of bilateral lower extremities. Negative straight leg raise, bilaterally. Deep tendon reflexes at knees and achilles are 2+. Sensation is intact to light touch L3 to S1 bilaterally. No clonus. Examination of the lower extremities shows intact perfusion to both lower extremities. No cyanosis and digigts are warm to touch, capillary refill is less than 2 seconds. There is mild edema noted. Intact skin without lacerations or abrasions, no significant erythema, rashes or skin lesions. X Rays: were performed in the office today:   AP Pelvis, AP and Frog Leg Lateral  Left Hip:   There is a left total hip arthroplasty present. The alignment is satisfactory. Nondisplaced fracture of the left pubic symphysis. There does not appear to be any fractures within the anterior column of the acetabulum. Additional Tests reviewed: none  Additional Outside Records reviewed: none    Diagnosis:       ICD-10-CM    1. Left hip pain  M25.552 XR HIP 2-3 VW W PELVIS LEFT      2. Status post total hip replacement, left 4/4/17  Z96.642       3. Closed fracture of left side of symphysis pubis[facility]  S32.592A            Assessment and Plan:       Assessment:  Left groin pain after a recent fall. X-rays show a nondisplaced fracture at the left pubic symphysis. I had an extensive discussion with Mr. Amira Grossman regarding the natural history, etiology, and long term consequences of his condition. I have presented reasonable alternatives to the patient's proposed care, treatment, and services. Risks and benefits of the treatment options also reviewed in detail.  I have outlined a treatment plan with them. He has had full opportunity to ask his questions. I have answered them all to his satisfaction. I feel that Mr. Bruce Bennett understands our discussion today. Plan:  Rest, Ice, Compression and Elevation  Activity restriction/ Modification discussed. Follow up- 6 weeks. Call or return to clinic if these symptoms worsen or fail to improve as anticipated. Georga Snellen PA-C   Senior Physician Assistant   [facility]                                         Disclaimer: This note was generated with use of a verbal recognition program (DRAGON) and an attempt was made to check for errors. It is possible that there are still dictated errors within this office note. If so, please bring any significant errors to my attention for an addendum. All efforts were made to ensure that this office note is accurate.

## 2022-09-15 ENCOUNTER — TELEPHONE (OUTPATIENT)
Dept: ORTHOPEDIC SURGERY | Age: 67
End: 2022-09-15

## 2022-09-15 ENCOUNTER — OFFICE VISIT (OUTPATIENT)
Dept: ORTHOPEDIC SURGERY | Age: 67
End: 2022-09-15
Payer: COMMERCIAL

## 2022-09-15 VITALS — HEIGHT: 69 IN | WEIGHT: 245 LBS | BODY MASS INDEX: 36.29 KG/M2

## 2022-09-15 DIAGNOSIS — S63.502A SPRAIN OF LEFT WRIST, INITIAL ENCOUNTER: ICD-10-CM

## 2022-09-15 DIAGNOSIS — M25.532 WRIST PAIN, ACUTE, LEFT: Primary | ICD-10-CM

## 2022-09-15 PROCEDURE — 1036F TOBACCO NON-USER: CPT | Performed by: PHYSICIAN ASSISTANT

## 2022-09-15 PROCEDURE — G8417 CALC BMI ABV UP PARAM F/U: HCPCS | Performed by: PHYSICIAN ASSISTANT

## 2022-09-15 PROCEDURE — L3908 WHO COCK-UP NONMOLDE PRE OTS: HCPCS | Performed by: PHYSICIAN ASSISTANT

## 2022-09-15 PROCEDURE — G8427 DOCREV CUR MEDS BY ELIG CLIN: HCPCS | Performed by: PHYSICIAN ASSISTANT

## 2022-09-15 PROCEDURE — 3017F COLORECTAL CA SCREEN DOC REV: CPT | Performed by: PHYSICIAN ASSISTANT

## 2022-09-15 PROCEDURE — 99213 OFFICE O/P EST LOW 20 MIN: CPT | Performed by: PHYSICIAN ASSISTANT

## 2022-09-15 PROCEDURE — 1111F DSCHRG MED/CURRENT MED MERGE: CPT | Performed by: PHYSICIAN ASSISTANT

## 2022-09-15 PROCEDURE — 1123F ACP DISCUSS/DSCN MKR DOCD: CPT | Performed by: PHYSICIAN ASSISTANT

## 2022-09-15 NOTE — PROGRESS NOTES
Subjective:      Patient ID: Terra Damon is a very pleasant 69 y.o.  male. He is here for an initial evaluation of left wrist pain. Onset of symptoms he fell when he lost his balance at a high school football game and injured his left shoulder. Now he believes he may have injured his left wrist at the same time. However, he was only experiencing mild discomfort in the left wrist until earlier today after using his arm to push himself up out of his reclining chair. He developed sharp pain in the left wrist.   Location of pain-over the dorsal carpals. .    Pain is worse with activity. Pain improves with rest.   There is not associated numbness/ tingling. Previous treatments have included: Ice     Review of Systems:  I have reviewed the clinically relevant past medical history, medications, allergies, family history, social history, and 13 point Review of Systems from the patient's recent history form & documented any details relevant to today's presenting complaints in the history above. The patient's self-reported past medical history, medications, allergies, family history, social history, and Review of Systems form from 9/6/2022 have been scanned into the chart under the \"Media\" tab.       Past Medical History:   Diagnosis Date    Arthritis     Chronic back pain     Diabetes mellitus ([address]     Fracture of nasal bone     Headache     Hearing loss     High blood pressure     Hyperlipidemia     Wears partial dentures        Family History   Problem Relation Age of Onset    Heart Disease Mother     High Blood Pressure Mother     Diabetes Mother     Heart Disease Father     High Blood Pressure Father     Diabetes Father        Past Surgical History:   Procedure Laterality Date    APPENDECTOMY      BACK SURGERY  10/2007   [address] Bilateral     COLONOSCOPY      ESOPHAGUS SURGERY      HIP ARTHROPLASTY Left 04/04/2017    HIP SURGERY  04/2008    bursa removed JOINT REPLACEMENT  2009, 2006, 2003, 1997    Left knee replacement    KNEE SURGERY Left     [id number] scopes    SHOULDER ARTHROSCOPY Left 7/26/2019    LEFT SHOULDER ARTHROSCOPY, SUBACROMIAL DECOMPRESSION, ROTATOR CUFF REPAIR performed by Mayte Tyson MD at [address] [place] [address] History     Occupational History    Not on file   Tobacco Use    Smoking status: Never    Smokeless tobacco: Never   Vaping Use    Vaping Use: Never used   Substance and Sexual Activity    Alcohol use: No    Drug use: No    Sexual activity: Not Currently       Current Outpatient Medications   Medication Sig Dispense Refill    hydrALAZINE (APRESOLINE) 50 MG tablet Take 1 tablet by mouth in the morning and 1 tablet at noon and 1 tablet in the evening. 90 tablet 1    amLODIPine (NORVASC) 10 MG tablet Take 1 tablet by mouth daily 30 tablet 1    flecainide (TAMBOCOR) 50 MG tablet Take 1 tablet by mouth in the morning and 1 tablet before bedtime. 60 tablet 3    apixaban (ELIQUIS) 5 MG TABS tablet Take 1 tablet by mouth in the morning and 1 tablet before bedtime. 60 tablet 0    pregabalin (LYRICA) 75 MG capsule Take 75 mg by mouth in the morning and 75 mg at noon and 75 mg before bedtime. MULTIPLE VITAMIN PO Take by mouth      aspirin 81 MG EC tablet Take 81 mg by mouth      nabumetone (RELAFEN) 750 MG tablet Take 750 mg by mouth in the morning and 750 mg before bedtime. tiZANidine (ZANAFLEX) 4 MG tablet Take 4 mg by mouth in the morning and 4 mg at noon and 4 mg before bedtime. oxyCODONE-acetaminophen (PERCOCET) 5-325 MG per tablet Take 1 tablet by mouth every 8 hours as needed for Pain.       amitriptyline (ELAVIL) 75 MG tablet Take 75 mg by mouth nightly as needed       zolpidem (AMBIEN) 5 MG tablet Take 5 mg by mouth nightly as needed       Multiple Vitamins-Minerals (CENTRUM SILVER PO) Take 1 tablet by mouth daily      atorvastatin (LIPITOR) 20 MG tablet Take 20 mg by mouth nightly        No current facility-administered medications for this visit. Objective:     @AYLEEN  is  oriented to person, place and time, pleasant, well nourished, developed and in no acute distress. Ht 5' 9\" (1.753 m)   Wt 245 lb (111.1 kg)   BMI 36.18 kg/m²      Left wrist exam:  There is mild swelling. There is no skeletal deformity. Maximal pain is elicited with palpation of the Dorsal distal radius and proximal carpals. There is not clinical evidence of skeletal deformity or mal-rotation. Anatomic snuffbox tenderness absent. Wrist range of motion is limited by pain and or swelling. FDS,FDP and Common Extensor tendon function is intact to each digit. FPL and EPL tendon function is intact to the thumb. Skin color, texture, turgor is normal.  No rashes or lesions found of the injured extremity. Vascular exam shows normal  And good capillary refill bilaterally. Sensation is subjectively normal in the whole hand. Digits are normally sensate. X Rays: performed in the office today:   AP, Lateral and Oblique of the Left Wrist:  Radiographs demonstrate normal bony alignment of the wrist.  Mild degenerative arthritis noted of the left wrist.  There is no radiographic evidence of a fracture or dislocation. Diagnosis:       ICD-10-CM    1. Wrist pain, acute, left  M25.532 XR WRIST LEFT (MIN 3 VIEWS)      2. Sprain of left wrist, initial encounter  S63.502A Shakira Shaw Wrist Short Brace           Assessment/plan:       Assessment:  Probable acute wrist sprain from initial injury 9/11/2022. Symptoms aggravated by using his left hand to push himself up out of a reclining chair. Remains neurologically intact in the left hand. X-rays negative for acute fracture, subluxation or dislocation. I had an extensive discussion with Mr. Danay Gautam and/or family regarding the natural history, etiology, and long term consequences of his condition.    I have presented reasonable alternatives to the patient's the condition. Follow up- 2 weeks. Call or return to clinic if these symptoms worsen or fail to improve as anticipated. Pearly Sicard PA-C   Senior Physician Assistant   [facility]                                         Disclaimer: This note was generated with use of a verbal recognition program (DRAGON) and an attempt was made to check for errors. It is possible that there are still dictated errors within this office note. If so, please bring any significant errors to my attention for an addendum. All efforts were made to ensure that this office note is accurate.

## 2022-09-22 NOTE — PROGRESS NOTES
findings represent a low risk study. Corornary angiogram  & Intervention:  Cardiac cath 8/22/2022  Findings  Artery Findings/Result   LM Normal   LAD Normal   Cx Normal   RI N/A   RCA Normal   LVEDP 21   LVG 60%       ASSESSMENT AND PLAN:      Paroxsymal atrial fibrillation  Sinus rhythm on flecainide  Chads Vasc 2 (HTN age). Continue Eliquis and flecainide. Discontinue Asa. Dyspnea  Intermittent. CT of the chest was read as showing pulmonary hypertension but his echo showed RV systolic pressure of 22  Not been a smoker  Systolic heart failure is more likely of his intermittent desaturation  Start him on chlorthalidone which would probably help him with his breathing  Am also giving him torsemide 20 mg to be used for as needed basis    Hypertension  Elevated today. Will initiate Tenoretic 50/25. Will consider changing hydralazine during the next visit. Hyperlipidemia  Continue statin therapy. Follow up in 2-3 weeks. Thank you very much for allowing me to participate in the care of your patient. Please do not hesitate to contact me if you have any questions.         Sincerely,    Daniella Pham M.D  [facility] [place] [address]  Ph: [phone]  Fax: [phone]    This note was scribed in the presence of Dr Costa Seymour, by Ethel Briones RN

## 2022-09-23 ENCOUNTER — OFFICE VISIT (OUTPATIENT)
Dept: CARDIOLOGY CLINIC | Age: 67
End: 2022-09-23
Payer: COMMERCIAL

## 2022-09-23 VITALS
SYSTOLIC BLOOD PRESSURE: 140 MMHG | HEART RATE: 80 BPM | HEIGHT: 69 IN | DIASTOLIC BLOOD PRESSURE: 72 MMHG | WEIGHT: 248 LBS | BODY MASS INDEX: 36.73 KG/M2 | OXYGEN SATURATION: 92 %

## 2022-09-23 DIAGNOSIS — Z09 HOSPITAL DISCHARGE FOLLOW-UP: ICD-10-CM

## 2022-09-23 DIAGNOSIS — R06.09 DOE (DYSPNEA ON EXERTION): ICD-10-CM

## 2022-09-23 DIAGNOSIS — E78.2 MIXED HYPERLIPIDEMIA: ICD-10-CM

## 2022-09-23 DIAGNOSIS — I10 ESSENTIAL HYPERTENSION: ICD-10-CM

## 2022-09-23 DIAGNOSIS — I48.0 PAF (PAROXYSMAL ATRIAL FIBRILLATION) (HCC): Primary | ICD-10-CM

## 2022-09-23 PROCEDURE — 1111F DSCHRG MED/CURRENT MED MERGE: CPT | Performed by: INTERNAL MEDICINE

## 2022-09-23 PROCEDURE — G8417 CALC BMI ABV UP PARAM F/U: HCPCS | Performed by: INTERNAL MEDICINE

## 2022-09-23 PROCEDURE — 93000 ELECTROCARDIOGRAM COMPLETE: CPT | Performed by: INTERNAL MEDICINE

## 2022-09-23 PROCEDURE — G8427 DOCREV CUR MEDS BY ELIG CLIN: HCPCS | Performed by: INTERNAL MEDICINE

## 2022-09-23 PROCEDURE — 1123F ACP DISCUSS/DSCN MKR DOCD: CPT | Performed by: INTERNAL MEDICINE

## 2022-09-23 PROCEDURE — 3017F COLORECTAL CA SCREEN DOC REV: CPT | Performed by: INTERNAL MEDICINE

## 2022-09-23 PROCEDURE — 99214 OFFICE O/P EST MOD 30 MIN: CPT | Performed by: INTERNAL MEDICINE

## 2022-09-23 PROCEDURE — 1036F TOBACCO NON-USER: CPT | Performed by: INTERNAL MEDICINE

## 2022-09-23 RX ORDER — ATENOLOL AND CHLORTHALIDONE TABLET 50; 25 MG/1; MG/1
1 TABLET ORAL DAILY
Qty: 30 TABLET | Refills: 3 | Status: SHIPPED | OUTPATIENT
Start: 2022-09-23 | End: 2022-10-07 | Stop reason: ALTCHOICE

## 2022-09-23 RX ORDER — TORSEMIDE 20 MG/1
20 TABLET ORAL DAILY PRN
Qty: 30 TABLET | Refills: 3 | Status: SHIPPED | OUTPATIENT
Start: 2022-09-23

## 2022-10-07 ENCOUNTER — OFFICE VISIT (OUTPATIENT)
Dept: CARDIOLOGY CLINIC | Age: 67
End: 2022-10-07
Payer: COMMERCIAL

## 2022-10-07 VITALS
SYSTOLIC BLOOD PRESSURE: 100 MMHG | OXYGEN SATURATION: 92 % | DIASTOLIC BLOOD PRESSURE: 56 MMHG | WEIGHT: 241 LBS | BODY MASS INDEX: 36.53 KG/M2 | HEART RATE: 48 BPM | HEIGHT: 68 IN

## 2022-10-07 DIAGNOSIS — E78.2 MIXED HYPERLIPIDEMIA: ICD-10-CM

## 2022-10-07 DIAGNOSIS — I48.0 PAF (PAROXYSMAL ATRIAL FIBRILLATION) (HCC): Primary | ICD-10-CM

## 2022-10-07 DIAGNOSIS — R06.09 DOE (DYSPNEA ON EXERTION): ICD-10-CM

## 2022-10-07 DIAGNOSIS — I10 ESSENTIAL HYPERTENSION: ICD-10-CM

## 2022-10-07 PROCEDURE — G8427 DOCREV CUR MEDS BY ELIG CLIN: HCPCS | Performed by: INTERNAL MEDICINE

## 2022-10-07 PROCEDURE — G8484 FLU IMMUNIZE NO ADMIN: HCPCS | Performed by: INTERNAL MEDICINE

## 2022-10-07 PROCEDURE — 1123F ACP DISCUSS/DSCN MKR DOCD: CPT | Performed by: INTERNAL MEDICINE

## 2022-10-07 PROCEDURE — G8417 CALC BMI ABV UP PARAM F/U: HCPCS | Performed by: INTERNAL MEDICINE

## 2022-10-07 PROCEDURE — 93000 ELECTROCARDIOGRAM COMPLETE: CPT | Performed by: INTERNAL MEDICINE

## 2022-10-07 PROCEDURE — 3017F COLORECTAL CA SCREEN DOC REV: CPT | Performed by: INTERNAL MEDICINE

## 2022-10-07 PROCEDURE — 1036F TOBACCO NON-USER: CPT | Performed by: INTERNAL MEDICINE

## 2022-10-07 PROCEDURE — 99214 OFFICE O/P EST MOD 30 MIN: CPT | Performed by: INTERNAL MEDICINE

## 2022-10-07 RX ORDER — AMLODIPINE BESYLATE 10 MG/1
5 TABLET ORAL DAILY
Qty: 30 TABLET | Refills: 1 | Status: SHIPPED
Start: 2022-10-07 | End: 2022-10-07 | Stop reason: DRUGHIGH

## 2022-10-07 RX ORDER — AMLODIPINE BESYLATE 5 MG/1
5 TABLET ORAL DAILY
Qty: 30 TABLET | Refills: 5 | Status: SHIPPED | OUTPATIENT
Start: 2022-10-07 | End: 2022-11-02

## 2022-10-07 RX ORDER — LORAZEPAM 0.5 MG/1
TABLET ORAL
COMMUNITY
Start: 2022-10-03

## 2022-10-07 RX ORDER — CHLORTHALIDONE 25 MG/1
25 TABLET ORAL DAILY
Qty: 30 TABLET | Refills: 5 | Status: SHIPPED | OUTPATIENT
Start: 2022-10-07 | End: 2022-11-02

## 2022-10-07 RX ORDER — NALOXONE HYDROCHLORIDE 4 MG/.1ML
1 SPRAY NASAL PRN
COMMUNITY
Start: 2022-03-24

## 2022-10-07 NOTE — PROGRESS NOTES
[address]  Cardiology Note      [facility] [place]  1955, 69 y.o.          CC: \" lightheaded/dizzy. \"            Eleuterio Campo MD:      HPI:   This is a 69 y.o. male with a past medical history significant for hypertension, hyperlipidemia, and atrial fibrillation. He presented to the emergency room on 8/20/2022 with worsening dyspnea and confusion. Prior to that admission he was in the emergency room on 8/5/2022 and was found to be in atrial fibrillation. He was then taken to  on 8/29/2022 due to a fall. Today, he is here for hospital follow up. He continues to have episodes of shortness of breath. He is scheduled to see pulmonary at [facility]. He has developed double vision since the fall. He has been evaluated by [place] [facility] [place] and will see neurology soon as well. He has a fractured pelvis from the fall as well. Patient denies exertional chest pain/pressure, dyspnea at rest, palpitations, lightheadedness, weight changes, changes in LE edema, and syncope. Interval History: Today, patient returns in follow up for management of Afib and accompanied by wife who states his blood pressure has been running low and it was low while in appointment with Neurology. BP was 97/54 at that time and pulse was 49. During that visit, patient denied any feelings of dizziness/lightheadedness. Patient states today, he feels lightheaded / dizzy. He denies any syncope prior to today's visit. Wife states he is scheduled for an MRI on Monday.      Past Medical History:   Diagnosis Date    Arthritis     Chronic back pain     Diabetes mellitus ([address]     Fracture of nasal bone     Headache     Hearing loss     High blood pressure     Hyperlipidemia     Wears partial dentures       Past Surgical History:   Procedure Laterality Date    APPENDECTOMY      BACK SURGERY  10/2007   1979  1980    BRONCHOSCOPY      CARPAL TUNNEL RELEASE Bilateral     COLONOSCOPY      ESOPHAGUS SURGERY      HIP ARTHROPLASTY Left 04/04/2017    HIP SURGERY  04/2008    bursa removed    JOINT REPLACEMENT  2009, 2006, 2003, 1997    Left knee replacement    KNEE SURGERY Left     [id number] scopes    SHOULDER ARTHROSCOPY Left 7/26/2019    LEFT SHOULDER ARTHROSCOPY, SUBACROMIAL DECOMPRESSION, ROTATOR CUFF REPAIR performed by Ag Knowles MD at [facility]        Family History   Problem Relation Age of Onset    Heart Disease Mother     High Blood Pressure Mother     Diabetes Mother     Heart Disease Father     High Blood Pressure Father     Diabetes Father       Social History     Tobacco Use    Smoking status: Never    Smokeless tobacco: Never   Vaping Use    Vaping Use: Never used   Substance Use Topics    Alcohol use: No    Drug use: No     Allergies   Allergen Reactions    Prochlorperazine Swelling     Swelling of tongue    Prednisone      Makes him ill. Review of Systems -   Constitutional: Negative for weight gain/loss; malaise, fever  Respiratory: Negative for Asthma;  cough and hemoptysis  Cardiovascular: Negative for palpitations,dizziness   Gastrointestinal: Negative for abd.pain; constipation/diarrhea;    Genitourinary: Negative for stones; hematuria; frequency hesitancy  Integumentt: Negative for rash or pruritis  Hematologic/lymphatic: Negative for blood dyscrasia; leukemia/lymphoma  Musculoskeletal: Negative for Connective tissue disease  Neurological:  Negative for Seizure   Behavioral/Psych:Negative for Bipolar disorder, Schizophrenia; Dementia  Endocrine: negative for thyroid, parathyroid disease    Physical Examination:    BP (!) 100/56   Pulse (!) 48   Ht 5' 8\" (1.727 m)   Wt 241 lb (109.3 kg)   SpO2 92%   BMI 36.64 kg/m²    HEENT:  Face: Atraumatic, Conjunctiva: Pink; non icteric,  Mucous Memb:  Moist, No thyromegaly or Lymphadenopathy  Respiratory:  Resp Assessment: , Resp Auscultation:    Cardiovascular: Auscultation: nl S1 & S2, Palpation:  Nl PMI;  No heaves or thrills, JVP: normal  Abdomen: Soft, non-tender, Normal bowel sounds,  No organomegaly  Extremities: No Cyanosis or Clubbing  Neurological: Oriented to time, place, and person, Non-anxious  Psychiatric: Normal mood and affect  Skin: Warm and dry,  No rash seen     Outpatient Medications Marked as Taking for the 10/7/22 encounter (Office Visit) with Luann Hurtado MD   Medication Sig Dispense Refill    naloxone 4 MG/0.1ML LIQD nasal spray 1 spray by Nasal route as needed      atenolol-chlorthalidone (TENORETIC 50) 50-25 MG per tablet Take 1 tablet by mouth daily 30 tablet 3    torsemide (DEMADEX) 20 MG tablet Take 1 tablet by mouth daily as needed (shortness of breat and/or wheezing.) 30 tablet 3    amLODIPine (NORVASC) 10 MG tablet Take 1 tablet by mouth daily 30 tablet 1    flecainide (TAMBOCOR) 50 MG tablet Take 1 tablet by mouth in the morning and 1 tablet before bedtime. 60 tablet 3    apixaban (ELIQUIS) 5 MG TABS tablet Take 1 tablet by mouth in the morning and 1 tablet before bedtime. 60 tablet 0    pregabalin (LYRICA) 75 MG capsule Take 75 mg by mouth in the morning and 75 mg at noon and 75 mg before bedtime. MULTIPLE VITAMIN PO Take by mouth      nabumetone (RELAFEN) 750 MG tablet Take 750 mg by mouth in the morning and 750 mg before bedtime. tiZANidine (ZANAFLEX) 4 MG tablet Take 4 mg by mouth in the morning and 4 mg at noon and 4 mg before bedtime. oxyCODONE-acetaminophen (PERCOCET) 5-325 MG per tablet Take 1 tablet by mouth every 8 hours as needed for Pain. amitriptyline (ELAVIL) 75 MG tablet Take 75 mg by mouth nightly as needed       zolpidem (AMBIEN) 5 MG tablet Take 5 mg by mouth nightly as needed       atorvastatin (LIPITOR) 20 MG tablet Take 20 mg by mouth nightly            Labs:   No results for input(s): WBC, HGB, HCT, PLT in the last 72 hours. No results for input(s): NA, K, CO2, BUN, CREATININE, LABGLOM in the last 72 hours. No results for input(s): BNP in the last 72 hours.   Lab Results Component Value Date/Time    HDL 47 2022 07:00 AM    LDLCALC 57 2022 07:00 AM    TRIG 47 2022 07:00 AM         EK2022 Sinus rhythm   10/7/22 Marked sinus bradycardia, rate 49      ECHO 2022  Left ventricle - normal size, mild concentric LVH, normal function with EF  of 65%  Aortic valve - sclerotic  Tricuspid valve - trivial regurgitation with RVSP of 22mmHg    Stress test 2022   Normal myocardial perfusion study. Normal LV size and systolic function. Overall findings represent a low risk study. Cardiac cath 2022  Findings  Artery Findings/Result   LM Normal   LAD Normal   Cx Normal   RI N/A   RCA Normal   LVEDP 21   LVG 60%       ASSESSMENT AND PLAN:      Paroxsymal atrial fibrillation  Sinus rhythm on flecainide  Chads Vasc 2 (HTN age). Continue Eliquis and flecainide. Discontinue ASA    Today's ECG shows Marked sinus bradycardia, rate 49. He reports feeling lightheaded/dizzy. For this reason, I will have him stop Tenerotic. Dyspnea  Intermittent. CT of the chest was read as showing pulmonary hypertension but his echo showed RV systolic pressure of 22  Not been a smoker  Systolic heart failure is more likely of his intermittent desaturation  Start him on chlorthalidone which would probably help him with his breathing  Am also giving him torsemide 20 mg to be used for as needed basis    Hypertension  BP is on the low side today  Will reduce amlodipine to 5 mg daily  Stop Tenerotic and instead have him take only chlorthalidone 25 mg daily    Hyperlipidemia  Continue statin therapy. Follow up in 2 months     Thank you very much for allowing me to participate in the care of your patient. Please do not hesitate to contact me if you have any questions.       Sincerely,    Jj North M.D  [facility], [address] Count includes the [facility]  Ph: [phone]  Fax: [phone]    This note was scribed in the presence of Dr. Ronnie Gordon MD by Shireen Bass, RN  Physician Attestation:  The scribes documentation has been prepared under my direction and personally reviewed by me in its entirety. I confirm that the note above accurately reflects all work, treatment, procedures, and medical decision making performed by me.

## 2022-10-17 RX ORDER — TORSEMIDE 20 MG/1
TABLET ORAL
Qty: 30 TABLET | Refills: 3 | OUTPATIENT
Start: 2022-10-17

## 2022-11-02 ENCOUNTER — APPOINTMENT (OUTPATIENT)
Dept: GENERAL RADIOLOGY | Age: 67
End: 2022-11-02
Payer: COMMERCIAL

## 2022-11-02 ENCOUNTER — HOSPITAL ENCOUNTER (EMERGENCY)
Age: 67
Discharge: HOME | End: 2022-11-02
Payer: COMMERCIAL

## 2022-11-02 VITALS
WEIGHT: 224.87 LBS | BODY MASS INDEX: 34.08 KG/M2 | SYSTOLIC BLOOD PRESSURE: 127 MMHG | TEMPERATURE: 98.8 F | DIASTOLIC BLOOD PRESSURE: 73 MMHG | HEART RATE: 73 BPM | HEIGHT: 68 IN | OXYGEN SATURATION: 100 % | RESPIRATION RATE: 18 BRPM

## 2022-11-02 DIAGNOSIS — Z86.79 HISTORY OF ATRIAL FIBRILLATION: ICD-10-CM

## 2022-11-02 DIAGNOSIS — R68.89 ABNORMAL VITAL SIGNS: Primary | ICD-10-CM

## 2022-11-02 DIAGNOSIS — E87.6 HYPOKALEMIA: ICD-10-CM

## 2022-11-02 DIAGNOSIS — Z00.00 ENCOUNTER FOR GENERAL HEALTH EXAMINATION: ICD-10-CM

## 2022-11-02 LAB
A/G RATIO: 1.5 (ref 1.1–2.2)
ALBUMIN SERPL-MCNC: 4.2 G/DL (ref 3.4–5)
ALP BLD-CCNC: 79 U/L (ref 40–129)
ALT SERPL-CCNC: 15 U/L (ref 10–40)
ANION GAP SERPL CALCULATED.3IONS-SCNC: 9 MMOL/L (ref 3–16)
AST SERPL-CCNC: 27 U/L (ref 15–37)
BASOPHILS ABSOLUTE: 0 K/UL (ref 0–0.2)
BASOPHILS RELATIVE PERCENT: 0.6 %
BILIRUB SERPL-MCNC: 0.7 MG/DL (ref 0–1)
BUN BLDV-MCNC: 20 MG/DL (ref 7–20)
CALCIUM SERPL-MCNC: 9.2 MG/DL (ref 8.3–10.6)
CHLORIDE BLD-SCNC: 97 MMOL/L (ref 99–110)
CO2: 32 MMOL/L (ref 21–32)
CREAT SERPL-MCNC: 1.1 MG/DL (ref 0.8–1.3)
EOSINOPHILS ABSOLUTE: 0.2 K/UL (ref 0–0.6)
EOSINOPHILS RELATIVE PERCENT: 4.2 %
GFR SERPL CREATININE-BSD FRML MDRD: >60 ML/MIN/{1.73_M2}
GLUCOSE BLD-MCNC: 155 MG/DL (ref 70–99)
HCT VFR BLD CALC: 33.9 % (ref 40.5–52.5)
HEMOGLOBIN: 11.5 G/DL (ref 13.5–17.5)
LYMPHOCYTES ABSOLUTE: 1.6 K/UL (ref 1–5.1)
LYMPHOCYTES RELATIVE PERCENT: 35.6 %
MAGNESIUM: 1.8 MG/DL (ref 1.8–2.4)
MCH RBC QN AUTO: 31.9 PG (ref 26–34)
MCHC RBC AUTO-ENTMCNC: 34 G/DL (ref 31–36)
MCV RBC AUTO: 93.9 FL (ref 80–100)
MONOCYTES ABSOLUTE: 0.4 K/UL (ref 0–1.3)
MONOCYTES RELATIVE PERCENT: 9.3 %
NEUTROPHILS ABSOLUTE: 2.2 K/UL (ref 1.7–7.7)
NEUTROPHILS RELATIVE PERCENT: 50.3 %
PDW BLD-RTO: 13.8 % (ref 12.4–15.4)
PLATELET # BLD: 174 K/UL (ref 135–450)
PMV BLD AUTO: 9.5 FL (ref 5–10.5)
POTASSIUM REFLEX MAGNESIUM: 3.4 MMOL/L (ref 3.5–5.1)
PRO-BNP: <5 PG/ML (ref 0–124)
RBC # BLD: 3.61 M/UL (ref 4.2–5.9)
SODIUM BLD-SCNC: 138 MMOL/L (ref 136–145)
TOTAL PROTEIN: 7 G/DL (ref 6.4–8.2)
TROPONIN: <0.01 NG/ML
WBC # BLD: 4.4 K/UL (ref 4–11)

## 2022-11-02 PROCEDURE — 83735 ASSAY OF MAGNESIUM: CPT

## 2022-11-02 PROCEDURE — 85025 COMPLETE CBC W/AUTO DIFF WBC: CPT

## 2022-11-02 PROCEDURE — 71045 X-RAY EXAM CHEST 1 VIEW: CPT

## 2022-11-02 PROCEDURE — 84484 ASSAY OF TROPONIN QUANT: CPT

## 2022-11-02 PROCEDURE — 6370000000 HC RX 637 (ALT 250 FOR IP): Performed by: GENERAL ACUTE CARE HOSPITAL

## 2022-11-02 PROCEDURE — 83880 ASSAY OF NATRIURETIC PEPTIDE: CPT

## 2022-11-02 PROCEDURE — 99285 EMERGENCY DEPT VISIT HI MDM: CPT

## 2022-11-02 PROCEDURE — 93005 ELECTROCARDIOGRAM TRACING: CPT | Performed by: GENERAL ACUTE CARE HOSPITAL

## 2022-11-02 PROCEDURE — 80053 COMPREHEN METABOLIC PANEL: CPT

## 2022-11-02 RX ORDER — POTASSIUM CHLORIDE 20 MEQ/1
40 TABLET, EXTENDED RELEASE ORAL ONCE
Status: COMPLETED | OUTPATIENT
Start: 2022-11-02 | End: 2022-11-02

## 2022-11-02 RX ORDER — CHLORTHALIDONE 25 MG/1
TABLET ORAL
Qty: 90 TABLET | Refills: 3 | Status: SHIPPED | OUTPATIENT
Start: 2022-11-02

## 2022-11-02 RX ORDER — AMLODIPINE BESYLATE 5 MG/1
TABLET ORAL
Qty: 90 TABLET | Refills: 3 | Status: SHIPPED | OUTPATIENT
Start: 2022-11-02

## 2022-11-02 RX ADMIN — POTASSIUM CHLORIDE 40 MEQ: 1500 TABLET, EXTENDED RELEASE ORAL at 23:47

## 2022-11-02 ASSESSMENT — ENCOUNTER SYMPTOMS
WHEEZING: 0
NAUSEA: 0
VOICE CHANGE: 0
ABDOMINAL PAIN: 0
SORE THROAT: 0
COUGH: 0
SHORTNESS OF BREATH: 0
BACK PAIN: 0
VOMITING: 0
CHEST TIGHTNESS: 0

## 2022-11-02 ASSESSMENT — PAIN - FUNCTIONAL ASSESSMENT: PAIN_FUNCTIONAL_ASSESSMENT: NONE - DENIES PAIN

## 2022-11-03 LAB
EKG ATRIAL RATE: 72 BPM
EKG DIAGNOSIS: NORMAL
EKG P AXIS: 67 DEGREES
EKG P-R INTERVAL: 212 MS
EKG Q-T INTERVAL: 408 MS
EKG QRS DURATION: 94 MS
EKG QTC CALCULATION (BAZETT): 446 MS
EKG R AXIS: 30 DEGREES
EKG T AXIS: 18 DEGREES
EKG VENTRICULAR RATE: 72 BPM

## 2022-11-03 PROCEDURE — 93010 ELECTROCARDIOGRAM REPORT: CPT | Performed by: INTERNAL MEDICINE

## 2022-11-03 NOTE — DISCHARGE INSTRUCTIONS
It is important that you follow-up with your PCP within 48 hours. Continue taking your current medications just as directed. Increase your fluid intake and be sure to eat at regular intervals. Return for high fever, incessant vomiting, severe pain, or any other worsening symptoms.

## 2022-11-03 NOTE — ED PROVIDER NOTES
[facility]        Pt Name: Buck Child  MRN: [id number]  [place] 1955  Date of evaluation: 11/2/2022  Provider: RHYS López CNP  PCP: Svitlana Puri MD  Note Started: 10:15 PM EDT       BRUCE. I have evaluated this patient. My supervising physician was available for consultation. CHIEF COMPLAINT       Chief Complaint   Patient presents with    Hypotension     States BP low and O2 low; states he measured with his home meter and BP was in low 80's and O2 was 88%; pt is 94-98% on RA; states was started again on lisinopril    Atrial Fibrillation     hx of afib       HISTORY OF PRESENT ILLNESS   (Location, Timing/Onset, Context/Setting, Quality, Duration, Modifying Factors, Severity, Associated Signs and Symptoms)  Note limiting factors. Chief Complaint: Low blood pressure, low oxygen saturations    Buck Child is a 69 y.o. male who presents to the emergency department today from home reporting low blood pressure and low oxygen level. Patient states that he has been feeling fine. He states that he checks his vital signs twice a day and noticed that his blood pressure was in the 05G systolically and states that his oxygen saturation was 88%. Patient is a non-smoker. There is no history of COPD or asthma. He does not wear supplemental oxygen at home. He denies recent travel or known sick contacts. He denies having any lower extremity pain or swelling. He denies hemoptysis. Patient denies history of DVTs or PEs but states that he is anticoagulated on Eliquis for history of atrial fibrillation. He denies having any chest pain, shortness of breath, palpitations, or other symptoms. Patient states \"I feel fine, but my vitals are off so I wanted to get checked out\". Nursing Notes were all reviewed and agreed with or any disagreements were addressed in the HPI.     REVIEW OF SYSTEMS    (2-9 systems for level 4, 10 or more for level 5)     Review of Systems   Constitutional:  Negative for chills, fatigue and fever. HENT:  Negative for congestion, sore throat and voice change. Eyes:  Negative for visual disturbance. Respiratory:  Negative for cough, chest tightness, shortness of breath and wheezing. Cardiovascular:  Negative for chest pain and palpitations. Gastrointestinal:  Negative for abdominal pain, nausea and vomiting. Endocrine: Negative for polydipsia and polyuria. Genitourinary:  Negative for difficulty urinating, dysuria and flank pain. Musculoskeletal:  Negative for back pain, neck pain and neck stiffness. Skin:  Negative for rash and wound. Allergic/Immunologic: Negative for immunocompromised state. Neurological:  Negative for dizziness, weakness and light-headedness. Hematological:  Does not bruise/bleed easily. Psychiatric/Behavioral:  Negative for sleep disturbance and suicidal ideas. Positives and Pertinent negatives as per HPI. Except as noted above in the ROS, all other systems were reviewed and negative.        PAST MEDICAL HISTORY     Past Medical History:   Diagnosis Date    Arthritis     Chronic back pain     Diabetes mellitus ([facility] [address]     Fracture of nasal bone     Headache     Hearing loss     High blood pressure     Hyperlipidemia     Wears partial dentures          SURGICAL HISTORY     Past Surgical History:   Procedure Laterality Date    APPENDECTOMY      BACK SURGERY  10/2007   1979  1980    BRONCHOSCOPY      CARPAL TUNNEL RELEASE Bilateral     COLONOSCOPY      ESOPHAGUS SURGERY      HIP ARTHROPLASTY Left 04/04/2017    HIP SURGERY  04/2008    bursa removed    JOINT REPLACEMENT  2009, 2006, 2003, 1997    Left knee replacement    KNEE SURGERY Left     [id number] scopes    SHOULDER ARTHROSCOPY Left 7/26/2019    LEFT SHOULDER ARTHROSCOPY, SUBACROMIAL DECOMPRESSION, ROTATOR CUFF REPAIR performed by Armando Montana MD at [facility] CURRENTMEDICATIONS       Previous Medications    AMITRIPTYLINE (ELAVIL) 75 MG TABLET    Take 75 mg by mouth nightly as needed     AMLODIPINE (NORVASC) 5 MG TABLET    TAKE 1 TABLET BY MOUTH EVERY DAY    APIXABAN (ELIQUIS) 5 MG TABS TABLET    Take 1 tablet by mouth in the morning and 1 tablet before bedtime. ATORVASTATIN (LIPITOR) 20 MG TABLET    Take 20 mg by mouth nightly     CHLORTHALIDONE (HYGROTON) 25 MG TABLET    TAKE 1 TABLET BY MOUTH EVERY DAY    FLECAINIDE (TAMBOCOR) 50 MG TABLET    Take 1 tablet by mouth in the morning and 1 tablet before bedtime. LORAZEPAM (ATIVAN) 0.5 MG TABLET    PLEASE SEE ATTACHED FOR DETAILED DIRECTIONS    MULTIPLE VITAMIN PO    Take by mouth    NABUMETONE (RELAFEN) 750 MG TABLET    Take 750 mg by mouth in the morning and 750 mg before bedtime. NALOXONE 4 MG/0.1ML LIQD NASAL SPRAY    1 spray by Nasal route as needed    OXYCODONE-ACETAMINOPHEN (PERCOCET) 5-325 MG PER TABLET    Take 1 tablet by mouth every 8 hours as needed for Pain. PREGABALIN (LYRICA) 75 MG CAPSULE    Take 75 mg by mouth in the morning and 75 mg at noon and 75 mg before bedtime. TIZANIDINE (ZANAFLEX) 4 MG TABLET    Take 4 mg by mouth in the morning and 4 mg at noon and 4 mg before bedtime.     TORSEMIDE (DEMADEX) 20 MG TABLET    Take 1 tablet by mouth daily as needed (shortness of breat and/or wheezing.)    ZOLPIDEM (AMBIEN) 5 MG TABLET    Take 5 mg by mouth nightly as needed          ALLERGIES     Prochlorperazine and Prednisone    FAMILYHISTORY       Family History   Problem Relation Age of Onset    Heart Disease Mother     High Blood Pressure Mother     Diabetes Mother     Heart Disease Father     High Blood Pressure Father     Diabetes Father           SOCIAL HISTORY       Social History     Tobacco Use    Smoking status: Never    Smokeless tobacco: Never   Vaping Use    Vaping Use: Never used   Substance Use Topics    Alcohol use: No    Drug use: No       SCREENINGS    Aron Coma Scale  Eye Opening: Spontaneous  Best Verbal Response: Oriented  Best Motor Response: Obeys commands  Aron Coma Scale Score: 15        PHYSICAL EXAM    (up to 7 for level 4, 8 or more for level 5)     ED Triage Vitals [11/02/22 2146]   BP Temp Temp Source Heart Rate Resp SpO2 Height Weight   (!) 102/59 98.3 °F (36.8 °C) Oral 71 23 90 % 5' 8\" (1.727 m) 224 lb 13.9 oz (102 kg)       Physical Exam  Vitals and nursing note reviewed. Constitutional:       General: He is not in acute distress. Appearance: Normal appearance. He is not ill-appearing. HENT:      Head: Normocephalic and atraumatic. Right Ear: External ear normal.      Left Ear: External ear normal.      Nose: Nose normal.      Mouth/Throat:      Mouth: Mucous membranes are moist.      Pharynx: Oropharynx is clear. Eyes:      General:         Right eye: No discharge. Left eye: No discharge. Extraocular Movements: Extraocular movements intact. Conjunctiva/sclera: Conjunctivae normal.      Pupils: Pupils are equal, round, and reactive to light. Cardiovascular:      Rate and Rhythm: Normal rate and regular rhythm. Pulses: Normal pulses. Heart sounds: Normal heart sounds. Pulmonary:      Effort: Pulmonary effort is normal. No respiratory distress. Breath sounds: Normal breath sounds. Abdominal:      General: Bowel sounds are normal.      Palpations: Abdomen is soft. Tenderness: There is no abdominal tenderness. There is no right CVA tenderness or left CVA tenderness. Musculoskeletal:         General: Normal range of motion. Cervical back: Normal range of motion and neck supple. No rigidity or tenderness. Right lower leg: No edema. Left lower leg: No edema. Skin:     General: Skin is warm and dry. Capillary Refill: Capillary refill takes less than 2 seconds. Neurological:      General: No focal deficit present. Mental Status: He is alert and oriented to person, place, and time. Psychiatric:         Mood and Affect: Mood normal.         Behavior: Behavior normal.         Thought Content: Thought content normal.         Judgment: Judgment normal.       DIAGNOSTIC RESULTS   LABS:    Labs Reviewed   CBC WITH AUTO DIFFERENTIAL - Abnormal; Notable for the following components:       Result Value    RBC 3.61 (*)     Hemoglobin 11.5 (*)     Hematocrit 33.9 (*)     All other components within normal limits   COMPREHENSIVE METABOLIC PANEL W/ REFLEX TO MG FOR LOW K - Abnormal; Notable for the following components:    Potassium reflex Magnesium 3.4 (*)     Chloride 97 (*)     Glucose 155 (*)     All other components within normal limits   TROPONIN   BRAIN NATRIURETIC PEPTIDE   MAGNESIUM       When ordered only abnormal lab results are displayed. All other labs were within normal range or not returned as of this dictation. EKG: When ordered, EKG's are interpreted by the Emergency Department Physician in the absence of a cardiologist.  Please see their note for interpretation of EKG. RADIOLOGY:   Non-plain film images such as CT, Ultrasound and MRI are read by the radiologist. Plain radiographic images are visualized and preliminarily interpreted by the ED Provider with the below findings:        Interpretation per the Radiologist below, if available at the time of this note:    XR CHEST PORTABLE   Final Result   Stable chest x-ray. No acute disease. No results found.         PROCEDURES   Unless otherwise noted below, none     Procedures    CRITICAL CARE TIME   none    CONSULTS:  None      EMERGENCY DEPARTMENT COURSE and DIFFERENTIAL DIAGNOSIS/MDM:   Vitals:    Vitals:    11/02/22 2146 11/02/22 2213   BP: (!) 102/59 118/76   Pulse: 71    Resp: 23    Temp: 98.3 °F (36.8 °C)    TempSrc: Oral    SpO2: 90% 98%   Weight: 224 lb 13.9 oz (102 kg)    Height: 5' 8\" (1.727 m)        Patient was given the following medications:  Medications   potassium chloride (KLOR-CON M) extended release tablet 40 mEq (has no administration in time range)         Is this patient to be included in the SEP-1 Core Measure due to severe sepsis or septic shock? No   Exclusion criteria - the patient is NOT to be included for SEP-1 Core Measure due to: Infection is not suspected    Previous records reviewed in order to gain further information regarding patient's PMH as well as his HPI. Nursing notes reviewed. This is a 64-year-old -American male with history of coronary artery disease, atrial fibrillation, and hypertension who presents to the emergency department today after noticing abnormal vital signs at home. Patient states that he has been feeling \"fine\". He states that he checks his vital signs twice a day. He states that this morning his vitals were normal and this evening before bed his blood pressure and oxygen saturation were low. Physical exam complete. Patient arrives nontoxic, afebrile, normotensive. He is not hypoxic. No signs or symptoms of distress noted. Patient adamantly denies having any chest pain, shortness of breath, or palpitations. Patient resting quietly reading a magazine. An EKG was interpreted by ED physician reviewed by myself and is negative for acute ST elevation. Laboratory and imaging studies pending. ED work-up has been unremarkable except for a mild hypokalemia with a potassium of 3.4. Patient given single dose of potassium 40 mEq p.o. Patient reassessed multiple times throughout ED visit and has remained hemodynamically stable. He has been without any evidence of hypotension or hypoxia. He has been talking with spouse and reading his magazine. At this time there is no evidence of any life-threatening or emergent conditions requiring immediate intervention. Low suspicion for CVA, TIA, ACS, or MI. Patient will be discharged with emphasis on close outpatient follow-up. He is encouraged to continue taking his prescribed medications as directed.   He

## 2022-11-03 NOTE — ED NOTES
.Pt discharged at this time. Discharge instructions and medications reviewed,  Questions were answered. PT verbalized understanding. VSS, Afebrile. Follow up appointments were discussed.          Lilly Barton, RN  11/02/22 0127

## 2022-11-03 NOTE — ED PROVIDER NOTES
EKG  The Ekg interpreted by me shows  normal sinus rhythm with a rate of 72  Axis is   Normal  QTc is  normal  Intervals and Durations are unremarkable.       ST Segments: no acute change  No significant change from prior EKG dated 10/7/22         Alba Dhaliwal MD  11/02/22 7249

## 2022-11-07 ENCOUNTER — OFFICE VISIT (OUTPATIENT)
Dept: ORTHOPEDIC SURGERY | Age: 67
End: 2022-11-07
Payer: COMMERCIAL

## 2022-11-07 VITALS — WEIGHT: 224 LBS | BODY MASS INDEX: 33.95 KG/M2 | HEIGHT: 68 IN | RESPIRATION RATE: 18 BRPM

## 2022-11-07 DIAGNOSIS — M25.512 LEFT SHOULDER PAIN, UNSPECIFIED CHRONICITY: Primary | ICD-10-CM

## 2022-11-07 PROCEDURE — G8417 CALC BMI ABV UP PARAM F/U: HCPCS | Performed by: PHYSICIAN ASSISTANT

## 2022-11-07 PROCEDURE — 1036F TOBACCO NON-USER: CPT | Performed by: PHYSICIAN ASSISTANT

## 2022-11-07 PROCEDURE — 3017F COLORECTAL CA SCREEN DOC REV: CPT | Performed by: PHYSICIAN ASSISTANT

## 2022-11-07 PROCEDURE — 1123F ACP DISCUSS/DSCN MKR DOCD: CPT | Performed by: PHYSICIAN ASSISTANT

## 2022-11-07 PROCEDURE — G8484 FLU IMMUNIZE NO ADMIN: HCPCS | Performed by: PHYSICIAN ASSISTANT

## 2022-11-07 PROCEDURE — 99213 OFFICE O/P EST LOW 20 MIN: CPT | Performed by: PHYSICIAN ASSISTANT

## 2022-11-07 PROCEDURE — G8427 DOCREV CUR MEDS BY ELIG CLIN: HCPCS | Performed by: PHYSICIAN ASSISTANT

## 2022-11-08 NOTE — PROGRESS NOTES
Subjective:      Patient ID: Tr Larios is a 69 y.o. male who is here for follow up evaluation of his left shoulder pain. History of 2 prior shoulder surgeries. Denies fever or chills. No trauma. Pain 7-8/10 VAS      Review Of Systems:   Negative for numbness or tingling LUE.      Past Medical History:   Diagnosis Date    Arthritis     Chronic back pain     Diabetes mellitus ([address]     Fracture of nasal bone     Headache     Hearing loss     High blood pressure     Hyperlipidemia     Wears partial dentures        Family History   Problem Relation Age of Onset    Heart Disease Mother     High Blood Pressure Mother     Diabetes Mother     Heart Disease Father     High Blood Pressure Father     Diabetes Father        Past Surgical History:   Procedure Laterality Date    APPENDECTOMY      BACK SURGERY  10/2007   [address] Bilateral     COLONOSCOPY      ESOPHAGUS SURGERY      HIP ARTHROPLASTY Left 04/04/2017    HIP SURGERY  04/2008    bursa removed    JOINT REPLACEMENT  2009, 2006, 2003, 1997    Left knee replacement    KNEE SURGERY Left     [id number] scopes    SHOULDER ARTHROSCOPY Left 7/26/2019    LEFT SHOULDER ARTHROSCOPY, SUBACROMIAL DECOMPRESSION, ROTATOR CUFF REPAIR performed by Ashly Whatley MD at [facility] History     Occupational History    Not on file   Tobacco Use    Smoking status: Never    Smokeless tobacco: Never   Vaping Use    Vaping Use: Never used   Substance and Sexual Activity    Alcohol use: No    Drug use: No    Sexual activity: Not Currently       Current Outpatient Medications   Medication Sig Dispense Refill    amLODIPine (NORVASC) 5 MG tablet TAKE 1 TABLET BY MOUTH EVERY DAY 90 tablet 3    chlorthalidone (HYGROTON) 25 MG tablet TAKE 1 TABLET BY MOUTH EVERY DAY 90 tablet 3    LORazepam (ATIVAN) 0.5 MG tablet PLEASE SEE ATTACHED FOR DETAILED DIRECTIONS      naloxone 4 MG/0.1ML LIQD nasal spray 1 spray by Nasal route as needed      torsemide (DEMADEX) 20 MG tablet Take 1 tablet by mouth daily as needed (shortness of breat and/or wheezing.) 30 tablet 3    flecainide (TAMBOCOR) 50 MG tablet Take 1 tablet by mouth in the morning and 1 tablet before bedtime. 60 tablet 3    apixaban (ELIQUIS) 5 MG TABS tablet Take 1 tablet by mouth in the morning and 1 tablet before bedtime. 60 tablet 0    pregabalin (LYRICA) 75 MG capsule Take 75 mg by mouth in the morning and 75 mg at noon and 75 mg before bedtime. MULTIPLE VITAMIN PO Take by mouth      nabumetone (RELAFEN) 750 MG tablet Take 750 mg by mouth in the morning and 750 mg before bedtime. tiZANidine (ZANAFLEX) 4 MG tablet Take 4 mg by mouth in the morning and 4 mg at noon and 4 mg before bedtime. oxyCODONE-acetaminophen (PERCOCET) 5-325 MG per tablet Take 1 tablet by mouth every 8 hours as needed for Pain. amitriptyline (ELAVIL) 75 MG tablet Take 75 mg by mouth nightly as needed       zolpidem (AMBIEN) 5 MG tablet Take 5 mg by mouth nightly as needed       atorvastatin (LIPITOR) 20 MG tablet Take 20 mg by mouth nightly        No current facility-administered medications for this visit. Objective:     He is alert, oriented x 3, pleasant, well nourished, developed and in no   acute distress. Resp 18   Ht 5' 8\" (1.727 m)   Wt 224 lb (101.6 kg)   BMI 34.06 kg/m²      Examination of the left shoulder: There is no deformity. There is no erythema or soft tissue swelling. There is no significant joint effusion. Deltoid region is  tender to palpation. AC Joint is not tender to palpation. Shoulder Active ROM -      IR to L5 ER w/ arm at side 45. Belly Press Test positive. Bear Hug Test positive. Drop Arm Test negative. Cross Arm Abduction Test positive.        Upper extremities:  5/5 strength of his interosseous muscles, wrist dorsiflexors and volarflexors, biceps, triceps, deltoids, and internal and external rotators of his shoulders, bilaterally. Biceps, triceps, brachial radialis reflexes are 2+, bilaterally. Sensation is intact to light touchfrom C6 to C8. No clonus and negative Waggoner's bilaterally. Examination of the upper extremities shows intact perfusion to all extremities. No cyanosis and digigts are warm to touch, capillary refill is less than 2 seconds. There is no edema noted. Intact skin without lacerations or abrasions, no significant erythema, rashes or skin lesions. X Rays: performed in the office today:   AP, Y and Axillary views of the left shoulder. There is no evidence of fracture or dislocation. The subacromial space is mild  narrowed. There is evidence of AC joint arthritis. There is evidence of mild Gleno-Humeral arthritis. Metallic screw in humeral head. Diagnosis:       ICD-10-CM    1. Left shoulder pain, unspecified chronicity  M25.512 XR SHOULDER LEFT (MIN 2 VIEWS)           Assessment and Plan:       Assessment:  Left shoulder pain due to a combination of mild clinical humeral joint Tritus and probable rotator cuff tendinitis or partial tearing. History of 2 prior left shoulder surgeries. I had an extensive discussion with Mr. Tiara Sanchez regarding the natural history, etiology, and long term consequences of his condition. I have presented reasonable alternatives to the patient's proposed care, treatment, and services. Risks and benefits of the treatment options also reviewed in detail. I have outlined a treatment plan with them. He has had full opportunity to ask his questions. I have answered them all to his satisfaction. I feel that Mr. Tiara Sanchez understands our discussion today. Plan:  The Risks and benefits of corticosteroid injections were discussed today. All questions were answered to his satisfaction. He verbally consented to proceed with intra-articular injection today.       Procedures- Cortisone  Injection  PROCEDURE NOTE:  Pre op Diagnosis: Shoulder pain    With Ofelia Labor permission, his left shoulder was prepped  in standard sterile fashion with Alcohol. 2 cc of 0.25% Marcaine and 1 cc of Kenalog 40 mg was injected into the left lateral subacromial space without difficulty. He  tolerated this well without difficulty. A band-aid was applied. He was advised to ice the shoulder for 15-20 minutes to relieve any injection site related pain. Follow up-    Call or return to clinic if these symptoms worsen or fail to improve as anticipated. Barbara Chisholm PA-C   Senior Physician Assistant   [facility]                                         Disclaimer: This note was generated with use of a verbal recognition program (DRAGON) and an attempt was made to check for errors. It is possible that there are still dictated errors within this office note. If so, please bring any significant errors to my attention for an addendum. All efforts were made to ensure that this office note is accurate.

## 2022-12-06 NOTE — PROGRESS NOTES
[address]  Cardiology Note      Héctor Ambriz  1955, 69 y.o.      CC: \"          Marissa Bridges MD:      HPI:   This is a 69 y.o. male with a past medical history significant for hypertension, hyperlipidemia, and atrial fibrillation. Patient comes for a follow-up visit and has no complaints. Denies any palpitations dizziness shortness of breath or chest pain    Previous note  He presented to the emergency room on 8/20/2022 with worsening dyspnea and confusion. Prior to that admission he was in the emergency room on 8/5/2022 and was found to be in atrial fibrillation. He was then taken to  on 8/29/2022 due to a fall. Developed double vision and evaluated at [facility].          Past Medical History:   Diagnosis Date    Arthritis     Chronic back pain     Diabetes mellitus ([address]     Fracture of nasal bone     Headache     Hearing loss     High blood pressure     Hyperlipidemia     Wears partial dentures       Past Surgical History:   Procedure Laterality Date    APPENDECTOMY      BACK SURGERY  10/2007   1979  1980    BRONCHOSCOPY      CARPAL TUNNEL RELEASE Bilateral     COLONOSCOPY      ESOPHAGUS SURGERY      HIP ARTHROPLASTY Left 04/04/2017    HIP SURGERY  04/2008    bursa removed    JOINT REPLACEMENT  2009, 2006, 2003, 1997    Left knee replacement    KNEE SURGERY Left     [id number] scopes    SHOULDER ARTHROSCOPY Left 7/26/2019    LEFT SHOULDER ARTHROSCOPY, SUBACROMIAL DECOMPRESSION, ROTATOR CUFF REPAIR performed by Murvin Hodgkins, MD at [facility]        Family History   Problem Relation Age of Onset    Heart Disease Mother     High Blood Pressure Mother     Diabetes Mother     Heart Disease Father     High Blood Pressure Father     Diabetes Father       Social History     Tobacco Use    Smoking status: Never    Smokeless tobacco: Never   Vaping Use    Vaping Use: Never used   Substance Use Topics    Alcohol use: No    Drug use: No     Allergies Take 1 tablet by mouth in the morning and 1 tablet before bedtime. 60 tablet 3    apixaban (ELIQUIS) 5 MG TABS tablet Take 1 tablet by mouth in the morning and 1 tablet before bedtime. 60 tablet 0    pregabalin (LYRICA) 75 MG capsule Take 75 mg by mouth in the morning and 75 mg at noon and 75 mg before bedtime. MULTIPLE VITAMIN PO Take by mouth      nabumetone (RELAFEN) 750 MG tablet Take 750 mg by mouth in the morning and 750 mg before bedtime. tiZANidine (ZANAFLEX) 4 MG tablet Take 4 mg by mouth in the morning and 4 mg at noon and 4 mg before bedtime. oxyCODONE-acetaminophen (PERCOCET) 5-325 MG per tablet Take 1 tablet by mouth every 8 hours as needed for Pain. amitriptyline (ELAVIL) 75 MG tablet Take 75 mg by mouth nightly as needed       zolpidem (AMBIEN) 5 MG tablet Take 5 mg by mouth nightly as needed       atorvastatin (LIPITOR) 20 MG tablet Take 20 mg by mouth nightly            Labs:   No results for input(s): WBC, HGB, HCT, PLT in the last 72 hours. No results for input(s): NA, K, CO2, BUN, CREATININE, LABGLOM in the last 72 hours. No results for input(s): BNP in the last 72 hours. Lab Results   Component Value Date/Time    HDL 47 2022 07:00 AM    LDLCALC 57 2022 07:00 AM    TRIG 47 2022 07:00 AM         EK2022 Sinus rhythm   10/7/22 Marked sinus bradycardia, rate 49      ECHO 2022  Left ventricle - normal size, mild concentric LVH, normal function with EF  of 65%  Aortic valve - sclerotic  Tricuspid valve - trivial regurgitation with RVSP of 22mmHg    Stress test 2022   Normal myocardial perfusion study. Normal LV size and systolic function. Overall findings represent a low risk study. Cardiac cath 2022  Findings  Artery Findings/Result   LM Normal   LAD Normal   Cx Normal   RI N/A   RCA Normal   LVEDP 21   LVG 60%       ASSESSMENT AND PLAN:      Paroxsymal atrial fibrillation  Sinus rhythm on flecainide  Chads Vasc 2 (HTN age). Continue Eliquis and flecainide. Discontinue ASA    Today's ECG shows Marked sinus bradycardia, rate 49. He reports feeling lightheaded/dizzy. For this reason, I will have him stop Tenerotic. Dyspnea  Intermittent. CT of the chest was read as showing pulmonary hypertension but his echo showed RV systolic pressure of 22  Not been a smoker  Systolic heart failure is more likely of his intermittent desaturation  Start him on chlorthalidone which would probably help him with his breathing  Am also giving him torsemide 20 mg to be used for as needed basis    Hypertension  BP is on the low side today  Will reduce amlodipine to 5 mg daily  Stop Tenerotic and instead have him take only chlorthalidone 25 mg daily    Hyperlipidemia  Continue statin therapy. Follow up in 2 months     Thank you very much for allowing me to participate in the care of your patient. Please do not hesitate to contact me if you have any questions. Sincerely,    Davis Macario M.D  [facility], [address] [place], [place] [address] [address], [address]ore[address] Dowell [facility] [address]  Ph: [phone]  Fax: [phone]    This note was scribed in the presence of Dr. Davis Macario MD by Leandra Naidu RN  Physician Attestation:  The scribes documentation has been prepared under my direction and personally reviewed by me in its entirety. I confirm that the note above accurately reflects all work, treatment, procedures, and medical decision making performed by me.

## 2022-12-08 ENCOUNTER — OFFICE VISIT (OUTPATIENT)
Dept: CARDIOLOGY CLINIC | Age: 67
End: 2022-12-08
Payer: COMMERCIAL

## 2022-12-08 VITALS
DIASTOLIC BLOOD PRESSURE: 88 MMHG | OXYGEN SATURATION: 94 % | WEIGHT: 236 LBS | SYSTOLIC BLOOD PRESSURE: 112 MMHG | BODY MASS INDEX: 35.88 KG/M2 | HEART RATE: 66 BPM

## 2022-12-08 DIAGNOSIS — E78.2 MIXED HYPERLIPIDEMIA: ICD-10-CM

## 2022-12-08 DIAGNOSIS — I10 ESSENTIAL HYPERTENSION: ICD-10-CM

## 2022-12-08 DIAGNOSIS — R06.09 DOE (DYSPNEA ON EXERTION): ICD-10-CM

## 2022-12-08 DIAGNOSIS — I48.0 PAF (PAROXYSMAL ATRIAL FIBRILLATION) (HCC): Primary | ICD-10-CM

## 2022-12-08 PROCEDURE — 3074F SYST BP LT 130 MM HG: CPT | Performed by: INTERNAL MEDICINE

## 2022-12-08 PROCEDURE — 3078F DIAST BP <80 MM HG: CPT | Performed by: INTERNAL MEDICINE

## 2022-12-08 PROCEDURE — 1123F ACP DISCUSS/DSCN MKR DOCD: CPT | Performed by: INTERNAL MEDICINE

## 2022-12-08 PROCEDURE — 3017F COLORECTAL CA SCREEN DOC REV: CPT | Performed by: INTERNAL MEDICINE

## 2022-12-08 PROCEDURE — G8427 DOCREV CUR MEDS BY ELIG CLIN: HCPCS | Performed by: INTERNAL MEDICINE

## 2022-12-08 PROCEDURE — G8417 CALC BMI ABV UP PARAM F/U: HCPCS | Performed by: INTERNAL MEDICINE

## 2022-12-08 PROCEDURE — 1036F TOBACCO NON-USER: CPT | Performed by: INTERNAL MEDICINE

## 2022-12-08 PROCEDURE — G8484 FLU IMMUNIZE NO ADMIN: HCPCS | Performed by: INTERNAL MEDICINE

## 2022-12-08 PROCEDURE — 99214 OFFICE O/P EST MOD 30 MIN: CPT | Performed by: INTERNAL MEDICINE

## 2022-12-13 ENCOUNTER — HOSPITAL ENCOUNTER (OUTPATIENT)
Age: 67
Discharge: HOME | End: 2022-12-13
Payer: COMMERCIAL

## 2022-12-13 ENCOUNTER — HOSPITAL ENCOUNTER (OUTPATIENT)
Dept: GENERAL RADIOLOGY | Age: 67
Discharge: HOME | End: 2022-12-13
Payer: COMMERCIAL

## 2022-12-13 DIAGNOSIS — M25.532 LEFT WRIST PAIN: ICD-10-CM

## 2022-12-13 PROCEDURE — 73110 X-RAY EXAM OF WRIST: CPT

## 2023-01-03 ENCOUNTER — OFFICE VISIT (OUTPATIENT)
Dept: ORTHOPEDIC SURGERY | Age: 68
End: 2023-01-03
Payer: COMMERCIAL

## 2023-01-03 VITALS — RESPIRATION RATE: 18 BRPM | HEIGHT: 68 IN | WEIGHT: 236 LBS | BODY MASS INDEX: 35.77 KG/M2

## 2023-01-03 DIAGNOSIS — Z96.659 HISTORY OF REVISION OF TOTAL KNEE ARTHROPLASTY: ICD-10-CM

## 2023-01-03 DIAGNOSIS — M25.562 ACUTE PAIN OF LEFT KNEE: Primary | ICD-10-CM

## 2023-01-03 DIAGNOSIS — S80.02XA CONTUSION OF LEFT KNEE, INITIAL ENCOUNTER: ICD-10-CM

## 2023-01-03 DIAGNOSIS — M75.82 ROTATOR CUFF TENDONITIS, LEFT: ICD-10-CM

## 2023-01-03 PROCEDURE — 1123F ACP DISCUSS/DSCN MKR DOCD: CPT | Performed by: PHYSICIAN ASSISTANT

## 2023-01-03 PROCEDURE — 1036F TOBACCO NON-USER: CPT | Performed by: PHYSICIAN ASSISTANT

## 2023-01-03 PROCEDURE — G8427 DOCREV CUR MEDS BY ELIG CLIN: HCPCS | Performed by: PHYSICIAN ASSISTANT

## 2023-01-03 PROCEDURE — G8417 CALC BMI ABV UP PARAM F/U: HCPCS | Performed by: PHYSICIAN ASSISTANT

## 2023-01-03 PROCEDURE — 99213 OFFICE O/P EST LOW 20 MIN: CPT | Performed by: PHYSICIAN ASSISTANT

## 2023-01-03 PROCEDURE — 20610 DRAIN/INJ JOINT/BURSA W/O US: CPT | Performed by: PHYSICIAN ASSISTANT

## 2023-01-03 PROCEDURE — G8484 FLU IMMUNIZE NO ADMIN: HCPCS | Performed by: PHYSICIAN ASSISTANT

## 2023-01-03 PROCEDURE — 3017F COLORECTAL CA SCREEN DOC REV: CPT | Performed by: PHYSICIAN ASSISTANT

## 2023-01-03 RX ORDER — BUPIVACAINE HYDROCHLORIDE 2.5 MG/ML
2 INJECTION, SOLUTION INFILTRATION; PERINEURAL ONCE
Status: COMPLETED | OUTPATIENT
Start: 2023-01-03 | End: 2023-01-03

## 2023-01-03 RX ORDER — TRIAMCINOLONE ACETONIDE 40 MG/ML
40 INJECTION, SUSPENSION INTRA-ARTICULAR; INTRAMUSCULAR ONCE
Status: COMPLETED | OUTPATIENT
Start: 2023-01-03 | End: 2023-01-03

## 2023-01-03 RX ADMIN — BUPIVACAINE HYDROCHLORIDE 5 MG: 2.5 INJECTION, SOLUTION INFILTRATION; PERINEURAL at 11:15

## 2023-01-03 RX ADMIN — TRIAMCINOLONE ACETONIDE 40 MG: 40 INJECTION, SUSPENSION INTRA-ARTICULAR; INTRAMUSCULAR at 11:16

## 2023-01-03 NOTE — PROGRESS NOTES
Subjective:      Patient ID: Bruce Bennett is a 69 y.o. male who is here for follow up evaluation of continued left shoulder pain due to rotator cuff tendinitis, possible rotator cuff tear. Last injection provided on 11/7/2022 with moderate temporary relief. He also would like to be evaluated for left knee pain. Most recent fall 2012 3/20/2022 onto the left knee. Constant anterior knee pain. History of prior revision knee arthroplasty. Review Of Systems:   Negative for fever or chills. Negative for significant numbness or tingling into the lower extremities.      Past Medical History:   Diagnosis Date    Arthritis     Chronic back pain     Diabetes mellitus ([address]     Fracture of nasal bone     Headache     Hearing loss     High blood pressure     Hyperlipidemia     Wears partial dentures        Family History   Problem Relation Age of Onset    Heart Disease Mother     High Blood Pressure Mother     Diabetes Mother     Heart Disease Father     High Blood Pressure Father     Diabetes Father        Past Surgical History:   Procedure Laterality Date    APPENDECTOMY      BACK SURGERY  10/2007   [address] Bilateral     COLONOSCOPY      ESOPHAGUS SURGERY      HIP ARTHROPLASTY Left 04/04/2017    HIP SURGERY  04/2008    bursa removed    JOINT REPLACEMENT  2009, 2006, 2003, 1997    Left knee replacement    KNEE SURGERY Left     [id number] scopes    SHOULDER ARTHROSCOPY Left 7/26/2019    LEFT SHOULDER ARTHROSCOPY, SUBACROMIAL DECOMPRESSION, ROTATOR CUFF REPAIR performed by Toi Mckenzie MD at [facility] History     Occupational History    Not on file   Tobacco Use    Smoking status: Never    Smokeless tobacco: Never   Vaping Use    Vaping Use: Never used   Substance and Sexual Activity    Alcohol use: No    Drug use: No    Sexual activity: Not Currently       Current Outpatient Medications   Medication Sig Dispense Refill    amLODIPine (NORVASC) 5 MG tablet TAKE 1 TABLET BY MOUTH EVERY DAY 90 tablet 3    chlorthalidone (HYGROTON) 25 MG tablet TAKE 1 TABLET BY MOUTH EVERY DAY 90 tablet 3    LORazepam (ATIVAN) 0.5 MG tablet PLEASE SEE ATTACHED FOR DETAILED DIRECTIONS      torsemide (DEMADEX) 20 MG tablet Take 1 tablet by mouth daily as needed (shortness of breat and/or wheezing.) 30 tablet 3    flecainide (TAMBOCOR) 50 MG tablet Take 1 tablet by mouth in the morning and 1 tablet before bedtime. 60 tablet 3    apixaban (ELIQUIS) 5 MG TABS tablet Take 1 tablet by mouth in the morning and 1 tablet before bedtime. 60 tablet 0    pregabalin (LYRICA) 75 MG capsule Take 75 mg by mouth in the morning and 75 mg at noon and 75 mg before bedtime. MULTIPLE VITAMIN PO Take by mouth      nabumetone (RELAFEN) 750 MG tablet Take 750 mg by mouth in the morning and 750 mg before bedtime. tiZANidine (ZANAFLEX) 4 MG tablet Take 4 mg by mouth in the morning and 4 mg at noon and 4 mg before bedtime. oxyCODONE-acetaminophen (PERCOCET) 5-325 MG per tablet Take 1 tablet by mouth every 8 hours as needed for Pain. amitriptyline (ELAVIL) 75 MG tablet Take 75 mg by mouth nightly as needed       zolpidem (AMBIEN) 5 MG tablet Take 5 mg by mouth nightly as needed       atorvastatin (LIPITOR) 20 MG tablet Take 20 mg by mouth nightly        No current facility-administered medications for this visit. Objective:     He is alert, oriented x 3, pleasant, well nourished, developed and in no   acute distress. Resp 18   Ht 5' 8\" (1.727 m)   Wt 236 lb (107 kg)   BMI 35.88 kg/m²      Examination of the left shoulder: There is no deformity. There is no erythema or soft tissue swelling. There is no significant joint effusion. Deltoid region is  tender to palpation. AC Joint is not tender to palpation. Shoulder Active ROM -      IR to L5 ER w/ arm at side 45. Belly Press Test positive. Bear Hug Test positive. Drop Arm Test negative.   Cross Arm Abduction Test positive. Examination of the left knee: Inspection of the skin demonstrates a well-healed midline incision. Inspection of the soft tissues without significant swelling or erythema. The overall alignment of the knee is neutral.  There is minimal intra-articular effusion. AROM     Extension 5     Flexion  110   There mild pain associated with ROM testing. Pes anserine bursa non-tender to palpation. Patellar tendon non-tender to palpation. Quadriceps tendon non-tender to palpation. Collateral ligaments non-tender to palpation. Popliteal fossa non-tender to palpation. Retro patellar crepitus is present. There is no instability with varus/valgus stress testing in full extension or 90 degrees of flexion. There is no instability with anterior drawer testing. Extensor Mechanism is intact. X Rays: performed in the office today:   AP Standing, Lateral and Sunrise Left Knee: There is a left cemeted long stemmed tibial and femoral component arthroplasty present. The alignment is satisfactory. There are no signs of fracture or acute failure or loosening. Diagnosis:       ICD-10-CM    1. Acute pain of left knee  M25.562 XR KNEE LEFT (3 VIEWS)      2. Rotator cuff tendonitis, left  M75.82 GA ARTHROCENTESIS ASPIR&/INJ MAJOR JT/BURSA W/O US     triamcinolone acetonide (KENALOG-40) injection 40 mg     bupivacaine (MARCAINE) 0.25 % injection 5 mg      3. Contusion of left knee, initial encounter  S80. 02XA       4. History of revision of total knee arthroplasty  Z96.659            Assessment and Plan:       Assessment:  Persistent left shoulder pain probable partial rotator cuff tear in addition to high-grade tendinitis. History of prior rotator cuff repair. Contusion left knee from recent fall. History of revision knee arthroplasty. X-rays negative for acute fracture. Plan:    Rest, Ice, Compression and Elevation  Activity restriction/ Modification discussed.    OTC NSAIDS discussed. The most common side effects from NSAIDs are stomachaches, heartburn, and nausea. NSAIDs may irritate the stomach lining. If the medicine upsets your stomach, you can try taking it with food. But if that doesn't help, talk with your doctor to make sure it's not a more serious problem, such as a stomach ulcer or bleeding in the stomach or intestines. Using NSAIDs may:  Lead to high blood pressure. Make symptoms of heart failure worse. Raise the risk of heart attack, stroke, kidney damage, and skin reactions. Your risks are greater if you take NSAIDs at higher doses or for longer than the label says. People who are older than 72 or who have heart, stomach, or intestinal disease have a higher risk for problems. Procedures- Cortisone  Injection  PROCEDURE NOTE:  Pre op Diagnosis: Shoulder pain    With Rhea Monterroso permission, his left shoulder was prepped  in standard sterile fashion with Alcohol. 2 cc of 0.25% Marcaine and 1 cc of Kenalog 40 mg was injected into the left lateral subacromial space without difficulty. He  tolerated this well without difficulty. A band-aid was applied. He was advised to ice the shoulder for 15-20 minutes to relieve any injection site related pain. Follow up-    Call or return to clinic if these symptoms worsen or fail to improve as anticipated. Juan Wooten PA-C   Senior Physician Assistant   [facility]                                         Disclaimer: This note was generated with use of a verbal recognition program (DRAGON) and an attempt was made to check for errors. It is possible that there are still dictated errors within this office note. If so, please bring any significant errors to my attention for an addendum. All efforts were made to ensure that this office note is accurate.

## 2023-02-08 ENCOUNTER — APPOINTMENT (OUTPATIENT)
Dept: CT IMAGING | Age: 68
End: 2023-02-08
Payer: COMMERCIAL

## 2023-02-08 ENCOUNTER — HOSPITAL ENCOUNTER (EMERGENCY)
Age: 68
Discharge: HOME | End: 2023-02-08
Payer: COMMERCIAL

## 2023-02-08 VITALS
OXYGEN SATURATION: 98 % | HEART RATE: 80 BPM | TEMPERATURE: 98.1 F | DIASTOLIC BLOOD PRESSURE: 53 MMHG | WEIGHT: 239.4 LBS | BODY MASS INDEX: 36.4 KG/M2 | SYSTOLIC BLOOD PRESSURE: 101 MMHG | RESPIRATION RATE: 18 BRPM
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
## 2023-02-08 DIAGNOSIS — S09.90XA INJURY OF HEAD, INITIAL ENCOUNTER: ICD-10-CM

## 2023-02-08 PROCEDURE — 99284 EMERGENCY DEPT VISIT MOD MDM: CPT

## 2023-02-08 PROCEDURE — 70450 CT HEAD/BRAIN W/O DYE: CPT

## 2023-02-08 PROCEDURE — 6370000000 HC RX 637 (ALT 250 FOR IP): Performed by: PHYSICIAN ASSISTANT

## 2023-02-08 PROCEDURE — 72125 CT NECK SPINE W/O DYE: CPT

## 2023-02-08 RX ORDER — ACETAMINOPHEN 500 MG
1000 TABLET ORAL ONCE
Status: COMPLETED | OUTPATIENT
Start: 2023-02-08 | End: 2023-02-08

## 2023-02-08 RX ADMIN — ACETAMINOPHEN 1000 MG: 500 TABLET ORAL at 19:28

## 2023-02-08 ASSESSMENT — ENCOUNTER SYMPTOMS
DIARRHEA: 0
COUGH: 0
RHINORRHEA: 0
WHEEZING: 0
NAUSEA: 0
SHORTNESS OF BREATH: 0
VOMITING: 0
ABDOMINAL PAIN: 0

## 2023-02-08 ASSESSMENT — PAIN - FUNCTIONAL ASSESSMENT
PAIN_FUNCTIONAL_ASSESSMENT: 0-10
PAIN_FUNCTIONAL_ASSESSMENT: 0-10
PAIN_FUNCTIONAL_ASSESSMENT: ACTIVITIES ARE NOT PREVENTED

## 2023-02-08 ASSESSMENT — PAIN DESCRIPTION - DESCRIPTORS: DESCRIPTORS: DISCOMFORT

## 2023-02-08 ASSESSMENT — PAIN DESCRIPTION - ORIENTATION: ORIENTATION: LOWER

## 2023-02-08 ASSESSMENT — PAIN SCALES - GENERAL
PAINLEVEL_OUTOF10: 5
PAINLEVEL_OUTOF10: 5
PAINLEVEL_OUTOF10: 6

## 2023-02-08 ASSESSMENT — PAIN DESCRIPTION - PAIN TYPE: TYPE: ACUTE PAIN

## 2023-02-08 ASSESSMENT — PAIN DESCRIPTION - LOCATION: LOCATION: BACK

## 2023-02-09 NOTE — ED PROVIDER NOTES
[facility]        Pt Name: Buster Scott  MRN: [id number]  [place] 1955  Date of evaluation: 2/8/2023  Provider: Leora Hunt PA-C  PCP: Emelyn Mccullough MD  Note Started: 8:24 PM EST 2/8/23      BRUCE. I have evaluated this patient. My supervising physician was available for consultation. CHIEF COMPLAINT       Chief Complaint   Patient presents with    Mojgan Rdz this am and having back pain          HISTORY OF PRESENT ILLNESS: 1 or more Elements     History From: patient  Limitations to history : None    Buster Scott is a 69 y.o. male who presents for evaluation of head injury status post mechanical fall. Patient states that he fell walking down the stairs and hit his head on the wall. He denies loss of consciousness. He does have an abrasion as well. States that his tetanus is up-to-date. States that he is worried as he is not anticoagulated and just wanted to get checked out. He denies any neck or back pain, despite triage note. No numbness tingling or weakness distally. No saddle anesthesia, bladder retention or bowel incontinence. He has no other complaints or concerns at this time    Nursing Notes were all reviewed and agreed with or any disagreements were addressed in the HPI. REVIEW OF SYSTEMS :      Review of Systems   Constitutional:  Negative for appetite change, chills and fever. HENT:  Negative for congestion and rhinorrhea. Eyes:  Negative for visual disturbance. Respiratory:  Negative for cough, shortness of breath and wheezing. Cardiovascular:  Negative for chest pain. Gastrointestinal:  Negative for abdominal pain, diarrhea, nausea and vomiting. Genitourinary:  Negative for difficulty urinating, dysuria and hematuria. Musculoskeletal:  Negative for neck pain and neck stiffness. Skin:  Positive for wound. Negative for rash. Neurological:  Positive for headaches.  Negative for dizziness, syncope, weakness and light-headedness. Positives and Pertinent negatives as per HPI. SURGICAL HISTORY     Past Surgical History:   Procedure Laterality Date    APPENDECTOMY      BACK SURGERY  10/2007   1979  1980    BRONCHOSCOPY      CARPAL TUNNEL RELEASE Bilateral     COLONOSCOPY      ESOPHAGUS SURGERY      HIP ARTHROPLASTY Left 04/04/2017    HIP SURGERY  04/2008    bursa removed    JOINT REPLACEMENT  2009, 2006, 2003, 1997    Left knee replacement    KNEE SURGERY Left     [id number] scopes    SHOULDER ARTHROSCOPY Left 7/26/2019    LEFT SHOULDER ARTHROSCOPY, SUBACROMIAL DECOMPRESSION, ROTATOR CUFF REPAIR performed by Lola Lynch MD at [address]       Discharge Medication List as of 2/8/2023  7:55 PM        CONTINUE these medications which have NOT CHANGED    Details   amLODIPine (NORVASC) 5 MG tablet TAKE 1 TABLET BY MOUTH EVERY DAY, Disp-90 tablet, R-3Normal      chlorthalidone (HYGROTON) 25 MG tablet TAKE 1 TABLET BY MOUTH EVERY DAY, Disp-90 tablet, R-3Normal      LORazepam (ATIVAN) 0.5 MG tablet PLEASE SEE ATTACHED FOR DETAILED DIRECTIONSHistorical Med      torsemide (DEMADEX) 20 MG tablet Take 1 tablet by mouth daily as needed (shortness of breat and/or wheezing.), Disp-30 tablet, R-3Normal      flecainide (TAMBOCOR) 50 MG tablet Take 1 tablet by mouth in the morning and 1 tablet before bedtime. , Disp-60 tablet, R-3Normal      apixaban (ELIQUIS) 5 MG TABS tablet Take 1 tablet by mouth in the morning and 1 tablet before bedtime. , Disp-60 tablet, R-0Normal      pregabalin (LYRICA) 75 MG capsule Take 75 mg by mouth in the morning and 75 mg at noon and 75 mg before bedtime. Historical Med      MULTIPLE VITAMIN PO Take by mouthHistorical Med      nabumetone (RELAFEN) 750 MG tablet Take 750 mg by mouth in the morning and 750 mg before bedtime. Historical Med      tiZANidine (ZANAFLEX) 4 MG tablet Take 4 mg by mouth in the morning and 4 mg at noon and 4 mg before bedtime. Historical Med      oxyCODONE-acetaminophen (PERCOCET) 5-325 MG per tablet Take 1 tablet by mouth every 8 hours as needed for Pain. Historical Med      amitriptyline (ELAVIL) 75 MG tablet Take 75 mg by mouth nightly as needed Historical Med      zolpidem (AMBIEN) 5 MG tablet Take 5 mg by mouth nightly as needed Historical Med      atorvastatin (LIPITOR) 20 MG tablet Take 20 mg by mouth nightly Historical Med             ALLERGIES     Prochlorperazine and Prednisone    FAMILYHISTORY       Family History   Problem Relation Age of Onset    Heart Disease Mother     High Blood Pressure Mother     Diabetes Mother     Heart Disease Father     High Blood Pressure Father     Diabetes Father         SOCIAL HISTORY       Social History     Tobacco Use    Smoking status: Never    Smokeless tobacco: Never   Vaping Use    Vaping Use: Never used   Substance Use Topics    Alcohol use: No    Drug use: No       SCREENINGS        Aron Coma Scale  Eye Opening: Spontaneous  Best Verbal Response: Oriented  Best Motor Response: Obeys commands  Aron Coma Scale Score: 15                CIWA Assessment  BP: (!) 101/53  Heart Rate: 80           PHYSICAL EXAM  1 or more Elements     ED Triage Vitals   BP Temp Temp Source Heart Rate Resp SpO2 Height Weight   02/08/23 1917 02/08/23 1917 02/08/23 1917 02/08/23 1917 02/08/23 1917 02/08/23 1919 -- 02/08/23 1917   (!) 94/43 98.1 °F (36.7 °C) Oral 63 18 98 %  239 lb 6.4 oz (108.6 kg)       Physical Exam  Vitals and nursing note reviewed. Constitutional:       Appearance: He is well-developed. He is not diaphoretic. HENT:      Head: Normocephalic. Abrasion and contusion present. No raccoon eyes, Bender's sign or laceration. Right Ear: External ear normal.      Left Ear: External ear normal.      Nose: Nose normal.   Eyes:      General:         Right eye: No discharge. Left eye: No discharge.    Cardiovascular:      Rate and Rhythm: Normal rate and regular rhythm. Heart sounds: Normal heart sounds. Pulmonary:      Effort: Pulmonary effort is normal. No respiratory distress. Musculoskeletal:         General: Normal range of motion. Cervical back: Normal range of motion and neck supple. Skin:     General: Skin is warm and dry. Neurological:      Mental Status: He is alert and oriented to person, place, and time. Mental status is at baseline. GCS: GCS eye subscore is 4. GCS verbal subscore is 5. GCS motor subscore is 6. Cranial Nerves: Cranial nerves 2-12 are intact. Psychiatric:         Behavior: Behavior normal.         DIAGNOSTIC RESULTS   LABS:    Labs Reviewed - No data to display    When ordered only abnormal lab results are displayed. All other labs were within normal range or not returned as of this dictation. EKG: When ordered, EKG's are interpreted by the Emergency Department Physician in the absence of a cardiologist.  Please see their note for interpretation of EKG. RADIOLOGY:   Non-plain film images such as CT, Ultrasound and MRI are read by the radiologist. Plain radiographic images are visualized and preliminarily interpreted by the ED Provider with the below findings:    Negative     Interpretation per the Radiologist below, if available at the time of this note:    CT CERVICAL SPINE WO CONTRAST   Final Result   No acute abnormality of the cervical spine. CT HEAD WO CONTRAST   Final Result   No acute intracranial abnormality. CT HEAD WO CONTRAST    Result Date: 2/8/2023  EXAMINATION: CT OF THE HEAD WITHOUT CONTRAST  2/8/2023 7:17 pm TECHNIQUE: CT of the head was performed without the administration of intravenous contrast. Automated exposure control, iterative reconstruction, and/or weight based adjustment of the mA/kV was utilized to reduce the radiation dose to as low as reasonably achievable.  COMPARISON: 08/20/2022 HISTORY: ORDERING SYSTEM PROVIDED HISTORY: fall, head injury TECHNOLOGIST PROVIDED HISTORY: Reason for exam:->fall, head injury Has a \"code stroke\" or \"stroke alert\" been called? ->No Decision Support Exception - unselect if not a suspected or confirmed emergency medical condition->Emergency Medical Condition (MA) Reason for Exam: Fall Justice Fill this am and having back pain FINDINGS: BRAIN/VENTRICLES: There is no acute intracranial hemorrhage, mass effect or midline shift. No abnormal extra-axial fluid collection. The gray-white differentiation is maintained without evidence of an acute infarct. There is no evidence of hydrocephalus. Chronic ischemic microvascular changes are similar to the prior exam. ORBITS: The visualized portion of the orbits demonstrate no acute abnormality. SINUSES: The visualized paranasal sinuses and mastoid air cells demonstrate no acute abnormality. SOFT TISSUES/SKULL:  No acute abnormality of the visualized skull or soft tissues. No acute intracranial abnormality. CT CERVICAL SPINE WO CONTRAST    Result Date: 2/8/2023  EXAMINATION: CT OF THE CERVICAL SPINE WITHOUT CONTRAST 2/8/2023 7:17 pm TECHNIQUE: CT of the cervical spine was performed without the administration of intravenous contrast. Multiplanar reformatted images are provided for review. Automated exposure control, iterative reconstruction, and/or weight based adjustment of the mA/kV was utilized to reduce the radiation dose to as low as reasonably achievable. COMPARISON: 06/06/2019 HISTORY: ORDERING SYSTEM PROVIDED HISTORY: fall, head injury TECHNOLOGIST PROVIDED HISTORY: Reason for exam:->fall, head injury Decision Support Exception - unselect if not a suspected or confirmed emergency medical condition->Emergency Medical Condition (MA) Reason for Exam: Fall Justice Fill this am and having back pain FINDINGS: BONES/ALIGNMENT: There is no acute fracture or traumatic malalignment. DEGENERATIVE CHANGES: Multilevel degenerative changes. SOFT TISSUES: There is no prevertebral soft tissue swelling.      No acute abnormality of the cervical spine. No results found. PROCEDURES   Unless otherwise noted below, none     Procedures    CRITICAL CARE TIME (.cctime)       PAST MEDICAL HISTORY      has a past medical history of Arthritis, Chronic back pain, Diabetes mellitus ([address] Fracture of nasal bone, Headache, Hearing loss, High blood pressure, Hyperlipidemia, and Wears partial dentures. EMERGENCY DEPARTMENT COURSE and DIFFERENTIAL DIAGNOSIS/MDM:   Vitals:    Vitals:    02/08/23 1917 02/08/23 1919 02/08/23 1945   BP: (!) 94/43  (!) 101/53   Pulse: 63  80   Resp: 18     Temp: 98.1 °F (36.7 °C)     TempSrc: Oral     SpO2:  98%    Weight: 239 lb 6.4 oz (108.6 kg)         Patient was given the following medications:  Medications   acetaminophen (TYLENOL) tablet 1,000 mg (1,000 mg Oral Given 2/8/23 1928)             Is this patient to be included in the SEP-1 Core Measure due to severe sepsis or septic shock? No   Exclusion criteria - the patient is NOT to be included for SEP-1 Core Measure due to: Infection is not suspected    Chronic Conditions affecting care:    has a past medical history of Arthritis, Chronic back pain, Diabetes mellitus ([address] Fracture of nasal bone, Headache, Hearing loss, High blood pressure, Hyperlipidemia, and Wears partial dentures. CONSULTS: (Who and What was discussed)  None      Social Determinants Significantly Affecting Health : None    Records Reviewed (External and Source) n/a    CC/HPI Summary, DDx, ED Course, and Reassessment: Patient presents for evaluation of head injury status post mechanical fall. On exam, he is resting comfortably in bed no acute distress nontoxic. Vitals are stable and he is afebrile. He is alert and oriented x3. GCS 15. Cranial nerves II to XII are intact. Who reported contusion to right frontal scalp and abrasion to right nasolabial fold. No large laceration. No active bleeding. Tetanus is up-to-date.   Area was cleaned and dressed by nursing staff.  Scalp is otherwise atraumatic, neck is nontender he was given Tylenol for symptomatic relief and will be reevaluated. Disposition Considerations (tests considered but not done, Admit vs D/C, Shared Decision Making, Pt Expectation of Test or Tx.): CT of the head shows no acute intracranial normalities. CT of the cervical spine shows no acute abnormalities. I estimate there is LOW risk for SKULL FRACTURE, INTRACRANIAL HEMORRHAGE, CERVICAL SPINE INJURY, SUBDURAL OR EPIDURAL HEMATOMA,     I completed a structured, evidence-based clinical evaluation to screen for neurologic deficits in this patient. The patient has a normal detailed neurologic exam    I feel the patient can be safely discharged to home with outpatient follow up. Instructions have been given for the patient to return if there is any significant worsening of the headache or the development of confusion, vision change, weakness, numbness, difficulty with speech or walking, or any other concerns. He is agreeable with this plan and stable for discharge at this time. I am the Primary Clinician of Record. FINAL IMPRESSION      1. Fall, initial encounter    2.  Injury of head, initial encounter          DISPOSITION/PLAN     DISPOSITION Decision To Discharge 02/08/2023 07:54:07 PM      PATIENT REFERRED TO:  Kelsea Garcia MD  [address] [address]  [phone]    Call   For a re-check as needed    [facility] Emergency Department  [address] [place] [address]  [phone]  Go to   If symptoms worsen      DISCHARGE MEDICATIONS:  Discharge Medication List as of 2/8/2023  7:55 PM          DISCONTINUED MEDICATIONS:  Discharge Medication List as of 2/8/2023  7:55 PM        STOP taking these medications       metoprolol succinate (TOPROL XL) 50 MG extended release tablet Comments:   Reason for Stopping:         glipiZIDE (GLUCOTROL) 5 MG tablet Comments:   Reason for Stopping:         metformin (GLUCOPHAGE) 500 MG tablet Comments:   Reason for Stopping:                      (Please note that portions of this note were completed with a voice recognition program.  Efforts were made to edit the dictations but occasionally words are mis-transcribed.)    Aminata Alva PA-C (electronically signed)           Amy Salas [place]  02/08/23 2028

## 2023-03-09 ENCOUNTER — OFFICE VISIT (OUTPATIENT)
Dept: ORTHOPEDIC SURGERY | Age: 68
End: 2023-03-09

## 2023-03-09 VITALS — WEIGHT: 239 LBS | BODY MASS INDEX: 35.4 KG/M2 | RESPIRATION RATE: 16 BRPM | HEIGHT: 69 IN

## 2023-03-09 DIAGNOSIS — M75.82 ROTATOR CUFF TENDONITIS, LEFT: Primary | ICD-10-CM

## 2023-03-09 DIAGNOSIS — F40.240 CLAUSTROPHOBIA: ICD-10-CM

## 2023-03-09 RX ORDER — DIAZEPAM 10 MG/1
10 TABLET ORAL PRN
Qty: 2 TABLET | Refills: 0 | Status: SHIPPED | OUTPATIENT
Start: 2023-03-09 | End: 2023-04-06

## 2023-03-09 NOTE — PROGRESS NOTES
Subjective:      Patient ID: Angela Gray is a 69 y.o. male who is here for follow up evaluation of his left shoulder pain. He states he recent left shoulder subacromial steroid injection performed 1/3/2023 provided very little relief of his left shoulder pain. He was changing light bulbs overhead and his pain has become more intense. Today he rates his pain 9/10 VAS. Does have a history of a prior left shoulder arthroscopy 7/26/2019. At that time he was noted to have a small high-grade partial rotator cuff tear. Review Of Systems:   He denies numbness or tingling at this time.      Past Medical History:   Diagnosis Date    Arthritis     Chronic back pain     Diabetes mellitus ([address]     Fracture of nasal bone     Headache     Hearing loss     High blood pressure     Hyperlipidemia     Wears partial dentures        Family History   Problem Relation Age of Onset    Heart Disease Mother     High Blood Pressure Mother     Diabetes Mother     Heart Disease Father     High Blood Pressure Father     Diabetes Father        Past Surgical History:   Procedure Laterality Date    APPENDECTOMY      BACK SURGERY  10/2007   [address] Bilateral     COLONOSCOPY      ESOPHAGUS SURGERY      HIP ARTHROPLASTY Left 04/04/2017    HIP SURGERY  04/2008    bursa removed    JOINT REPLACEMENT  2009, 2006, 2003, 1997    Left knee replacement    KNEE SURGERY Left     [id number] scopes    SHOULDER ARTHROSCOPY Left 7/26/2019    LEFT SHOULDER ARTHROSCOPY, SUBACROMIAL DECOMPRESSION, ROTATOR CUFF REPAIR performed by Keith Ordaz MD at [address] [place] [address] History     Occupational History    Not on file   Tobacco Use    Smoking status: Never    Smokeless tobacco: Never   Vaping Use    Vaping Use: Never used   Substance and Sexual Activity    Alcohol use: No    Drug use: No    Sexual activity: Not Currently       Current Outpatient Medications   Medication Sig Dispense Refill    diazePAM (VALIUM) 10 MG tablet Take 1 tablet by mouth as needed for Anxiety (may take 45 mins prior to MRI. May repeat x 1 as needed.) for up to 2 doses. 2 tablet 0    amLODIPine (NORVASC) 5 MG tablet TAKE 1 TABLET BY MOUTH EVERY DAY 90 tablet 3    chlorthalidone (HYGROTON) 25 MG tablet TAKE 1 TABLET BY MOUTH EVERY DAY 90 tablet 3    LORazepam (ATIVAN) 0.5 MG tablet PLEASE SEE ATTACHED FOR DETAILED DIRECTIONS      torsemide (DEMADEX) 20 MG tablet Take 1 tablet by mouth daily as needed (shortness of breat and/or wheezing.) 30 tablet 3    flecainide (TAMBOCOR) 50 MG tablet Take 1 tablet by mouth in the morning and 1 tablet before bedtime. 60 tablet 3    apixaban (ELIQUIS) 5 MG TABS tablet Take 1 tablet by mouth in the morning and 1 tablet before bedtime. 60 tablet 0    pregabalin (LYRICA) 75 MG capsule Take 75 mg by mouth in the morning and 75 mg at noon and 75 mg before bedtime. MULTIPLE VITAMIN PO Take by mouth      nabumetone (RELAFEN) 750 MG tablet Take 750 mg by mouth in the morning and 750 mg before bedtime. tiZANidine (ZANAFLEX) 4 MG tablet Take 4 mg by mouth in the morning and 4 mg at noon and 4 mg before bedtime. oxyCODONE-acetaminophen (PERCOCET) 5-325 MG per tablet Take 1 tablet by mouth every 8 hours as needed for Pain. amitriptyline (ELAVIL) 75 MG tablet Take 75 mg by mouth nightly as needed       zolpidem (AMBIEN) 5 MG tablet Take 5 mg by mouth nightly as needed       atorvastatin (LIPITOR) 20 MG tablet Take 20 mg by mouth nightly        No current facility-administered medications for this visit. Objective:     He is alert, oriented x 3, pleasant, well nourished, developed and in no   acute distress. Resp 16   Ht 5' 9\" (1.753 m)   Wt 239 lb (108.4 kg)   BMI 35.29 kg/m²      Examination of the left shoulder: There is no deformity. There is no erythema or soft tissue swelling. No joint effusion. Deltoid region is  tender to palpation.   [facility] Joint is not tender to palpation. Shoulder Active ROM -      IR to L5 ER w/ arm at side 40.  4/5 strength with supraspinatus, external, internal rotation. Belly Press Test positive. Bear Hug Test positive. Drop Arm Test negative. Cross Arm Abduction Test negative. X Rays: performed in the office today:   Left Shoulder X-Ray: AP, Y and Axillary views obtained and demonstrate :  No significant glenohumeral joint arthritis. Small metallic screw within the humeral head from previous rotator cuff repair. Minimal head elevation. Postsurgical changes noted of the distal clavicle and acromion. Diagnosis:       ICD-10-CM    1. Rotator cuff tendonitis, left  M75.82 XR SHOULDER LEFT (MIN 2 VIEWS)     MRI SHOULDER LEFT WO CONTRAST     diazePAM (VALIUM) 10 MG tablet      2. Claustrophobia  F40.240 diazePAM (VALIUM) 10 MG tablet           Assessment and Plan:       Assessment:  Persistent left shoulder pain, possible. Tear of the rotator cuff left shoulder. No improvement with recent subacromial steroid injection and home therapy. I had an extensive discussion with Mr. Rogelio Turner regarding the natural history, etiology, and long term consequences of his condition. I have presented reasonable alternatives to the patient's proposed care, treatment, and services. Risks and benefits of the treatment options also reviewed in detail. I have outlined a treatment plan with them. He has had full opportunity to ask his questions. I have answered them all to his satisfaction. I feel that Mr. Rogelio Turner understands our discussion today. Plan:  Medications-Valium for claustrophobia. Work  Controlled substances monitoring: possible medication side effects, risk of tolerance and/or dependence, and alternative treatments discussed, no signs of potential drug abuse or diversion identified, and OARRS report reviewed today- activity consistent with treatment plan.      Further Imaging-MRI left shoulder. Instructed to call office after MRI to discuss results. Follow up-    Call or return to clinic if these symptoms worsen or fail to improve as anticipated. Marina Phillipsr RODERICK   Senior Physician Assistant   [facility]                                         Disclaimer: This note was generated with use of a verbal recognition program (DRAGON) and an attempt was made to check for errors. It is possible that there are still dictated errors within this office note. If so, please bring any significant errors to my attention for an addendum. All efforts were made to ensure that this office note is accurate.

## 2023-03-10 ENCOUNTER — TELEPHONE (OUTPATIENT)
Dept: ORTHOPEDIC SURGERY | Age: 68
End: 2023-03-10

## 2023-03-10 NOTE — TELEPHONE ENCOUNTER
General Question     Subject: PATIENT IS NEEDING L SHOULDER MRI ORDER SENT TO [address] [place] [address] ON [address]    PLEASE ADVISE     Patient: Zane Marin  Contact Number: [phone]

## 2023-04-03 ENCOUNTER — OFFICE VISIT (OUTPATIENT)
Dept: ORTHOPEDIC SURGERY | Age: 68
End: 2023-04-03
Payer: COMMERCIAL

## 2023-04-03 VITALS — RESPIRATION RATE: 18 BRPM | BODY MASS INDEX: 35.4 KG/M2 | HEIGHT: 69 IN | WEIGHT: 239 LBS

## 2023-04-03 DIAGNOSIS — M75.02 ADHESIVE CAPSULITIS OF LEFT SHOULDER: Primary | ICD-10-CM

## 2023-04-03 DIAGNOSIS — S46.012D TRAUMATIC INCOMPLETE TEAR OF LEFT ROTATOR CUFF, SUBSEQUENT ENCOUNTER: ICD-10-CM

## 2023-04-03 PROBLEM — S46.012A TRAUMATIC INCOMPLETE TEAR OF LEFT ROTATOR CUFF: Status: ACTIVE | Noted: 2023-04-03

## 2023-04-03 PROCEDURE — 3017F COLORECTAL CA SCREEN DOC REV: CPT | Performed by: PHYSICIAN ASSISTANT

## 2023-04-03 PROCEDURE — 1036F TOBACCO NON-USER: CPT | Performed by: PHYSICIAN ASSISTANT

## 2023-04-03 PROCEDURE — G8417 CALC BMI ABV UP PARAM F/U: HCPCS | Performed by: PHYSICIAN ASSISTANT

## 2023-04-03 PROCEDURE — G8428 CUR MEDS NOT DOCUMENT: HCPCS | Performed by: PHYSICIAN ASSISTANT

## 2023-04-03 PROCEDURE — 99213 OFFICE O/P EST LOW 20 MIN: CPT | Performed by: PHYSICIAN ASSISTANT

## 2023-04-03 PROCEDURE — 1123F ACP DISCUSS/DSCN MKR DOCD: CPT | Performed by: PHYSICIAN ASSISTANT

## 2023-04-04 NOTE — PROGRESS NOTES
Subjective:      Patient ID: Bianca Davis is a 69 y.o. male who is here for follow up evaluation of his left shoulder pain/ mri results. Review Of Systems:   Constitutional: denies fever, chills, weight loss. MSK: denies pain in other joints, muscle aches. Neurological: denies numbness, tingling, weakness. Past Medical History:   Diagnosis Date    Arthritis     Chronic back pain     Diabetes mellitus ([address]     Fracture of nasal bone     Headache     Hearing loss     High blood pressure     Hyperlipidemia     Wears partial dentures        Family History   Problem Relation Age of Onset    Heart Disease Mother     High Blood Pressure Mother     Diabetes Mother     Heart Disease Father     High Blood Pressure Father     Diabetes Father        Past Surgical History:   Procedure Laterality Date    APPENDECTOMY      BACK SURGERY  10/2007   [address] Bilateral     COLONOSCOPY      ESOPHAGUS SURGERY      HIP ARTHROPLASTY Left 04/04/2017    HIP SURGERY  04/2008    bursa removed    JOINT REPLACEMENT  2009, 2006, 2003, 1997    Left knee replacement    KNEE SURGERY Left     [id number] scopes    SHOULDER ARTHROSCOPY Left 7/26/2019    LEFT SHOULDER ARTHROSCOPY, SUBACROMIAL DECOMPRESSION, ROTATOR CUFF REPAIR performed by Antwon Garcia MD at [address] [place] [address] History     Occupational History    Not on file   Tobacco Use    Smoking status: Never    Smokeless tobacco: Never   Vaping Use    Vaping Use: Never used   Substance and Sexual Activity    Alcohol use: No    Drug use: No    Sexual activity: Not Currently       Current Outpatient Medications   Medication Sig Dispense Refill    diazePAM (VALIUM) 10 MG tablet Take 1 tablet by mouth as needed for Anxiety (may take 45 mins prior to MRI. May repeat x 1 as needed.) for up to 2 doses.  2 tablet 0    amLODIPine (NORVASC) 5 MG tablet TAKE 1 TABLET BY MOUTH EVERY DAY 90 tablet 3    chlorthalidone (HYGROTON)

## 2023-04-18 ENCOUNTER — HOSPITAL ENCOUNTER (OUTPATIENT)
Dept: PHYSICAL THERAPY | Age: 68
Setting detail: THERAPIES SERIES
Discharge: HOME | End: 2023-04-18
Payer: COMMERCIAL

## 2023-04-18 PROCEDURE — 97140 MANUAL THERAPY 1/> REGIONS: CPT

## 2023-04-18 PROCEDURE — 97110 THERAPEUTIC EXERCISES: CPT

## 2023-04-18 NOTE — FLOWSHEET NOTE
Patient limited by pain  [] Patient limited by other medical complications  [] Other:     Overall Progression Towards Functional goals/ Treatment Progress Update:  [] Patient is progressing as expected towards functional goals listed. [] Progression is slowed due to complexities/Impairments listed. [] Progression has been slowed due to co-morbidities. [x] Plan just implemented, too soon to assess goals progression <30days   [] Goals require adjustment due to lack of progress  [] Patient is not progressing as expected and requires additional follow up with physician  [] Other    Prognosis for POC: [x] Good [] Fair  [] Poor    Patient requires continued skilled intervention: [x] Yes  [] No      PLAN: See eval  [] Continue per plan of care [] Alter current plan (see comments)  [x] Plan of care initiated [] Hold pending MD visit [] Discharge    Electronically signed by: Jerry Stringer, PT     Note: If patient does not return for scheduled/recommended follow up visits, this note will serve as a discharge from care along with the most recent update on progress.

## 2023-04-20 ENCOUNTER — HOSPITAL ENCOUNTER (OUTPATIENT)
Dept: PHYSICAL THERAPY | Age: 68
Setting detail: THERAPIES SERIES
Discharge: HOME | End: 2023-04-20
Payer: COMMERCIAL

## 2023-04-20 PROCEDURE — 97110 THERAPEUTIC EXERCISES: CPT

## 2023-04-20 PROCEDURE — 97140 MANUAL THERAPY 1/> REGIONS: CPT

## 2023-04-20 NOTE — FLOWSHEET NOTE
[facility]    Physical Therapy Treatment Note/ Progress Report:     Date:  2023    Patient Name:  Virginia Raya    :  1955  MRN: [id number]  Medical Diagnosis:  Adhesive capsulitis of left shoulder [M75.02]  Treatment Diagnosis:    ICD-10-CM    1. Chronic left shoulder pain  M25.512     G89.29       2. Shoulder impingement syndrome, left  M75.42       3. Bad posture  R29.3         Insurance/Certification information:  PT Insurance Information: [facility] CHOICE PLUS / NO DED / $30 COPAY / Wyline Outhouse  Physician Information:  JOSE ANTONIO Manzo   Plan of care signed (Y/N): []  Yes [x]  No  Date sent: 23    Date of Patient follow up with Physician:      Progress Report: []  Yes [x]  No     Functional Scale:    Date assessed:  SPADI 87/130    23    Date Range for reporting period:  Beginnin23  Ending:      Progress report due (10 Rx/or 30 days whichever is less): 3/17/77     Recertification due (POC duration/ or 90 days whichever is less): 23     Visit # Insurance Allowable Auth required? Date Range   3 BOMN []  Yes[x]  No      Pain level:  1/10     SUBJECTIVE:  Patient reports noticing an increase in his right shoulder discomfort since our last therapy visit. States he has not done anything to aggravate his right shoulder    OBJECTIVE: Skilled care provided in flow sheet below; Observation: Heavy use of time spent on reviewing home exercise. 23: Patient unable to obtain full erected posturing secondary to lumbothoracic hypomobility and chronic degenerative changes.   Test measurements:      RESTRICTIONS/PRECAUTIONS: Left shoulder pain, History of RTC repair +10 years ago    Exercises/Interventions:   Therapeutic Ex  20' Wt / Resistance Sets/sec Reps Notes   Pulley's  3'     Supine wand flexion  1 x15 Verbal cues   Supine wand ER  1 x15 Heavy tactile cues   Supine DB press up 4# 1 x15    Supine DB flexion

## 2023-04-25 ENCOUNTER — HOSPITAL ENCOUNTER (OUTPATIENT)
Dept: PHYSICAL THERAPY | Age: 68
Setting detail: THERAPIES SERIES
Discharge: HOME | End: 2023-04-25
Payer: COMMERCIAL

## 2023-04-25 PROCEDURE — 97110 THERAPEUTIC EXERCISES: CPT

## 2023-04-25 PROCEDURE — 97140 MANUAL THERAPY 1/> REGIONS: CPT

## 2023-04-25 NOTE — FLOWSHEET NOTE
[facility]    Physical Therapy Treatment Note/ Progress Report:     Date:  2023    Patient Name:  Sanam Vivar    :  1955  MRN: [id number]  Medical Diagnosis:  Adhesive capsulitis of left shoulder [M75.02]  Treatment Diagnosis:    ICD-10-CM    1. Chronic left shoulder pain  M25.512     G89.29       2. Shoulder impingement syndrome, left  M75.42       3. Bad posture  R29.3         Insurance/Certification information:  PT Insurance Information: [facility] CHOICE PLUS / NO DED / $30 COPAY / Katie Baker  Physician Information:  JOSE ANTONIO Woods   Plan of care signed (Y/N): []  Yes [x]  No  Date sent: 23    Date of Patient follow up with Physician:      Progress Report: []  Yes [x]  No     Functional Scale:    Date assessed:  SPADI 87/130    23    Date Range for reporting period:  Beginnin23  Ending:      Progress report due (10 Rx/or 30 days whichever is less): 08     Recertification due (POC duration/ or 90 days whichever is less): 23     Visit # Insurance Allowable Auth required? Date Range   4 BOMN []  Yes[x]  No      Pain level:  1/10     SUBJECTIVE:  Patient reports tolerating last session well but had increased symptoms on Saturday with no known SHARONA. OBJECTIVE: Skilled care provided in flow sheet below; Observation: Heavy use of time spent on reviewing home exercise. 23: Patient unable to obtain full erected posturing secondary to lumbothoracic hypomobility and chronic degenerative changes.   Test measurements:      RESTRICTIONS/PRECAUTIONS: Left shoulder pain, History of RTC repair +10 years ago    Exercises/Interventions:   Therapeutic Ex  20' Wt / Resistance Sets/sec Reps Notes   Pulley's  3'     Supine wand flexion  1 x15 Verbal cues   Supine wand ER  1 x15 Heavy tactile cues   Supine DB press up 5# 2 x15    Supine DB flexion (full ROM) 2# 2 x15    SL abduction 1# 2 x15 Verbal cues   SL

## 2023-04-27 ENCOUNTER — HOSPITAL ENCOUNTER (OUTPATIENT)
Dept: PHYSICAL THERAPY | Age: 68
Setting detail: THERAPIES SERIES
Discharge: HOME | End: 2023-04-27
Payer: COMMERCIAL

## 2023-04-27 PROCEDURE — 97140 MANUAL THERAPY 1/> REGIONS: CPT

## 2023-04-27 PROCEDURE — 97110 THERAPEUTIC EXERCISES: CPT

## 2023-04-27 NOTE — FLOWSHEET NOTE
[facility]    Physical Therapy Treatment Note/ Progress Report:     Date:  2023    Patient Name:  Lindsey Hall    :  1955  MRN: [id number]  Medical Diagnosis:  Adhesive capsulitis of left shoulder [M75.02]  Treatment Diagnosis:    ICD-10-CM    1. Chronic left shoulder pain  M25.512     G89.29       2. Shoulder impingement syndrome, left  M75.42       3. Bad posture  R29.3         Insurance/Certification information:  PT Insurance Information: [facility] CHOICE PLUS / NO DED / $30 COPAY / Zacarias Solen  Physician Information:  JOSE ANTONIO Zuleta   Plan of care signed (Y/N): []  Yes [x]  No  Date sent: 23    Date of Patient follow up with Physician:      Progress Report: []  Yes [x]  No     Functional Scale:    Date assessed:  SPADI 87/130    23    Date Range for reporting period:  Beginnin23  Ending:      Progress report due (10 Rx/or 30 days whichever is less):      Recertification due (POC duration/ or 90 days whichever is less): 23     Visit # Insurance Allowable Auth required? Date Range   5 BOMN []  Yes[x]  No      Pain level:  0/10, soreness     SUBJECTIVE:  Patient reports increased bilateral shoulder soreness following last session. States R shoulder is more sore than L. 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 OBJECTIVE: Skilled care provided in flow sheet below; Observation:   23: Patient unable to obtain full erected posturing secondary to lumbothoracic hypomobility and chronic degenerative changes.   Test measurements:      RESTRICTIONS/PRECAUTIONS: Left shoulder pain, History of RTC repair +10 years ago    Exercises/Interventions:   Therapeutic Ex  28' Wt / Resistance Sets/sec Reps Notes   Pulley's  3'     Supine wand press up into flexion (full ROM) 3# 1 x15    Supine wand ER  1 x15 Heavy tactile cues   Supine DB press up 5# 2 x15    Supine DB flexion (full ROM) 2#

## 2023-05-02 ENCOUNTER — HOSPITAL ENCOUNTER (OUTPATIENT)
Dept: PHYSICAL THERAPY | Age: 68
Setting detail: THERAPIES SERIES
Discharge: HOME | End: 2023-05-02
Payer: COMMERCIAL

## 2023-05-02 PROCEDURE — 97140 MANUAL THERAPY 1/> REGIONS: CPT

## 2023-05-02 PROCEDURE — 97110 THERAPEUTIC EXERCISES: CPT

## 2023-05-02 NOTE — FLOWSHEET NOTE
[facility]    Physical Therapy Treatment Note/ Progress Report:     Date:  2023    Patient Name:  Kinga Ortiz    :  1955  MRN: [id number]  Medical Diagnosis:  Adhesive capsulitis of left shoulder [M75.02]  Treatment Diagnosis:    ICD-10-CM    1. Chronic left shoulder pain  M25.512     G89.29       2. Shoulder impingement syndrome, left  M75.42       3. Bad posture  R29.3         Insurance/Certification information:  PT Insurance Information: [facility] CHOICE PLUS / NO DED / $30 COPAY / Cherylle Pila  Physician Information:  JOSE ANTONIO Buck   Plan of care signed (Y/N): []  Yes [x]  No  Date sent: 23    Date of Patient follow up with Physician:      Progress Report: []  Yes [x]  No     Functional Scale:    Date assessed:  SPADI 87/130    23    Date Range for reporting period:  Beginnin23  Ending:      Progress report due (10 Rx/or 30 days whichever is less):      Recertification due (POC duration/ or 90 days whichever is less): 23     Visit # Insurance Allowable Auth required? Date Range   6 BOMN []  Yes[x]  No      Pain level:  0/10, soreness     SUBJECTIVE:  Patient reports pain improvement in L shoulder with increased pain and discomfort in R shoulder. He states he was unable to lift R shoulder much over weekend. OBJECTIVE: Skilled care provided in flow sheet below; Observation:   23: Patient unable to obtain full erected posturing secondary to lumbothoracic hypomobility and chronic degenerative changes.   Test measurements:      RESTRICTIONS/PRECAUTIONS: Left shoulder pain, History of RTC repair +10 years ago    Exercises/Interventions:   Therapeutic Ex  28' Wt / Resistance Sets/sec Reps Notes   Pulley's  3'     Supine wand press up into flexion (full ROM) 3# 1 x15    Supine wand ER  1 x15 Heavy tactile cues   Supine DB press up 5# 2 x15    Supine DB flexion (full ROM) 2# 2 x15    SL

## 2023-05-04 ENCOUNTER — HOSPITAL ENCOUNTER (OUTPATIENT)
Dept: PHYSICAL THERAPY | Age: 68
Setting detail: THERAPIES SERIES
Discharge: HOME | End: 2023-05-04
Payer: COMMERCIAL

## 2023-05-04 PROCEDURE — 97140 MANUAL THERAPY 1/> REGIONS: CPT

## 2023-05-04 NOTE — FLOWSHEET NOTE
[facility]    Physical Therapy Treatment Note/ Progress Report:     Date:  2023    Patient Name:  Kinga Ortiz    :  1955  MRN: [id number]  Medical Diagnosis:  Adhesive capsulitis of left shoulder [M75.02]  Treatment Diagnosis:    ICD-10-CM    1. Chronic left shoulder pain  M25.512     G89.29       2. Shoulder impingement syndrome, left  M75.42       3. Bad posture  R29.3         Insurance/Certification information:  PT Insurance Information: [facility] CHOICE PLUS / NO DED / $30 COPAY / Cherylle Pila  Physician Information:  JOSE ANTONIO Buck   Plan of care signed (Y/N): []  Yes [x]  No  Date sent: 23    Date of Patient follow up with Physician:      Progress Report: []  Yes [x]  No     Functional Scale:    Date assessed:  SPADI 87/130    23    Date Range for reporting period:  Beginnin23  Ending:      Progress report due (10 Rx/or 30 days whichever is less): 55     Recertification due (POC duration/ or 90 days whichever is less): 23     Visit # Insurance Allowable Auth required? Date Range   7 BOMN []  Yes[x]  No      Pain level:  0/10, soreness     SUBJECTIVE:  Patient arrives 20 minute late to session, stating he was Danae today and lost track of time. \" Patient reports \"feeling good\" today. Pain has improved in R shoulder since last session and has felt significant improvements in mobility and strength overall in L shoulder. OBJECTIVE: Skilled care provided in flow sheet below; Observation:   23: Patient unable to obtain full erected posturing secondary to lumbothoracic hypomobility and chronic degenerative changes.   Test measurements:      RESTRICTIONS/PRECAUTIONS: Left shoulder pain, History of RTC repair +10 years ago    Exercises/Interventions:   Therapeutic Ex  28' Wt / Resistance Sets/sec Reps Notes   Pulley's  3'     Supine wand press up into flexion (full ROM) 3# 1 x15    Supine wand ER

## 2023-05-10 ENCOUNTER — HOSPITAL ENCOUNTER (OUTPATIENT)
Dept: PHYSICAL THERAPY | Age: 68
Setting detail: THERAPIES SERIES
Discharge: HOME | End: 2023-05-10
Payer: COMMERCIAL

## 2023-05-10 PROCEDURE — 97140 MANUAL THERAPY 1/> REGIONS: CPT

## 2023-05-10 PROCEDURE — 97110 THERAPEUTIC EXERCISES: CPT

## 2023-05-10 NOTE — FLOWSHEET NOTE
ROM) 2# 2 x15    SL abduction 1# 2 x15 Verbal cues   SL ER 1# 2 x15 Verbal cues   Tband row Black 2 x15    Tband ext Red 2 x15 3 sec hold   Tband scaption NV      Tband Add Yellow  1 x15    Tband ER Red 2 x15    Education per below  8'     Standing wand OH flexion 2# 2 x10    KB cabinet stack 5# 2 x10                                          Therapeutic Activities                                                                      Manual Intervention  10'       Shld/GH Mobs  5'  AP and inferior job mobs in neutral, mid and end range   Post Cap mobs       Thoracic/Rib manipulation       CT MT/Mobs       PROM MT  10'  PROM into all movement planes (flex, ER, IR, abd)                 NMR re-education                                                                 Pt. Education:  4/14/23: Evaluation - Patient education regarding PT assessment and plan of care. Discussed possible courses of treatment which included but not limited to dry needling, stabilization exercises and manual/manipulation therapy. Discussed activity modification, while providing explanation in regards to anatomical structures involved, healing time frames and relevant joint mechanics. Provided patient time for questions with answers provided when possible. Home Exercise Program:  HEP instruction: Access Code: [email or web address]  URL: [email or web address]  Date: 04/14/2023  Prepared by:  Yana San Elizario     Exercises  - Supine Shoulder Flexion Extension AAROM with Dowel  - 2 x daily - 1 sets - 10 reps - 3 seconds hold  - Supine Shoulder External Rotation with Dowel  - 2 x daily - 1 sets - 10 reps - 3 seconds hold  - Sidelying Shoulder External Rotation with Dumbbell  - 2 x daily - 1 sets - 15 reps  - Sidelying Shoulder Abduction Palm Forward  - 2 x daily - 1 sets - 15 reps      Therapeutic Exercise and NMR EXR  [x] (73103) Provided verbal/tactile cueing for activities related to strengthening, flexibility, endurance, ROM  for improvements in

## 2023-05-17 ENCOUNTER — HOSPITAL ENCOUNTER (OUTPATIENT)
Dept: PHYSICAL THERAPY | Age: 68
Setting detail: THERAPIES SERIES
Discharge: HOME | End: 2023-05-17
Payer: COMMERCIAL

## 2023-05-17 NOTE — FLOWSHEET NOTE
[facility]    Physical Therapy  Cancellation/No-show Note  Patient Name:  Caridad Etienne  :  1955   Date:  2023  Cancelled visits to date: 0  No-shows to date: 1    For today's appointment patient:  []  Cancelled  []  Rescheduled appointment  [x]  No-show     Reason given by patient:  []  Patient ill  []  Conflicting appointment  []  No transportation    []  Conflict with work  [x]  No reason given  []  Other:     Comments:      Phone call information:   [x]  Phone call made today to patient at _ time at number provided:      []  Patient answered, conversation as follows:    [x]  Patient did not answer, message left as follows: instructed patient had missed his appointment and to call back about making further follow ups if necessary  []  Phone call not made today  []  Phone call not needed - pt contacted us to cancel and provided reason for cancellation. Electronically signed by:   Haylee Birmingham, PT, DPT

## 2023-05-18 ENCOUNTER — TELEPHONE (OUTPATIENT)
Dept: ORTHOPEDIC SURGERY | Age: 68
End: 2023-05-18

## 2023-05-22 ENCOUNTER — OFFICE VISIT (OUTPATIENT)
Dept: ORTHOPEDIC SURGERY | Age: 68
End: 2023-05-22
Payer: MEDICARE

## 2023-05-22 VITALS — BODY MASS INDEX: 35.4 KG/M2 | HEIGHT: 69 IN | RESPIRATION RATE: 16 BRPM | WEIGHT: 239 LBS

## 2023-05-22 DIAGNOSIS — M75.81 ROTATOR CUFF TENDINITIS, RIGHT: ICD-10-CM

## 2023-05-22 DIAGNOSIS — M25.511 RIGHT SHOULDER PAIN, UNSPECIFIED CHRONICITY: Primary | ICD-10-CM

## 2023-05-22 PROCEDURE — G8427 DOCREV CUR MEDS BY ELIG CLIN: HCPCS | Performed by: PHYSICIAN ASSISTANT

## 2023-05-22 PROCEDURE — G8417 CALC BMI ABV UP PARAM F/U: HCPCS | Performed by: PHYSICIAN ASSISTANT

## 2023-05-22 PROCEDURE — 1123F ACP DISCUSS/DSCN MKR DOCD: CPT | Performed by: PHYSICIAN ASSISTANT

## 2023-05-22 PROCEDURE — 3017F COLORECTAL CA SCREEN DOC REV: CPT | Performed by: PHYSICIAN ASSISTANT

## 2023-05-22 PROCEDURE — 1036F TOBACCO NON-USER: CPT | Performed by: PHYSICIAN ASSISTANT

## 2023-05-22 PROCEDURE — 99213 OFFICE O/P EST LOW 20 MIN: CPT | Performed by: PHYSICIAN ASSISTANT

## 2023-05-22 NOTE — PROGRESS NOTES
Subjective:      Patient ID: German Holt is a 69 y.o. right handed male self-referred  for evaluation and treatment of Right shoulder pain. This is evaluated as a personal injury. Onset of symptoms during therapy for left shoulder. .   These symptoms have been progressive in nature. There is not a history of traumatic injury. Pain is intermittent, moderate. Location of pain deltoid region right shoulder. Pain is on average 6/10 VAS. Pain is worse with use of the affected extremity. Pain improves with rest, avoidance of activity. There is not associated numbness/ tingling. There is not associated neck pain. Review of Systems:  Constitutional: denies fever, chills, weight loss. MSK: denies pain in other joints, muscle aches. Neurological: denies numbness, tingling, weakness.     Past Medical History:   Diagnosis Date    Arthritis     Chronic back pain     Diabetes mellitus ([facility] [address]     Fracture of nasal bone     Headache     Hearing loss     High blood pressure     Hyperlipidemia     Wears partial dentures        Family History   Problem Relation Age of Onset    Heart Disease Mother     High Blood Pressure Mother     Diabetes Mother     Heart Disease Father     High Blood Pressure Father     Diabetes Father        Past Surgical History:   Procedure Laterality Date    APPENDECTOMY      BACK SURGERY  10/2007   1979  1980    BRONCHOSCOPY      CARPAL TUNNEL RELEASE Bilateral     COLONOSCOPY      ESOPHAGUS SURGERY      HIP ARTHROPLASTY Left 04/04/2017    HIP SURGERY  04/2008    bursa removed    JOINT REPLACEMENT  2009, 2006, 2003, 1997    Left knee replacement    KNEE SURGERY Left     [id number] scopes    SHOULDER ARTHROSCOPY Left 7/26/2019    LEFT SHOULDER ARTHROSCOPY, SUBACROMIAL DECOMPRESSION, ROTATOR CUFF REPAIR performed by Brendan Vela MD at [address] [place] [address] History     Occupational History    Not on file   Tobacco Use    Smoking status: Never    Smokeless tobacco:

## 2023-05-24 ENCOUNTER — HOSPITAL ENCOUNTER (OUTPATIENT)
Dept: PHYSICAL THERAPY | Age: 68
Setting detail: THERAPIES SERIES
Discharge: HOME | End: 2023-05-24
Payer: COMMERCIAL

## 2023-05-24 PROCEDURE — 97110 THERAPEUTIC EXERCISES: CPT

## 2023-05-24 PROCEDURE — 97140 MANUAL THERAPY 1/> REGIONS: CPT

## 2023-05-24 NOTE — FLOWSHEET NOTE
[facility]    Physical Therapy Treatment Note/ Progress Report:     Date:  2023    Patient Name:  Carole Parmar    :  1955  MRN: [id number]  Medical Diagnosis:  Adhesive capsulitis of left shoulder [M75.02]  Treatment Diagnosis:    ICD-10-CM    1. Chronic left shoulder pain  M25.512     G89.29       2. Shoulder impingement syndrome, left  M75.42       3. Bad posture  R29.3         Insurance/Certification information:  PT Insurance Information: [facility] CHOICE PLUS / NO DED / $30 COPAY / [place] [employer]  Physician Information:  JOSE ANTONIO Warren   Plan of care signed (Y/N): []  Yes [x]  No  Date sent: 23    Date of Patient follow up with Physician:      Progress Report: []  Yes [x]  No     Functional Scale:    Date assessed:  SPADI 87/130    23  SPADI 68/130    23    Date Range for reporting period:  Beginnin23  Ending:      Progress report due (10 Rx/or 30 days whichever is less): 85     Recertification due (POC duration/ or 90 days whichever is less): 23     Visit # Insurance Allowable Auth required? Date Range   9 BOMN []  Yes [x]  No      Pain level: 0/10, soreness     SUBJECTIVE: Patient presents to therapy with improvements in L shoulder pain. States he met with MD earlier this week and got an injection in R shoulder, as pain was becoming more uncomfortable. Feels like motion and strength has improved in L shoulder and has increased activity with L arm. OBJECTIVE: Skilled care provided in flow sheet below; Observation:   23: Patient unable to obtain full erected posturing secondary to lumbothoracic hypomobility and chronic degenerative changes.   Test measurements:   23   ROM Left Right   PROM Flex 170 150   PROM Abd 165    PROM ER 75 90   PROM IR 50 60     RESTRICTIONS/PRECAUTIONS: Left shoulder pain, History of RTC repair +10 years ago    Exercises/Interventions:   Therapeutic

## 2023-07-05 ENCOUNTER — OFFICE VISIT (OUTPATIENT)
Dept: ORTHOPEDIC SURGERY | Age: 68
End: 2023-07-05

## 2023-07-05 VITALS — HEIGHT: 69 IN | BODY MASS INDEX: 35.55 KG/M2 | WEIGHT: 240 LBS

## 2023-07-05 DIAGNOSIS — M70.62 TROCHANTERIC BURSITIS OF LEFT HIP: ICD-10-CM

## 2023-07-05 DIAGNOSIS — M25.562 ACUTE PAIN OF LEFT KNEE: Primary | ICD-10-CM

## 2023-07-05 RX ORDER — BUPIVACAINE HYDROCHLORIDE 2.5 MG/ML
2 INJECTION, SOLUTION INFILTRATION; PERINEURAL ONCE
Status: COMPLETED | OUTPATIENT
Start: 2023-07-05 | End: 2023-07-05

## 2023-07-05 RX ORDER — TRIAMCINOLONE ACETONIDE 40 MG/ML
40 INJECTION, SUSPENSION INTRA-ARTICULAR; INTRAMUSCULAR ONCE
Status: COMPLETED | OUTPATIENT
Start: 2023-07-05 | End: 2023-07-05

## 2023-07-05 RX ORDER — AMOXICILLIN 500 MG/1
CAPSULE ORAL
COMMUNITY
Start: 2023-04-10

## 2023-07-05 RX ORDER — LISINOPRIL AND HYDROCHLOROTHIAZIDE 25; 20 MG/1; MG/1
TABLET ORAL
COMMUNITY
Start: 2023-04-06

## 2023-07-05 RX ORDER — NALOXONE HYDROCHLORIDE 4 MG/.1ML
SPRAY NASAL
COMMUNITY
Start: 2023-04-25

## 2023-07-05 RX ADMIN — BUPIVACAINE HYDROCHLORIDE 5 MG: 2.5 INJECTION, SOLUTION INFILTRATION; PERINEURAL at 15:30

## 2023-07-05 RX ADMIN — TRIAMCINOLONE ACETONIDE 40 MG: 40 INJECTION, SUSPENSION INTRA-ARTICULAR; INTRAMUSCULAR at 15:30

## 2023-07-05 NOTE — PROGRESS NOTES
include completion of the medical record. This time excludes any time spent performing procedures or tests in the office. Richy Lopez PA-C   Senior Physician Assistant   [facility]                                         Disclaimer: This note was generated with use of a verbal recognition program (DRAGON) and an attempt was made to check for errors. It is possible that there are still dictated errors within this office note. If so, please bring any significant errors to my attention for an addendum. All efforts were made to ensure that this office note is accurate.

## 2023-07-12 NOTE — FLOWSHEET NOTE
Strength- 4-/5    Exercises:  Exercise/Equipment Resistance/Repetitions Other comments   Nu step 6 min    Leg press 60 # x 30 , 30# x 30 right    Heel slides 30 x     Hams stretch 3 x 30 sec     Seated curls 2k x 30    saq/laq 2 # x 30     wabble X 2 min ea    Step ups     Side step     Standing tke X 20    incline 3 x 30 sec          HEP     Heel slides X 20    Qs,laq X 10     slr X 10    Seated hams/gastroc stretch 30 x 3    Seated flex stretch 30 x 3    Weight shift X 2 min           Other Therapeutic Activities:      Home Exercise Program:  Patient demonstrated proper technique, good tolerance,  and was given written instructions for the above exercises      Manual Treatments:  Prom flex and ext x 10 min    Modalities:IFC  with cp x 15    Timed Code Treatment Minutes: 42      Total Treatment Minutes:  60    Treatment/Activity Tolerance:  [x] Patient tolerated treatment well [] Patient limited by fatigue  [] Patient limited by pain  [] Patient limited by other medical complications  [] Other:     Prognosis: [x] Good [] Fair  [] Poor    Patient Requires Follow-up: [x] Yes  [] No    Plan:   [] Continue per plan of care [] Alter current plan (see comments)  medicare  [x] Plan of care initiated [] Hold pending MD visit [] Discharge  Plan for Next Session: right le rom , strength, gait, hep, functional activities, mfr, rom, mods for pain, advanced gait activities, proprio, 5/22/18  PT had a little redness in lower leg, it was not warm to otuch.   Told him to keep an eye on it and call the md if it changes     Electronically signed by:  Susy Cano, PT Minoxidil Pregnancy And Lactation Text: This medication has not been assigned a Pregnancy Risk Category but animal studies failed to show danger with the topical medication. It is unknown if the medication is excreted in breast milk.

## 2023-08-31 ENCOUNTER — OFFICE VISIT (OUTPATIENT)
Dept: CARDIOLOGY CLINIC | Age: 68
End: 2023-08-31
Payer: COMMERCIAL

## 2023-08-31 VITALS
SYSTOLIC BLOOD PRESSURE: 132 MMHG | BODY MASS INDEX: 34.96 KG/M2 | WEIGHT: 236 LBS | HEIGHT: 69 IN | OXYGEN SATURATION: 91 % | HEART RATE: 89 BPM | DIASTOLIC BLOOD PRESSURE: 86 MMHG

## 2023-08-31 DIAGNOSIS — I48.0 PAF (PAROXYSMAL ATRIAL FIBRILLATION) (HCC): Primary | ICD-10-CM

## 2023-08-31 PROCEDURE — 3017F COLORECTAL CA SCREEN DOC REV: CPT | Performed by: INTERNAL MEDICINE

## 2023-08-31 PROCEDURE — 99215 OFFICE O/P EST HI 40 MIN: CPT | Performed by: INTERNAL MEDICINE

## 2023-08-31 PROCEDURE — G8417 CALC BMI ABV UP PARAM F/U: HCPCS | Performed by: INTERNAL MEDICINE

## 2023-08-31 PROCEDURE — 3079F DIAST BP 80-89 MM HG: CPT | Performed by: INTERNAL MEDICINE

## 2023-08-31 PROCEDURE — 93000 ELECTROCARDIOGRAM COMPLETE: CPT | Performed by: INTERNAL MEDICINE

## 2023-08-31 PROCEDURE — 1036F TOBACCO NON-USER: CPT | Performed by: INTERNAL MEDICINE

## 2023-08-31 PROCEDURE — G8427 DOCREV CUR MEDS BY ELIG CLIN: HCPCS | Performed by: INTERNAL MEDICINE

## 2023-08-31 PROCEDURE — 3075F SYST BP GE 130 - 139MM HG: CPT | Performed by: INTERNAL MEDICINE

## 2023-08-31 PROCEDURE — 1123F ACP DISCUSS/DSCN MKR DOCD: CPT | Performed by: INTERNAL MEDICINE

## 2023-08-31 NOTE — PROGRESS NOTES
[facility]  Cardiology Note      López Mayes  1955, 77 y.o.      CC: \" I'm pretty good. \"           Scar Asencio MD:      HPI:   This is a 77 y.o. male with a past medical history significant for hypertension, hyperlipidemia, and atrial fibrillation. Today, patient returns for follow up. He is accompanied by his wife. He states feeling chest pains on Tuesday. He was eating dinner, after a few bites he felt a pain in the center of his chest that he describes as a \"knot. \" He denies nausea or vomiting felt like he was going to get sick. He denies having any associated symptoms. Wife states within 15 minutes of arriving home, his oxygen saturation 87%, BP was 78/53 and pulse 92. After a few minutes, she re-checked vitals that had normalized. Symptoms subsided within 30 minutes of arriving home. Patient also states feeling bilateral leg weakness that has been ongoing for approximately 1 month.      Past Medical History:   Diagnosis Date    Arthritis     Chronic back pain     Diabetes mellitus ([address])     Fracture of nasal bone     Headache     Hearing loss     High blood pressure     Hyperlipidemia     Wears partial dentures       Past Surgical History:   Procedure Laterality Date    APPENDECTOMY      BACK SURGERY  10/2007   1979  1980    BRONCHOSCOPY      CARPAL TUNNEL RELEASE Bilateral     COLONOSCOPY      ESOPHAGUS SURGERY      HIP ARTHROPLASTY Left 04/04/2017    HIP SURGERY  04/2008    bursa removed    JOINT REPLACEMENT  2009, 2006, 2003, 1997    Left knee replacement    KNEE SURGERY Left     [id number] scopes    SHOULDER ARTHROSCOPY Left 7/26/2019    LEFT SHOULDER ARTHROSCOPY, SUBACROMIAL DECOMPRESSION, ROTATOR CUFF REPAIR performed by Margo Moran MD at [address]        Family History   Problem Relation Age of Onset    Heart Disease Mother     High Blood Pressure Mother     Diabetes Mother     Heart Disease Father     High Blood Pressure Father     Diabetes Father

## 2023-09-14 ENCOUNTER — TELEPHONE (OUTPATIENT)
Dept: CARDIOTHORACIC SURGERY | Age: 68
End: 2023-09-14

## 2023-09-14 NOTE — TELEPHONE ENCOUNTER
Discussed with pt. Since he lives in [facility], he would like to see Dr Anupama Allen at his [place] office. Pt will call when ready to schedule.   AURORA

## 2023-09-20 ENCOUNTER — TELEPHONE (OUTPATIENT)
Dept: VASCULAR SURGERY | Age: 68
End: 2023-09-20

## 2023-09-20 NOTE — TELEPHONE ENCOUNTER
Calling for follow-up information from his call earlier this week regarding an appointment. Please call him at [phone].

## 2023-09-20 NOTE — TELEPHONE ENCOUNTER
Signed                  Discussed with pt. Since he lives in [facility], he would like to see Dr Ivis Marley at his [place] office. Pt will call when ready to schedule. AURORA             Pt informed I will forward the message to Dr Jeri Souza team and request they call to schedule him for an appointment at the [place] office.

## 2023-09-22 NOTE — TELEPHONE ENCOUNTER
Dr. Brian Mendez does not do Venous Stasis in [address] [place] [address]. He only goes to [facility] and [facility] for that. If patient needs to [address] [place] [address] then he can see Dr. Kimberly Jimenez.

## 2023-09-27 ENCOUNTER — TELEPHONE (OUTPATIENT)
Dept: VASCULAR SURGERY | Age: 68
End: 2023-09-27

## 2023-09-27 NOTE — TELEPHONE ENCOUNTER
Patient called in wanting to schedule a new patient appt with Dr. Max Sandhu for:     Issues with veins in left leg     Patient was a previous patient of Dr. Elias Reason     Please contact the patient at [phone]

## 2023-10-02 PROBLEM — G89.29 CHRONIC PAIN OF LEFT UPPER EXTREMITY: Status: ACTIVE | Noted: 2023-10-02

## 2023-10-02 PROBLEM — R29.3 POOR POSTURE: Status: ACTIVE | Noted: 2023-10-02

## 2023-10-02 PROBLEM — M79.602 CHRONIC PAIN OF LEFT UPPER EXTREMITY: Status: ACTIVE | Noted: 2023-10-02

## 2023-10-02 PROBLEM — M47.812 CERVICAL SPONDYLOSIS WITHOUT MYELOPATHY: Status: ACTIVE | Noted: 2021-08-31

## 2023-10-02 PROBLEM — M79.18 MYOFASCIAL PAIN: Status: ACTIVE | Noted: 2019-06-16

## 2023-10-02 PROBLEM — M75.42 IMPINGEMENT SYNDROME OF LEFT SHOULDER: Status: ACTIVE | Noted: 2023-04-03

## 2023-10-02 PROBLEM — M70.61 TROCHANTERIC BURSITIS OF RIGHT HIP: Status: ACTIVE | Noted: 2023-04-25

## 2023-10-02 PROBLEM — M53.3 SACROILIAC JOINT DYSFUNCTION: Status: ACTIVE | Noted: 2019-10-03

## 2023-10-02 PROBLEM — M47.816 SPONDYLOSIS OF LUMBAR REGION WITHOUT MYELOPATHY OR RADICULOPATHY: Status: ACTIVE | Noted: 2020-09-07

## 2023-12-07 ENCOUNTER — OFFICE VISIT (OUTPATIENT)
Dept: VASCULAR SURGERY | Age: 68
End: 2023-12-07
Payer: COMMERCIAL

## 2023-12-07 VITALS
SYSTOLIC BLOOD PRESSURE: 99 MMHG | BODY MASS INDEX: 35 KG/M2 | DIASTOLIC BLOOD PRESSURE: 64 MMHG | WEIGHT: 237 LBS | HEART RATE: 61 BPM

## 2023-12-07 DIAGNOSIS — I87.323: Primary | ICD-10-CM

## 2023-12-07 PROCEDURE — 3074F SYST BP LT 130 MM HG: CPT | Performed by: SURGERY

## 2023-12-07 PROCEDURE — 1036F TOBACCO NON-USER: CPT | Performed by: SURGERY

## 2023-12-07 PROCEDURE — G8417 CALC BMI ABV UP PARAM F/U: HCPCS | Performed by: SURGERY

## 2023-12-07 PROCEDURE — 1123F ACP DISCUSS/DSCN MKR DOCD: CPT | Performed by: SURGERY

## 2023-12-07 PROCEDURE — G8427 DOCREV CUR MEDS BY ELIG CLIN: HCPCS | Performed by: SURGERY

## 2023-12-07 PROCEDURE — 99204 OFFICE O/P NEW MOD 45 MIN: CPT | Performed by: SURGERY

## 2023-12-07 PROCEDURE — G8484 FLU IMMUNIZE NO ADMIN: HCPCS | Performed by: SURGERY

## 2023-12-07 PROCEDURE — 3017F COLORECTAL CA SCREEN DOC REV: CPT | Performed by: SURGERY

## 2023-12-07 PROCEDURE — 3078F DIAST BP <80 MM HG: CPT | Performed by: SURGERY

## 2023-12-07 RX ORDER — DIAZEPAM 5 MG/1
TABLET ORAL
COMMUNITY
Start: 2023-11-17

## 2023-12-07 RX ORDER — AMLODIPINE BESYLATE 2.5 MG/1
TABLET ORAL
COMMUNITY
Start: 2023-12-06

## 2023-12-07 RX ORDER — EMPAGLIFLOZIN AND METFORMIN HYDROCHLORIDE 5; 1000 MG/1; MG/1
TABLET ORAL
COMMUNITY

## 2023-12-07 NOTE — PROGRESS NOTES
daily (before meals) 6/18/23  Yes Prasanna Mustafa MD   LORazepam (ATIVAN) 0.5 MG tablet  10/3/22  Yes Prasanna Mustafa MD   torsemide (DEMADEX) 20 MG tablet Take 1 tablet by mouth daily as needed (shortness of breat and/or wheezing.) 9/23/22  Yes Tamiko Lofton MD   apixaban (ELIQUIS) 5 MG TABS tablet Take 1 tablet by mouth in the morning and 1 tablet before bedtime. 8/6/22  Yes Marisol Jhaveri MD   pregabalin (LYRICA) 75 MG capsule Take 1 capsule by mouth 3 times daily. 1/3/22  Yes Prasanna Mustafa MD   MULTIPLE VITAMIN PO Take by mouth   Yes Prasanna Mustafa MD   nabumetone (RELAFEN) 750 MG tablet Take 1 tablet by mouth 2 times daily 12/3/21  Yes Prasanna Mustafa MD   tiZANidine (ZANAFLEX) 4 MG tablet Take 1 tablet by mouth 3 times daily 5/29/20  Yes Prasanna Mustafa MD   oxyCODONE-acetaminophen (PERCOCET) 5-325 MG per tablet Take 1 tablet by mouth every 8 hours as needed for Pain. Yes Provider, MD Prasanna   amitriptyline (ELAVIL) 75 MG tablet Take 1 tablet by mouth nightly as needed 3/15/17  Yes Prasanna Mustafa MD   zolpidem (AMBIEN) 5 MG tablet Take 1 tablet by mouth nightly as needed. 3/12/13  Yes Prasanna Mustafa MD   atorvastatin (LIPITOR) 20 MG tablet Take 1 tablet by mouth nightly   Yes ProviderPrasanna MD   amLODIPine (NORVASC) 10 MG tablet Take 1 tablet by mouth daily  Patient not taking: Reported on 12/7/2023    Prasanna Mustafa MD   amLODIPine (NORVASC) 5 MG tablet Take 1 tablet by mouth daily  Patient not taking: Reported on 12/7/2023    Prasanna Mustafa MD   amoxicillin (AMOXIL) 500 MG capsule  4/10/23   Prasanna Mustafa MD   lisinopril-hydroCHLOROthiazide (PRINZIDE;ZESTORETIC) 20-25 MG per tablet  4/6/23   Prasanna Mustafa MD   naloxone 4 MG/0.1ML LIQD nasal spray  4/25/23   Prasanna Mustafa MD   flecainide (TAMBOCOR) 50 MG tablet Take 1 tablet by mouth in the morning and 1 tablet before bedtime.  8/6/22   Vivi

## 2023-12-14 PROBLEM — I87.323: Status: ACTIVE | Noted: 2023-12-14

## 2023-12-26 ENCOUNTER — TELEPHONE (OUTPATIENT)
Dept: VASCULAR SURGERY | Age: 68
End: 2023-12-26

## 2023-12-26 NOTE — TELEPHONE ENCOUNTER
The patient is scheduled to have a vascular study on 12-. The patient is having a bone marrow test the same morning and wants to ensure that it is safe to do both on the same day. Please call.

## 2023-12-26 NOTE — TELEPHONE ENCOUNTER
Spoke to patient and let him know that it would be okay to get vascular scan and bone marrow test on the same day.

## 2023-12-28 ENCOUNTER — PROCEDURE VISIT (OUTPATIENT)
Dept: VASCULAR SURGERY | Age: 68
End: 2023-12-28
Payer: COMMERCIAL

## 2023-12-28 DIAGNOSIS — I87.323: Primary | ICD-10-CM

## 2023-12-28 DIAGNOSIS — I87.323: ICD-10-CM

## 2023-12-28 PROCEDURE — 93970 EXTREMITY STUDY: CPT | Performed by: SURGERY

## 2024-02-13 ENCOUNTER — OFFICE VISIT (OUTPATIENT)
Dept: VASCULAR SURGERY | Age: 69
End: 2024-02-13
Payer: COMMERCIAL

## 2024-02-13 VITALS
DIASTOLIC BLOOD PRESSURE: 72 MMHG | OXYGEN SATURATION: 93 % | BODY MASS INDEX: 35.03 KG/M2 | RESPIRATION RATE: 16 BRPM | SYSTOLIC BLOOD PRESSURE: 122 MMHG | TEMPERATURE: 97.5 F | WEIGHT: 237.2 LBS | HEART RATE: 79 BPM

## 2024-02-13 DIAGNOSIS — I87.323: Primary | ICD-10-CM

## 2024-02-13 PROCEDURE — G8417 CALC BMI ABV UP PARAM F/U: HCPCS | Performed by: SURGERY

## 2024-02-13 PROCEDURE — 1036F TOBACCO NON-USER: CPT | Performed by: SURGERY

## 2024-02-13 PROCEDURE — 99214 OFFICE O/P EST MOD 30 MIN: CPT | Performed by: SURGERY

## 2024-02-13 PROCEDURE — 3074F SYST BP LT 130 MM HG: CPT | Performed by: SURGERY

## 2024-02-13 PROCEDURE — 1123F ACP DISCUSS/DSCN MKR DOCD: CPT | Performed by: SURGERY

## 2024-02-13 PROCEDURE — 3017F COLORECTAL CA SCREEN DOC REV: CPT | Performed by: SURGERY

## 2024-02-13 PROCEDURE — G8427 DOCREV CUR MEDS BY ELIG CLIN: HCPCS | Performed by: SURGERY

## 2024-02-13 PROCEDURE — G8484 FLU IMMUNIZE NO ADMIN: HCPCS | Performed by: SURGERY

## 2024-02-13 PROCEDURE — 3078F DIAST BP <80 MM HG: CPT | Performed by: SURGERY

## 2024-02-13 NOTE — PROGRESS NOTES
2024    Azam Blank (:  1955) is a 68 y.o. male,Established patient, here for evaluation of the following chief complaint(s):  Follow-up ( f/u venous stasis/)        ASSESSMENT/PLAN:  1. Idiopathic chronic venous hypertension of lower extremity with inflammation, bilateral  Recommend consistent compression for lifetime.  Weight management, elevation of legs.  No surgical intervention recommended at this time.  I will be happy to see him an as-needed basi.    No follow-ups on file.       SUBJECTIVE/OBJECTIVE:  Azam returns today in follow-up of venous stasis disease.  He has been wearing his compression fairly religiously.  Left mid calf vein still prominent but not as sore. Reports legs are getting \"weaker and weaker\" from lumbar radiculopathy.    I personally reviewed images of the following study:  VL Venous Reflux Insufficiency Bilateral       Physical Exam  Vitals:    24 1019   BP: 122/72   Site: Right Lower Arm   Position: Sitting   Cuff Size: Large Adult   Pulse: 79   Resp: 16   Temp: 97.5 °F (36.4 °C)   TempSrc: Infrared   SpO2: 93%   Weight: 107.6 kg (237 lb 3.2 oz)     No changes in exam since Dec evaluation-  General: AAO x 3. NAD. WD/WN, somewhat chronically ill appearing        Legs:       Comments: Bilateral medial malleolar and gaiter regions dark brown, indurated.  Chronic fibrotic changes to these areas.  Left medial calf with tender soft large varicosity.  No palpable cords.  No deformities.  Healed bilateral knee and right hip incisions.      On this date 2024 I have spent 30 minutes reviewing previous notes, test results and face to face with the patient discussing the diagnosis and importance of compliance with the treatment plan as well as documenting on the day of the visit.      An electronic signature was used to authenticate this note.    --Raegan Edmondson MD

## 2024-12-02 ENCOUNTER — OFFICE VISIT (OUTPATIENT)
Dept: ORTHOPEDIC SURGERY | Age: 69
End: 2024-12-02
Payer: MEDICARE

## 2024-12-02 VITALS — BODY MASS INDEX: 35.1 KG/M2 | HEIGHT: 69 IN | WEIGHT: 237 LBS

## 2024-12-02 DIAGNOSIS — M75.81 ROTATOR CUFF TENDINITIS, RIGHT: Primary | ICD-10-CM

## 2024-12-02 PROCEDURE — 20610 DRAIN/INJ JOINT/BURSA W/O US: CPT | Performed by: PHYSICIAN ASSISTANT

## 2024-12-02 PROCEDURE — 1159F MED LIST DOCD IN RCRD: CPT | Performed by: PHYSICIAN ASSISTANT

## 2024-12-02 PROCEDURE — G8427 DOCREV CUR MEDS BY ELIG CLIN: HCPCS | Performed by: PHYSICIAN ASSISTANT

## 2024-12-02 PROCEDURE — 99213 OFFICE O/P EST LOW 20 MIN: CPT | Performed by: PHYSICIAN ASSISTANT

## 2024-12-02 PROCEDURE — 1125F AMNT PAIN NOTED PAIN PRSNT: CPT | Performed by: PHYSICIAN ASSISTANT

## 2024-12-02 PROCEDURE — 3017F COLORECTAL CA SCREEN DOC REV: CPT | Performed by: PHYSICIAN ASSISTANT

## 2024-12-02 PROCEDURE — G8484 FLU IMMUNIZE NO ADMIN: HCPCS | Performed by: PHYSICIAN ASSISTANT

## 2024-12-02 PROCEDURE — 1123F ACP DISCUSS/DSCN MKR DOCD: CPT | Performed by: PHYSICIAN ASSISTANT

## 2024-12-02 PROCEDURE — G8417 CALC BMI ABV UP PARAM F/U: HCPCS | Performed by: PHYSICIAN ASSISTANT

## 2024-12-02 PROCEDURE — 1036F TOBACCO NON-USER: CPT | Performed by: PHYSICIAN ASSISTANT

## 2024-12-02 RX ORDER — TRIAMCINOLONE ACETONIDE 40 MG/ML
40 INJECTION, SUSPENSION INTRA-ARTICULAR; INTRAMUSCULAR ONCE
Status: COMPLETED | OUTPATIENT
Start: 2024-12-02 | End: 2024-12-02

## 2024-12-02 RX ORDER — BUPIVACAINE HYDROCHLORIDE 2.5 MG/ML
2 INJECTION, SOLUTION INFILTRATION; PERINEURAL ONCE
Status: COMPLETED | OUTPATIENT
Start: 2024-12-02 | End: 2024-12-02

## 2024-12-02 RX ADMIN — TRIAMCINOLONE ACETONIDE 40 MG: 40 INJECTION, SUSPENSION INTRA-ARTICULAR; INTRAMUSCULAR at 12:42

## 2024-12-02 RX ADMIN — BUPIVACAINE HYDROCHLORIDE 5 MG: 2.5 INJECTION, SOLUTION INFILTRATION; PERINEURAL at 12:34

## 2024-12-02 NOTE — PROGRESS NOTES
ordering of medications, tests or procedures was 21 minutes.  This time does include completion of the medical record.  This time excludes any time spent performing procedures or tests in the office.                                                  Adria Calderón PA-C   Senior Physician Assistant   [facility]                                         Disclaimer:  This note was generated with use of a verbal recognition program (DRAGON) and an attempt was made to check for errors.  It is possible that there are still dictated errors within this office note.  If so, please bring any significant errors to my attention for an addendum.  All efforts were made to ensure that this office note is accurate.

## (undated) DEVICE — TUBING, SUCTION, 1/4" X 12', STRAIGHT: Brand: MEDLINE

## (undated) DEVICE — 3M™ IOBAN™ 2 ANTIMICROBIAL INCISE DRAPE 6650EZ: Brand: IOBAN™ 2

## (undated) DEVICE — STRIP,CLOSURE,WOUND,MEDI-STRIP,1/2X4: Brand: MEDLINE

## (undated) DEVICE — KIT OR ROOM TURNOVER W/STRAP

## (undated) DEVICE — 1010 S-DRAPE TOWEL DRAPE 10/BX: Brand: STERI-DRAPE™

## (undated) DEVICE — SUTURE FIBERTAPE SZ 2-0 L36IN NONABSORBABLE BLU 2MM EA END AR7237

## (undated) DEVICE — 3M™ TEGADERM™ TRANSPARENT FILM DRESSING FRAME STYLE, 1626W, 4 IN X 4-3/4 IN (10 CM X 12 CM), 50/CT 4CT/CASE: Brand: 3M™ TEGADERM™

## (undated) DEVICE — 3M™ STERI-DRAPE™ U-DRAPE 1015: Brand: STERI-DRAPE™

## (undated) DEVICE — MAJOR SET UP PK

## (undated) DEVICE — NEEDLE SUT FOR EXPRESSEW LL AND LLL SUT PASS EXPRESSEW LLL

## (undated) DEVICE — DRAPE,U/SHT,SPLIT,FILM,60X84,STERILE: Brand: MEDLINE

## (undated) DEVICE — INTENDED FOR TISSUE SEPARATION, AND OTHER PROCEDURES THAT REQUIRE A SHARP SURGICAL BLADE TO PUNCTURE OR CUT.: Brand: BARD-PARKER ® STAINLESS STEEL BLADES

## (undated) DEVICE — 3M™ COBAN™ NL STERILE NON-LATEX SELF-ADHERENT WRAP, 2084S, 4 IN X 5 YD (10 CM X 4,5 M), 18 ROLLS/CASE: Brand: 3M™ COBAN™

## (undated) DEVICE — GARMENT COMPR L FOR 23IN CALF FLOTRN

## (undated) DEVICE — SHOULDER CANNULA SET WITHOUT FENESTRATIONS, 5.5 MM (I.D.) X 70 MM: Brand: CONMED

## (undated) DEVICE — GLOVE ORANGE PI 7 1/2   MSG9075

## (undated) DEVICE — SUTURE PROL SZ 3-0 L18IN NONABSORBABLE BLU L30MM FS-1 3/8 8663G

## (undated) DEVICE — CHLORAPREP 26ML ORANGE

## (undated) DEVICE — PACK,SHOULDER,DRAPE,POUCH: Brand: MEDLINE

## (undated) DEVICE — NEEDLE SPNL L3.5IN PNK HUB S STL REG WALL FIT STYL W/ QNCKE

## (undated) DEVICE — [STANDARD 12-FLUTE BARREL BUR, ARTHROSCOPIC SHAVER BLADE,  DO NOT RESTERILIZE,  DO NOT USE IF PACKAGE IS DAMAGED,  KEEP DRY,  KEEP AWAY FROM SUNLIGHT]: Brand: FORMULA

## (undated) DEVICE — SPONGE GZ W4XL4IN COT 12 PLY TYP VII WVN C FLD DSGN

## (undated) DEVICE — [AGGRESSIVE PLUS CUTTER, ARTHROSCOPIC SHAVER BLADE,  DO NOT RESTERILIZE,  DO NOT USE IF PACKAGE IS DAMAGED,  KEEP DRY,  KEEP AWAY FROM SUNLIGHT]: Brand: FORMULA

## (undated) DEVICE — PAD DRY FLOOR ABS 32X58IN GRN

## (undated) DEVICE — CANNULA ARTHSCP L7CM ID8.25MM TRNSLUC THRD FLX W/ NO SQUIRT

## (undated) DEVICE — ELECTRODE ES 90DEG SUCT INTEGR HNDPC DISP COOLPULSE 90 VAPR

## (undated) DEVICE — ELECTRODE PT RET AD L9FT HI MOIST COND ADH HYDRGEL CORDED

## (undated) DEVICE — Z INACTIVE USE 2660664 SOLUTION IRRIG 3000ML 0.9% SOD CHL USP UROMATIC PLAS CONT

## (undated) DEVICE — GLOVE ORANGE PI 8   MSG9080

## (undated) DEVICE — GOWN SIRUS NONREIN XL W/TWL: Brand: MEDLINE INDUSTRIES, INC.

## (undated) DEVICE — SHEET,DRAPE,53X77,STERILE: Brand: MEDLINE

## (undated) DEVICE — IMMOBILIZER SHLDR SWTH UNIV DEV BLK P.A.D. III

## (undated) DEVICE — 4-PORT MANIFOLD: Brand: NEPTUNE 2

## (undated) DEVICE — [ARTHROSCOPY PUMP,  DO NOT USE IF PACKAGE IS DAMAGED,  KEEP DRY,  KEEP AWAY FROM SUNLIGHT,  PROTECT FROM HEAT AND RADIOACTIVE SOURCES.]: Brand: FLOSTEADY

## (undated) DEVICE — SUTURE PROL SZ 0 L30IN NONABSORBABLE BLU MO-6 L26MM 1/2 CIR 8418H